# Patient Record
Sex: FEMALE | Race: ASIAN | NOT HISPANIC OR LATINO
[De-identification: names, ages, dates, MRNs, and addresses within clinical notes are randomized per-mention and may not be internally consistent; named-entity substitution may affect disease eponyms.]

---

## 2017-05-22 ENCOUNTER — RESULT REVIEW (OUTPATIENT)
Age: 37
End: 2017-05-22

## 2018-09-27 ENCOUNTER — EMERGENCY (EMERGENCY)
Facility: HOSPITAL | Age: 38
LOS: 1 days | Discharge: HOME | End: 2018-09-27
Attending: EMERGENCY MEDICINE | Admitting: EMERGENCY MEDICINE
Payer: MEDICAID

## 2018-09-27 VITALS
SYSTOLIC BLOOD PRESSURE: 145 MMHG | RESPIRATION RATE: 18 BRPM | HEART RATE: 68 BPM | DIASTOLIC BLOOD PRESSURE: 62 MMHG | TEMPERATURE: 98 F | OXYGEN SATURATION: 98 %

## 2018-09-27 VITALS
HEART RATE: 83 BPM | RESPIRATION RATE: 15 BRPM | TEMPERATURE: 98 F | SYSTOLIC BLOOD PRESSURE: 119 MMHG | DIASTOLIC BLOOD PRESSURE: 82 MMHG | OXYGEN SATURATION: 100 %

## 2018-09-27 LAB
APPEARANCE UR: CLEAR — SIGNIFICANT CHANGE UP
BASOPHILS # BLD AUTO: 0.04 K/UL — SIGNIFICANT CHANGE UP (ref 0–0.2)
BASOPHILS NFR BLD AUTO: 0.4 % — SIGNIFICANT CHANGE UP (ref 0–2)
BILIRUB UR-MCNC: NEGATIVE — SIGNIFICANT CHANGE UP
BLOOD UR QL VISUAL: NEGATIVE — SIGNIFICANT CHANGE UP
BUN SERPL-MCNC: 7 MG/DL — SIGNIFICANT CHANGE UP (ref 7–23)
CALCIUM SERPL-MCNC: 9.2 MG/DL — SIGNIFICANT CHANGE UP (ref 8.4–10.5)
CHLORIDE SERPL-SCNC: 102 MMOL/L — SIGNIFICANT CHANGE UP (ref 98–107)
CO2 SERPL-SCNC: 21 MMOL/L — LOW (ref 22–31)
COLOR SPEC: COLORLESS — SIGNIFICANT CHANGE UP
CREAT SERPL-MCNC: 0.67 MG/DL — SIGNIFICANT CHANGE UP (ref 0.5–1.3)
EOSINOPHIL # BLD AUTO: 0.67 K/UL — HIGH (ref 0–0.5)
EOSINOPHIL NFR BLD AUTO: 6.5 % — HIGH (ref 0–6)
GLUCOSE SERPL-MCNC: 111 MG/DL — HIGH (ref 70–99)
GLUCOSE UR-MCNC: NEGATIVE — SIGNIFICANT CHANGE UP
HCG UR-SCNC: NEGATIVE — SIGNIFICANT CHANGE UP
HCT VFR BLD CALC: 36.8 % — SIGNIFICANT CHANGE UP (ref 34.5–45)
HGB BLD-MCNC: 12.2 G/DL — SIGNIFICANT CHANGE UP (ref 11.5–15.5)
IMM GRANULOCYTES # BLD AUTO: 0.02 # — SIGNIFICANT CHANGE UP
IMM GRANULOCYTES NFR BLD AUTO: 0.2 % — SIGNIFICANT CHANGE UP (ref 0–1.5)
KETONES UR-MCNC: NEGATIVE — SIGNIFICANT CHANGE UP
LEUKOCYTE ESTERASE UR-ACNC: NEGATIVE — SIGNIFICANT CHANGE UP
LYMPHOCYTES # BLD AUTO: 2.2 K/UL — SIGNIFICANT CHANGE UP (ref 1–3.3)
LYMPHOCYTES # BLD AUTO: 21.5 % — SIGNIFICANT CHANGE UP (ref 13–44)
MCHC RBC-ENTMCNC: 27.1 PG — SIGNIFICANT CHANGE UP (ref 27–34)
MCHC RBC-ENTMCNC: 33.2 % — SIGNIFICANT CHANGE UP (ref 32–36)
MCV RBC AUTO: 81.6 FL — SIGNIFICANT CHANGE UP (ref 80–100)
MONOCYTES # BLD AUTO: 0.56 K/UL — SIGNIFICANT CHANGE UP (ref 0–0.9)
MONOCYTES NFR BLD AUTO: 5.5 % — SIGNIFICANT CHANGE UP (ref 2–14)
NEUTROPHILS # BLD AUTO: 6.76 K/UL — SIGNIFICANT CHANGE UP (ref 1.8–7.4)
NEUTROPHILS NFR BLD AUTO: 65.9 % — SIGNIFICANT CHANGE UP (ref 43–77)
NITRITE UR-MCNC: NEGATIVE — SIGNIFICANT CHANGE UP
NRBC # FLD: 0 — SIGNIFICANT CHANGE UP
PH UR: 6.5 — SIGNIFICANT CHANGE UP (ref 5–8)
PLATELET # BLD AUTO: 286 K/UL — SIGNIFICANT CHANGE UP (ref 150–400)
PMV BLD: 10.5 FL — SIGNIFICANT CHANGE UP (ref 7–13)
POTASSIUM SERPL-MCNC: 4.2 MMOL/L — SIGNIFICANT CHANGE UP (ref 3.5–5.3)
POTASSIUM SERPL-SCNC: 4.2 MMOL/L — SIGNIFICANT CHANGE UP (ref 3.5–5.3)
PROT UR-MCNC: NEGATIVE — SIGNIFICANT CHANGE UP
RBC # BLD: 4.51 M/UL — SIGNIFICANT CHANGE UP (ref 3.8–5.2)
RBC # FLD: 13.4 % — SIGNIFICANT CHANGE UP (ref 10.3–14.5)
SODIUM SERPL-SCNC: 138 MMOL/L — SIGNIFICANT CHANGE UP (ref 135–145)
SP GR SPEC: 1.01 — SIGNIFICANT CHANGE UP (ref 1–1.04)
UROBILINOGEN FLD QL: NORMAL — SIGNIFICANT CHANGE UP
WBC # BLD: 10.25 K/UL — SIGNIFICANT CHANGE UP (ref 3.8–10.5)
WBC # FLD AUTO: 10.25 K/UL — SIGNIFICANT CHANGE UP (ref 3.8–10.5)

## 2018-09-27 RX ORDER — DIAZEPAM 5 MG
5 TABLET ORAL ONCE
Qty: 0 | Refills: 0 | Status: COMPLETED | OUTPATIENT
Start: 2018-09-27 | End: 2018-09-27

## 2018-09-27 RX ORDER — KETOROLAC TROMETHAMINE 30 MG/ML
30 SYRINGE (ML) INJECTION ONCE
Qty: 0 | Refills: 0 | Status: DISCONTINUED | OUTPATIENT
Start: 2018-09-27 | End: 2018-09-27

## 2018-09-27 RX ORDER — ACETAMINOPHEN 500 MG
975 TABLET ORAL ONCE
Qty: 0 | Refills: 0 | Status: COMPLETED | OUTPATIENT
Start: 2018-09-27 | End: 2018-09-27

## 2018-09-27 RX ADMIN — Medication 30 MILLIGRAM(S): at 09:20

## 2018-09-27 RX ADMIN — Medication 975 MILLIGRAM(S): at 06:58

## 2018-09-27 RX ADMIN — Medication 975 MILLIGRAM(S): at 07:50

## 2018-09-27 NOTE — ED PROVIDER NOTE - OBJECTIVE STATEMENT
38F hx lupus p/w non-focal mid-to-lower back pain for 1 day. Describes pain throughout the bilateral paraspinal mid and lower back, no midline pain. Worse when lying down and w/ movement. Associated w/ R shoulder pain, R foot pain, and bilateral lower leg and foot numbness, all of which is chronic for months-years and unchanged from baseline. No new weakness/numbness/fever/bowel or bladder incontinence/dysuria/frequency/hematuria. Does describe "bubbling" in her urine last week.

## 2018-09-27 NOTE — ED PROVIDER NOTE - NSFOLLOWUPINSTRUCTIONS_ED_ALL_ED_FT
Please follow up with your primary care doctor  Motrin and/or tylenol for pain per dosing on bottle  Return to ED if you have any new or concerning symptoms

## 2018-09-27 NOTE — ED ADULT TRIAGE NOTE - CHIEF COMPLAINT QUOTE
Pt. with history of lupus if c/o back pain since last night, has been applying medicated creams with no relief. Pt. awake and alert, able to ambulate. Pt. with history of lupus if c/o back pain since last night, has been applying medicated creams with no relief. Pt. awake and alert, able to ambulate.    Addendum: Pt received from [facility]'s ED at this time. Pt states has chronic R sided joint point, states yesterday developed new lower back pain. Pt denies urinary symptoms or trauma.

## 2018-09-27 NOTE — ED PROVIDER NOTE - PROGRESS NOTE DETAILS
Received sign out from day team. Labs all wnl. Patient still endorsing significant pain, unchanged from prior. Will administer valium, toradol, re-eval.  Greg Brandon MD, PGY2 Emergency Medicine Received sign out from day team. Labs all wnl. Patient still endorsing significant pain, unchanged from prior. Will administer toradol, re-eval.  Greg Brandon MD, PGY2 Emergency Medicine Patient states she would like to go, as she has a daughter in the Peds ED. Will d/c home with strict return precautions, PCP f/u  Spoke with patient extensively regarding current differential diagnosis for ongoing symptoms, and patient acknowledged understanding. All questions and concerns have been addressed with the patient. I have discussed the plan for care and patient is in agreement. Patient is instructed to follow up with Primary Care Provider, and has been given strict return precautions.   Greg Brandon MD, PGY2 Emergency Medicine

## 2018-09-27 NOTE — ED PROVIDER NOTE - MEDICAL DECISION MAKING DETAILS
Jonathan Weil, PGY2 - 38F w/ paraspinal back pain, chronic RUE and RLE pain and BLE paresthesias, no new neuro deficits or red flags such as fever or recent steroid use. Will check UA/u-preg/renal function, provide analgesia.

## 2018-09-27 NOTE — ED PROVIDER NOTE - ATTENDING CONTRIBUTION TO CARE
DR. YUN, ATTENDING MD-  I performed a face to face bedside interview with patient regarding history of present illness, review of symptoms and past medical history. I completed an independent physical exam.  I have discussed patient's plan of care with the resident.   Documentation as above in the note.   HPI: 38F hx lupus p/w non-focal mid-to-lower back pain for 1 day. Describes pain throughout the bilateral paraspinal mid and lower back, no midline pain. Worse when lying down and w/ movement. Associated w/ R shoulder pain, R foot pain, and bilateral lower leg and foot numbness, all of which is chronic for months-years and unchanged from baseline. No new weakness/numbness/fever/bowel or bladder incontinence/dysuria/frequency/hematuria. Does describe "bubbling" in her urine last week.  EXAM: tenderness to palpation bilateral paraspinal thoracic pain. NAD, no C/T/L spine tenderness to palpation or stepoffs, abd soft nontender, lungs ctab, heart RRR.   MDM: concern for kidney dysfxn vs pyelo vs UTI vs MSK pain. benign exam. Will obtain labs,. provide pain meds and reassess.

## 2018-09-27 NOTE — ED ADULT NURSE NOTE - OBJECTIVE STATEMENT
Received pt to room 16 AOX3 in no apparent distress c/o lower back and coccyx pain x1 day. Pt also c/o increase in urinary frequency and urine appearing frothy. Pt denies any numbness, pain radiation, painful/burning urination, changes in bowl movement, N/V, chest pain, or any other symptoms at this time. Breaths are even unlabored and clear bilaterally, no obvious deformities to spine pt denies any trauma, hx include lupus pt states this is not typical flare up. IV placed, labs sent, will continue to monitor

## 2018-09-27 NOTE — ED ADULT NURSE NOTE - NSIMPLEMENTINTERV_GEN_ALL_ED
Implemented All Universal Safety Interventions:  [place] to call system. Call bell, personal items and telephone within reach. Instruct patient to call for assistance. Room bathroom lighting operational. Non-slip footwear when patient is off stretcher. Physically safe environment: no spills, clutter or unnecessary equipment. Stretcher in lowest position, wheels locked, appropriate side rails in place.

## 2018-09-27 NOTE — ED PROVIDER NOTE - NEUROLOGICAL, MLM
Alert and oriented, no focal deficits, no motor deficits, decreased sensation to light touch in BLE from feet to lower legs (chronic as described by pt)

## 2018-09-27 NOTE — ED ADULT NURSE NOTE - CHIEF COMPLAINT QUOTE
Pt. with history of lupus if c/o back pain since last night, has been applying medicated creams with no relief. Pt. awake and alert, able to ambulate.    Addendum: Pt received from [facility]'s ED at this time. Pt states has chronic R sided joint point, states yesterday developed new lower back pain. Pt denies urinary symptoms or trauma.

## 2018-10-05 ENCOUNTER — INPATIENT (INPATIENT)
Facility: HOSPITAL | Age: 38
LOS: 3 days | Discharge: HOME | DRG: 684 | End: 2018-10-09
Attending: HOSPITALIST | Admitting: HOSPITALIST
Payer: MEDICAID

## 2018-10-05 VITALS
RESPIRATION RATE: 18 BRPM | TEMPERATURE: 99 F | HEIGHT: 63 IN | SYSTOLIC BLOOD PRESSURE: 141 MMHG | HEART RATE: 87 BPM | DIASTOLIC BLOOD PRESSURE: 82 MMHG | WEIGHT: 130.07 LBS | OXYGEN SATURATION: 100 %

## 2018-10-05 DIAGNOSIS — N17.9 ACUTE KIDNEY FAILURE, UNSPECIFIED: ICD-10-CM

## 2018-10-05 PROBLEM — M32.9 SYSTEMIC LUPUS ERYTHEMATOSUS, UNSPECIFIED: Chronic | Status: ACTIVE | Noted: 2018-09-27

## 2018-10-05 LAB
ALBUMIN SERPL ELPH-MCNC: 3.8 G/DL — SIGNIFICANT CHANGE UP (ref 3.3–5)
ALP SERPL-CCNC: 81 U/L — SIGNIFICANT CHANGE UP (ref 40–120)
ALT FLD-CCNC: 43 U/L — SIGNIFICANT CHANGE UP (ref 10–45)
ANION GAP SERPL CALC-SCNC: 10 MMOL/L — SIGNIFICANT CHANGE UP (ref 5–17)
APTT BLD: 28.1 SEC — SIGNIFICANT CHANGE UP (ref 27.5–37.4)
AST SERPL-CCNC: 16 U/L — SIGNIFICANT CHANGE UP (ref 10–40)
BASOPHILS # BLD AUTO: 0 K/UL — SIGNIFICANT CHANGE UP (ref 0–0.2)
BASOPHILS NFR BLD AUTO: 0.5 % — SIGNIFICANT CHANGE UP (ref 0–2)
BILIRUB SERPL-MCNC: 0.1 MG/DL — LOW (ref 0.2–1.2)
BUN SERPL-MCNC: 13 MG/DL — SIGNIFICANT CHANGE UP (ref 7–23)
CALCIUM SERPL-MCNC: 8.9 MG/DL — SIGNIFICANT CHANGE UP (ref 8.4–10.5)
CHLORIDE SERPL-SCNC: 105 MMOL/L — SIGNIFICANT CHANGE UP (ref 96–108)
CO2 SERPL-SCNC: 24 MMOL/L — SIGNIFICANT CHANGE UP (ref 22–31)
CREAT SERPL-MCNC: 1.38 MG/DL — HIGH (ref 0.5–1.3)
EOSINOPHIL # BLD AUTO: 0.4 K/UL — SIGNIFICANT CHANGE UP (ref 0–0.5)
EOSINOPHIL NFR BLD AUTO: 4.9 % — SIGNIFICANT CHANGE UP (ref 0–6)
GAS PNL BLDV: SIGNIFICANT CHANGE UP
GLUCOSE SERPL-MCNC: 96 MG/DL — SIGNIFICANT CHANGE UP (ref 70–99)
HCG SERPL-ACNC: <2 MIU/ML — SIGNIFICANT CHANGE UP
HCT VFR BLD CALC: 30.6 % — LOW (ref 34.5–45)
HGB BLD-MCNC: 10.1 G/DL — LOW (ref 11.5–15.5)
INR BLD: 1.05 RATIO — SIGNIFICANT CHANGE UP (ref 0.88–1.16)
LYMPHOCYTES # BLD AUTO: 1.9 K/UL — SIGNIFICANT CHANGE UP (ref 1–3.3)
LYMPHOCYTES # BLD AUTO: 24.2 % — SIGNIFICANT CHANGE UP (ref 13–44)
MCHC RBC-ENTMCNC: 26.9 PG — LOW (ref 27–34)
MCHC RBC-ENTMCNC: 32.9 GM/DL — SIGNIFICANT CHANGE UP (ref 32–36)
MCV RBC AUTO: 81.8 FL — SIGNIFICANT CHANGE UP (ref 80–100)
MONOCYTES # BLD AUTO: 0.5 K/UL — SIGNIFICANT CHANGE UP (ref 0–0.9)
MONOCYTES NFR BLD AUTO: 6.7 % — SIGNIFICANT CHANGE UP (ref 2–14)
NEUTROPHILS # BLD AUTO: 5.1 K/UL — SIGNIFICANT CHANGE UP (ref 1.8–7.4)
NEUTROPHILS NFR BLD AUTO: 63.8 % — SIGNIFICANT CHANGE UP (ref 43–77)
PLATELET # BLD AUTO: 270 K/UL — SIGNIFICANT CHANGE UP (ref 150–400)
POTASSIUM SERPL-MCNC: 4 MMOL/L — SIGNIFICANT CHANGE UP (ref 3.5–5.3)
POTASSIUM SERPL-SCNC: 4 MMOL/L — SIGNIFICANT CHANGE UP (ref 3.5–5.3)
PROT SERPL-MCNC: 7.8 G/DL — SIGNIFICANT CHANGE UP (ref 6–8.3)
PROTHROM AB SERPL-ACNC: 11.3 SEC — SIGNIFICANT CHANGE UP (ref 9.8–12.7)
RBC # BLD: 3.74 M/UL — LOW (ref 3.8–5.2)
RBC # FLD: 12.9 % — SIGNIFICANT CHANGE UP (ref 10.3–14.5)
SODIUM SERPL-SCNC: 139 MMOL/L — SIGNIFICANT CHANGE UP (ref 135–145)
WBC # BLD: 8 K/UL — SIGNIFICANT CHANGE UP (ref 3.8–10.5)
WBC # FLD AUTO: 8 K/UL — SIGNIFICANT CHANGE UP (ref 3.8–10.5)

## 2018-10-05 PROCEDURE — 99285 EMERGENCY DEPT VISIT HI MDM: CPT | Mod: 25

## 2018-10-05 PROCEDURE — 93010 ELECTROCARDIOGRAM REPORT: CPT

## 2018-10-05 PROCEDURE — 71275 CT ANGIOGRAPHY CHEST: CPT | Mod: 26

## 2018-10-05 PROCEDURE — 99223 1ST HOSP IP/OBS HIGH 75: CPT | Mod: GC

## 2018-10-05 RX ORDER — LIDOCAINE 4 G/100G
1 CREAM TOPICAL ONCE
Qty: 0 | Refills: 0 | Status: COMPLETED | OUTPATIENT
Start: 2018-10-05 | End: 2018-10-05

## 2018-10-05 RX ORDER — ACETAMINOPHEN 500 MG
650 TABLET ORAL ONCE
Qty: 0 | Refills: 0 | Status: COMPLETED | OUTPATIENT
Start: 2018-10-05 | End: 2018-10-05

## 2018-10-05 RX ORDER — SODIUM CHLORIDE 9 MG/ML
1000 INJECTION INTRAMUSCULAR; INTRAVENOUS; SUBCUTANEOUS
Qty: 0 | Refills: 0 | Status: DISCONTINUED | OUTPATIENT
Start: 2018-10-05 | End: 2018-10-09

## 2018-10-05 RX ORDER — LEVOTHYROXINE SODIUM 125 MCG
50 TABLET ORAL DAILY
Qty: 0 | Refills: 0 | Status: DISCONTINUED | OUTPATIENT
Start: 2018-10-05 | End: 2018-10-09

## 2018-10-05 RX ORDER — HYDROXYCHLOROQUINE SULFATE 200 MG
200 TABLET ORAL
Qty: 0 | Refills: 0 | Status: DISCONTINUED | OUTPATIENT
Start: 2018-10-06 | End: 2018-10-09

## 2018-10-05 RX ORDER — HYDROXYCHLOROQUINE SULFATE 200 MG
400 TABLET ORAL
Qty: 0 | Refills: 0 | Status: DISCONTINUED | OUTPATIENT
Start: 2018-10-07 | End: 2018-10-09

## 2018-10-05 RX ORDER — INFLUENZA VIRUS VACCINE 15; 15; 15; 15 UG/.5ML; UG/.5ML; UG/.5ML; UG/.5ML
0.5 SUSPENSION INTRAMUSCULAR ONCE
Qty: 0 | Refills: 0 | Status: COMPLETED | OUTPATIENT
Start: 2018-10-05 | End: 2018-10-09

## 2018-10-05 RX ORDER — ACETAMINOPHEN 500 MG
650 TABLET ORAL EVERY 6 HOURS
Qty: 0 | Refills: 0 | Status: DISCONTINUED | OUTPATIENT
Start: 2018-10-05 | End: 2018-10-07

## 2018-10-05 RX ADMIN — Medication 650 MILLIGRAM(S): at 17:23

## 2018-10-05 RX ADMIN — LIDOCAINE 1 PATCH: 4 CREAM TOPICAL at 17:24

## 2018-10-05 RX ADMIN — Medication 650 MILLIGRAM(S): at 18:11

## 2018-10-05 NOTE — H&P ADULT - PROBLEM SELECTOR PLAN 4
- Hold antihypertensive (lisinopril) -hg ~10  - unclear source, likely menstrual etiology  - obtain iron studies - Hb ~10, with recent blood work from 2 weeks ago w Hb 12.2  - no rectal bleeding, no hematemesis; likely menstrual etiology  - obtain iron studies

## 2018-10-05 NOTE — H&P ADULT - PROBLEM SELECTOR PLAN 5
- continue on levothyroxine - Hold antihypertensive (lisinopril) - Hold antihypertensive (lisinopril) 2/2 MARYAM

## 2018-10-05 NOTE — ED PROVIDER NOTE - PROGRESS NOTE DETAILS
Decreased in hemoglobin from prior. Only source of bleeding is current menses. Refused tylenol, now will accept tylenol and lidocaine patch. Pending CTA

## 2018-10-05 NOTE — ED PROVIDER NOTE - ATTENDING CONTRIBUTION TO CARE
37 yo F with hx of lupus on plaquenil, hypothyroidism here for back pain worse with inspiration x 3 weeks. Saw rheumatologist and sent to ED. Reports leg hurt in past. No hx of DVT/PE. No f/c/n/v. CTA to r/o PE vs V/Q scan to r/o PE 39 yo F with hx of lupus on plaquenil, hypothyroidism here for back pain in mid back worse with inspiration x 3 weeks. Saw rheumatologist and sent to ED. Reports leg hurt in past. No hx of DVT/PE. No f/c/n/v. CTA to r/o PE.     VS noted  Gen. no acute distress, Non toxic   HEENT: EOMI, mmm  Lungs: CTAB/L no C/ W /R   CVS: RRR   Abd; Soft non tender, non distended   Spine: No C-T-L spine tenderness  Ext: no edema, no calf tenderness  Skin: no rash  Neuro AAOx3 non focal clear speech  a/p: r/o PE for pain worse with inspiration - will check labs, CTA Chest to r/o PE  - Jayson GIBBS

## 2018-10-05 NOTE — ED PROVIDER NOTE - MEDICAL DECISION MAKING DETAILS
38F lupus with chest and back pain with inspiration and questionable history of leg pain. Concern for PE. CTA, labs, pain control, EKG

## 2018-10-05 NOTE — ED PROVIDER NOTE - OBJECTIVE STATEMENT
37yo female pmh lupus, stage V nephritis, HTN p/w back pain worse with inspiration x 3 weeks, mild improvement with naproxen 500mg BID, sent in by Dr. Olmedo, [facility]. Inspiratory pain is bilateral mid back a/w bilateral lower anterior chest wall. 4d ago vomiting, self-resolved, given course of tamiflu. Denies fevers, diarrhea, falls or trauma, hemoptysis, cough, urinary complaints, abdominal pain, recent travel. +self-resolved bilateral leg pain, worse on LLE. No history OCP use or recent travel or history of DVT/PE.

## 2018-10-05 NOTE — H&P ADULT - PROBLEM SELECTOR PLAN 8
- encourage early ambulation - Patient endorses nausea, no abd pain, no diarrhea, no fever, no sick contacts   -? viral gastroenteritis  - continue on zofran (QTC wnl) PRN  -continue on regular diet, however change if not tolerating  -bowel regime - IMPROVE 0   - encourage ambulation

## 2018-10-05 NOTE — H&P ADULT - ATTENDING COMMENTS
Patient assigned to me by night hospitalist in charge for management and care for patient for this evening only. Care to be resumed by day hospitalist in the morning and thereafter.     Pt seen and examined. Case d/w house staff Dr. Brizuela. Agree with assessment and plan, with changes made where appropriate.

## 2018-10-05 NOTE — H&P ADULT - NSHPSOCIALHISTORY_GEN_ALL_CORE
Patient lives with Patient lives with  and kids, denies alcohol use, cigarette use or drug use, but drinks marijuana tea

## 2018-10-05 NOTE — H&P ADULT - HISTORY OF PRESENT ILLNESS
39yo female PMH lupus, stage V nephritis, HTN p/w back pain worse with inspiration x 3 weeks, mild improvement with naproxen 500mg BID, sent in by Dr. Olmedo, [facility]. Inspiratory pain is bilateral mid back a/w bilateral lower anterior chest wall. 4d ago vomiting, self-resolved, given course of tamiflu. Denies fevers, diarrhea, falls or trauma, hemoptysis, cough, urinary complaints, abdominal pain, recent travel. +self-resolved bilateral leg pain, worse on LLE. No history OCP use or recent travel or history of DVT/PE. 39 yo female PMH lupus, stage V nephritis, HTN coming in with back pain worse with breathing, moving around and lying down. Patient states that this has occurred about 3 weeks, patient reports it is in the setting of muscle pain R>L. States that she was recently placed on naproxen 500mg BID and has taken it for 3 days and pain is now 6/10. Patient was sent in by Dr. Olmedo, [facility]. Patient also states that she was recently given Tamiflu 75mg BID and took 4/5 days (today is 5/5 and has not taken it) as well as a prednisone taper, which she says it's for her back pain? (has only taken 3 days of the taper). Patient also endorses that she vomited last Monday "all night", reported that it was after her  gave her his own oxycodone. Patient denies fevers, chills, abd pain, endorses some nausea. States that she has been taking Miralax as she was constipated, but this has resolved. Denies urinary complaints, melena/hematochezia/hematemesis. Denies any recent sick contacts or travels. Endorses taking a new dietary supplement Garcinia Ambogia states that she took it a month ago, and reports seeing bubbles in her urine (reports  sees bubbles too in his urine).     REVIEW OF SYSTEMS:  CONSTITUTIONAL: No weakness, fevers or chills  EYES/ENT: No visual changes;  No throat pain   NECK: No pain or stiffness  RESPIRATORY: No cough, wheezing, hemoptysis; No shortness of breath  CARDIOVASCULAR: No chest pain or palpitations  GASTROINTESTINAL: No abdominal or epigastric pain.  No diarrhea or constipation. No melena or hematochezia. + nausea   GENITOURINARY: No dysuria, frequency or hematuria  NEUROLOGICAL: No numbness or weakness  MSK: endorses back pain  SKIN: No itching, burning, rashes, or lesions   All other review of systems is negative unless indicated above. 39 yo female PMH lupus, stage V nephritis, HTN coming in with back pain worse with breathing, moving around and lying down. Patient states that this has occurred about 3 weeks, patient reports it is in the setting of muscle pain L>R Pain is achy, bilateral,  lower back, crossing midline wraps around the L side toward abd, but not on abd. No new activities, or picking up heavy objects.  States that she was recently placed on naproxen 500mg BID and has taken it for 3 days and pain is now 6/10. Patient was sent in by Dr. Olmedo, [facility]. Patient also states that she was recently given Tamiflu 75mg BID and took 4/5 days (today is 5/5 and has not taken it) as well as a prednisone taper, which she says it's for her back pain? (has only taken 3 days of the taper). Patient also endorses that she vomited last Monday "all night", reported that it was after her  gave her his own oxycodone. Patient denies fevers, chills, abd pain, endorses some nausea. States that she has been taking Miralax as she was constipated, but this has resolved. Denies urinary complaints, melena/hematochezia/hematemesis. Denies any recent sick contacts or travels. Endorses taking a new dietary supplement Garcinia Ambogia states that she took it a month ago, and reports seeing bubbles in her urine (reports  sees bubbles too in his urine).     REVIEW OF SYSTEMS:  CONSTITUTIONAL: No weakness, fevers or chills  EYES/ENT: No visual changes;  No throat pain   NECK: No pain or stiffness  RESPIRATORY: No cough, wheezing, hemoptysis; No shortness of breath  CARDIOVASCULAR: No chest pain or palpitations  GASTROINTESTINAL: No abdominal or epigastric pain.  No diarrhea or constipation. No melena or hematochezia. + nausea   GENITOURINARY: No dysuria, frequency or hematuria  NEUROLOGICAL: No numbness or weakness  MSK: endorses back pain  SKIN: No burning, rashes, or lesions, states hands are itchy  All other review of systems is negative unless indicated above. 39 yo female PMH lupus, stage V nephritis, HTN coming in with back pain worse with breathing, moving around and lying down. Patient states that this has occurred about 3 weeks, patient reports it is in the setting of muscle pain L>R. Pain is achy, bilateral,  lower back, crossing midline wraps around the L side toward abd, but not on abd. No new activities, or picking up heavy objects.  States that she was recently placed on naproxen 500mg BID and has taken it for 3 days and pain is now 6/10. Patient was sent in by Dr. Olmedo, [facility]. Patient also states that she was recently given Tamiflu 75mg BID and took 4/5 days (today is 5/5 and has not taken it) as well as a prednisone taper, which she says is for her back pain (has only taken 3 days of the taper). Patient also endorses that she vomited last Monday "all night", reported that it was after her  gave her his own oxycodone. Patient denies fevers, chills, abd pain, endorses some nausea. States that she has been taking Miralax as she was constipated, but this has resolved. Denies urinary complaints, melena/hematochezia/hematemesis. Denies any recent sick contacts or travels. Endorses taking a new dietary supplement Garcinia Ambogia states that she took it a month ago, and reports seeing bubbles in her urine (reports  sees bubbles too in his urine). 39 yo female PMH lupus, stage V nephritis, HTN coming in with back pain worse with breathing, moving around and lying down. Patient states that this has occurred about 3 weeks, achy in nature, L mid back wrapping around to lateral chest/abd wall, 10/10. No new activities, or picking up heavy objects. Pain is significantly worse with any movement and laying down flat; feels better when sitting up in a chair. Went to see her PMD on Monday, who thought her symptoms may be early flus and started her on Tamiflu and naproxen 500mg BID; has been taking it for past 3d and states pain is improved to 5-6/10.   On day of admission, pt seen by Dr. Olmedo, [facility] and sent in to ED for further w/u. Patient also endorses that she vomited last Monday "all night", reported that it was after her  gave her his own oxycodone. Patient denies fevers, chills, abd pain, endorses some nausea. States that she has been taking Miralax as she was constipated, but this has resolved. Denies urinary complaints, melena/hematochezia/hematemesis. Denies any recent sick contacts or travels. Endorses taking a new dietary supplement Garcinia Ambogia states that she took it a month ago, and reports seeing bubbles in her urine (reports  sees bubbles too in his urine).

## 2018-10-05 NOTE — H&P ADULT - PROBLEM SELECTOR PLAN 7
- Patient endorses nausea, no abd pain, no diarrhea, no fever, no sick contacts   -? viral gastroenteritis  - continue on zofran (QTC wnl) PRN  -continue on regular diet, however change if not tolerating - obtain collateral about why pt was on prednisone and Tamiflu per rheumatologist Dr. Rodri Olmedo. - Patient endorses nausea, no abd pain, no diarrhea, no fever, no sick contacts   -? viral gastroenteritis  - continue on zofran (QTC wnl) PRN  -continue on regular diet, however change if not tolerating  -bowel regimen

## 2018-10-05 NOTE — ED ADULT NURSE NOTE - OBJECTIVE STATEMENT
37 y/o female patient presents ambulatory to ED with  at the bedside sent from rheumatologist for 3 weeks of mid back pain which worsens with laying flat and is associated with epigastric pain and intermittent SOB. PMHX hypothyroidism on levothyroxine and SLE on Plaquenil. Patient seen at outside hospital 8 days ago, "negative workup" - then followed up with PMD 5 days ago, given tamiflu. Patient states persistent symptoms so rheumatologist "request a ct scan". Patient currently denies CP, N/V/D, afebrile in ED.

## 2018-10-05 NOTE — H&P ADULT - NSHPLABSRESULTS_GEN_ALL_CORE
10.1   8.0   )-----------( 270      ( 05 Oct 2018 14:58 )             30.6       10-05    139  |  105  |  13  ----------------------------<  96  4.0   |  24  |  1.38<H>    Ca    8.9      05 Oct 2018 14:58    TPro  7.8  /  Alb  3.8  /  TBili  0.1<L>  /  DBili  x   /  AST  16  /  ALT  43  /  AlkPhos  81  10-05                  PT/INR - ( 05 Oct 2018 14:58 )   PT: 11.3 sec;   INR: 1.05 ratio         PTT - ( 05 Oct 2018 14:58 )  PTT:28.1 sec    Lactate Trend            CAPILLARY BLOOD GLUCOSE    EKG: normal sinus rhythm, HR 60's no ST changes Labs, imaging and EKG personally reviewed and interpreted by me - WBC 8.0, Hb 10.0 (drop from 12.2 last week), Cr 1.38 (baseline ~0.7), lactate 1.0. CT angio (-) for PE, no PNA/consolidations. EKG: normal sinus rhythm, HR 60's no ST changes               10.1   8.0   )-----------( 270      ( 05 Oct 2018 14:58 )             30.6       10-05    139  |  105  |  13  ----------------------------<  96  4.0   |  24  |  1.38<H>    Ca    8.9      05 Oct 2018 14:58    TPro  7.8  /  Alb  3.8  /  TBili  0.1<L>  /  DBili  x   /  AST  16  /  ALT  43  /  AlkPhos  81  10-05        PT/INR - ( 05 Oct 2018 14:58 )   PT: 11.3 sec;   INR: 1.05 ratio    PTT - ( 05 Oct 2018 14:58 )  PTT:28.1 sec    < from: CT Angio Chest w/ IV Cont (10.05.18 @ 17:01) >  LUNGS AND LARGE AIRWAYS: Patent central airways. Evaluation of the lung   bases is limited secondary to respiratory motion artifact. No   consolidations or pulmonary nodules.  PLEURA: No pleural effusion.  VESSELS: No pulmonary embolism. Limited evaluation of some of the   subsegmental pulmonary arteries due to respiratory motion artifact and   poor contrast opacification.  HEART: Heart size is normal. No pericardial effusion.  MEDIASTINUM AND ELMER: No lymphadenopathy.  CHEST WALL AND LOWER NECK: Within normal limits.  VISUALIZED UPPER ABDOMEN: Unremarkable.  BONES: Mild degenerative changes.    IMPRESSION:   No pulmonary embolism or pneumonia. Limited evaluation of some   subsegmental pulmonary arteries.

## 2018-10-05 NOTE — H&P ADULT - ASSESSMENT
37 yo female PMH lupus, stage V nephritis, HTN coming in with back pain worse with breathing, moving around and lying down now found with MARYAM, likely in the setting of new medication 37 yo female PMH lupus, stage V nephritis, HTN coming in with subacute L back pain - a/w likely MSK sprain and MARYAM, possibly 2/2 medication effects.

## 2018-10-05 NOTE — H&P ADULT - NSHPREVIEWOFSYSTEMS_GEN_ALL_CORE
REVIEW OF SYSTEMS:  CONSTITUTIONAL: No weakness, fevers or chills  EYES/ENT: No visual changes;  No throat pain   NECK: No pain or stiffness  RESPIRATORY: No cough, wheezing, hemoptysis; No shortness of breath  CARDIOVASCULAR: No chest pain or palpitations  GASTROINTESTINAL: No abdominal or epigastric pain.  No diarrhea or constipation. No melena or hematochezia. + nausea   GENITOURINARY: No dysuria, frequency or hematuria  NEUROLOGICAL: No numbness or weakness  MSK: endorses back pain, no joint pain   SKIN: No burning, rashes, or lesions, states hands are itchy  All other review of systems is negative unless indicated above.

## 2018-10-05 NOTE — H&P ADULT - PROBLEM SELECTOR PLAN 1
- likely in the setting of taking new nephrotoxic meds (intrinsic); baseline Scr ~0.5-0.6  - patient w/ hx of lupus nephritis   -will obtain renal u/s  -FeNA and urine osms   - u/a  -continue on IVF, renal diet  - consider nephrology consult if no improvement  - trend scr, I/Os  -avoid nephrotoxins - likely in the setting of taking new nephrotoxic meds (intrinsic); baseline Scr ~0.5-0.6  - patient w/ hx of lupus nephritis   -will obtain renal u/s  -FeNA and urine osms   - u/a  -continue on IVF, renal diet  - consider nephrology consult if no improvement  - trend scr, I/Os  -avoid nephrotoxins, such as NSAIDs - likely in the setting of taking new nephrotoxic meds (NSAIDs) in setting of lupus nephritis, but ddx also includes worsening lupus nephritis vs obstructive vs; baseline Scr ~0.5-0.6   - will obtain renal u/s  -chek FeNA and urine osms   - UA without blood or protein, making GN less likely; would spin UA to look for casts   - continue on IVF, renal diet  - consider nephrology consult if no improvement  - trend scr, I/Os  - avoid nephrotoxins, such as NSAIDs; renally dose medications - likely 2/2 AIN in setting of new nephrotoxic meds (NSAIDs) in setting of lupus nephritis, but ddx also includes worsening lupus nephritis vs obstructive; baseline Scr ~0.5-0.6   - will obtain renal u/s  -chek FeNA and urine osms   - UA without blood or protein, making GN less likely; would spin UA to look for casts   - continue on IVF, renal diet  - consider nephrology consult if no improvement  - trend scr, I/Os  - avoid nephrotoxins, such as NSAIDs; renally dose medications

## 2018-10-05 NOTE — H&P ADULT - NSHPPHYSICALEXAM_GEN_ALL_CORE
Vital Signs Last 24 Hrs  T(C): 36.8 (05 Oct 2018 23:45), Max: 37.1 (05 Oct 2018 13:45)  T(F): 98.2 (05 Oct 2018 23:45), Max: 98.8 (05 Oct 2018 17:50)  HR: 70 (05 Oct 2018 23:45) (64 - 87)  BP: 137/81 (05 Oct 2018 23:45) (130/70 - 153/89)  BP(mean): --  RR: 16 (05 Oct 2018 23:45) (16 - 18)  SpO2: 98% (05 Oct 2018 23:45) (98% - 100%)    PHYSICAL EXAM:  GENERAL: NAD  HEAD:  Atraumatic, Normocephalic  EYES: EOMI, PERRLA, conjunctiva and sclera clear  ENMT:  Moist mucous membranes  NECK: Supple, Normal thyroid  HEART: Regular rate and rhythm; No murmurs, rubs, or gallops  RESPIRATORY: CTA B/L, No W/R/R  ABDOMEN: Soft, Bowel sounds present,   MSK: Muscular paraspinal tenderness on palpation on L>R   NEUROLOGY: A&Ox3, nonfocal, moving all extremities  EXTREMITIES:  2+ Peripheral Pulses, No clubbing, cyanosis, or edema  SKIN: warm, dry, normal color, no rash or abnormal lesions (dry skin on hands) Vital Signs Last 24 Hrs  T(C): 36.8 (05 Oct 2018 23:45), Max: 37.1 (05 Oct 2018 13:45)  T(F): 98.2 (05 Oct 2018 23:45), Max: 98.8 (05 Oct 2018 17:50)  HR: 70 (05 Oct 2018 23:45) (64 - 87)  BP: 137/81 (05 Oct 2018 23:45) (130/70 - 153/89)  BP(mean): --  RR: 16 (05 Oct 2018 23:45) (16 - 18)  SpO2: 98% (05 Oct 2018 23:45) (98% - 100%)    PHYSICAL EXAM:  GENERAL: NAD  HEAD:  Atraumatic, Normocephalic  EYES: EOMI, sclera clear  ENMT:  Moist mucous membranes  NECK: Supple, no JVD  HEART: Regular rate and rhythm; No murmurs, rubs, or gallops  RESPIRATORY: CTA B/L, No W/R/R  ABDOMEN: Soft, Bowel sounds present, non tender   MSK: mid-thoracic tenderness in L paraspinal muscles, no midline or R sided tenderness   NEUROLOGY: A&Ox3, nonfocal, moving all extremities  EXTREMITIES: No clubbing, cyanosis, or edema  SKIN: warm, dry, normal color, no rash or abnormal lesions (dry skin on hands)

## 2018-10-05 NOTE — H&P ADULT - PROBLEM SELECTOR PLAN 6
- obtain collateral about why pt was on prednisone per rheumatologist Dr. Rodri Olmedo. - continue on levothyroxine

## 2018-10-05 NOTE — ED ADULT NURSE NOTE - NSIMPLEMENTINTERV_GEN_ALL_ED
Implemented All Universal Safety Interventions:  [place] to call system. Call bell, personal items and telephone within reach. Instruct patient to call for assistance. Room bathroom lighting operational. Non-slip footwear when patient is off stretcher. Physically safe environment: no spills, clutter or unnecessary equipment. Stretcher in lowest position, wheels locked, appropriate side rails in place.

## 2018-10-05 NOTE — H&P ADULT - PROBLEM SELECTOR PLAN 2
-reports back pain (on exam reports back muscle tender to palpation)  - likely in setting of musculoskeletal pain  - intial suspicion for PE given reported hx of inspiratory back pain, however CTA negative   -consider lumbar and thoracic spine xray   pain control with tyelnol -reports back pain (on exam reports back muscle tender to palpation)  - likely in setting of musculoskeletal pain  - intial suspicion for PE given reported hx of inspiratory back pain, however CTA negative   - lumbar and thoracic spine xray   pain control with tyelnol  -if no improvement consider steroid taper - endorsing back pain (on exam reports back muscle tender to palpation, worse with movement) - suspect likely in setting of musculoskeletal pain  - intial suspicion for PE given reported hx of inspiratory back pain, however CTA negative   - lumbar and thoracic spine xray, although low suspicion for bony pathology    - pain control with tylenol, c/w lidocaine patch   - if no improvement consider steroid taper  - avoid NSAIDs in setting of MARYAM - endorsing back pain (on exam reports back muscle tender to palpation, worse with movement) - suspect likely in setting of musculoskeletal pain  - initial suspicion for PE given reported hx of inspiratory back pain, however CTA negative   - lumbar and thoracic spine xray, although low suspicion for bony pathology    - pain control with tylenol, c/w lidocaine patch   - if no improvement consider steroid taper if ok with pt's rheumatologist   - avoid NSAIDs in setting of MARYAM

## 2018-10-06 DIAGNOSIS — D64.9 ANEMIA, UNSPECIFIED: ICD-10-CM

## 2018-10-06 DIAGNOSIS — Z79.899 OTHER LONG TERM (CURRENT) DRUG THERAPY: ICD-10-CM

## 2018-10-06 DIAGNOSIS — I10 ESSENTIAL (PRIMARY) HYPERTENSION: ICD-10-CM

## 2018-10-06 DIAGNOSIS — E03.9 HYPOTHYROIDISM, UNSPECIFIED: ICD-10-CM

## 2018-10-06 DIAGNOSIS — N17.9 ACUTE KIDNEY FAILURE, UNSPECIFIED: ICD-10-CM

## 2018-10-06 DIAGNOSIS — M32.9 SYSTEMIC LUPUS ERYTHEMATOSUS, UNSPECIFIED: ICD-10-CM

## 2018-10-06 DIAGNOSIS — M54.9 DORSALGIA, UNSPECIFIED: ICD-10-CM

## 2018-10-06 DIAGNOSIS — R11.0 NAUSEA: ICD-10-CM

## 2018-10-06 DIAGNOSIS — Z29.9 ENCOUNTER FOR PROPHYLACTIC MEASURES, UNSPECIFIED: ICD-10-CM

## 2018-10-06 LAB
ALBUMIN SERPL ELPH-MCNC: 3.8 G/DL — SIGNIFICANT CHANGE UP (ref 3.3–5)
ALP SERPL-CCNC: 79 U/L — SIGNIFICANT CHANGE UP (ref 40–120)
ALT FLD-CCNC: 37 U/L — SIGNIFICANT CHANGE UP (ref 10–45)
ANION GAP SERPL CALC-SCNC: 12 MMOL/L — SIGNIFICANT CHANGE UP (ref 5–17)
APPEARANCE UR: CLEAR — SIGNIFICANT CHANGE UP
AST SERPL-CCNC: 16 U/L — SIGNIFICANT CHANGE UP (ref 10–40)
BACTERIA # UR AUTO: NEGATIVE — SIGNIFICANT CHANGE UP
BASOPHILS # BLD AUTO: 0 K/UL — SIGNIFICANT CHANGE UP (ref 0–0.2)
BASOPHILS NFR BLD AUTO: 0.3 % — SIGNIFICANT CHANGE UP (ref 0–2)
BILIRUB SERPL-MCNC: 0.2 MG/DL — SIGNIFICANT CHANGE UP (ref 0.2–1.2)
BILIRUB UR-MCNC: NEGATIVE — SIGNIFICANT CHANGE UP
BUN SERPL-MCNC: 18 MG/DL — SIGNIFICANT CHANGE UP (ref 7–23)
CALCIUM SERPL-MCNC: 8.8 MG/DL — SIGNIFICANT CHANGE UP (ref 8.4–10.5)
CHLORIDE SERPL-SCNC: 103 MMOL/L — SIGNIFICANT CHANGE UP (ref 96–108)
CK SERPL-CCNC: 44 U/L — SIGNIFICANT CHANGE UP (ref 25–170)
CO2 SERPL-SCNC: 23 MMOL/L — SIGNIFICANT CHANGE UP (ref 22–31)
COLOR SPEC: COLORLESS — SIGNIFICANT CHANGE UP
CREAT ?TM UR-MCNC: 90 MG/DL — SIGNIFICANT CHANGE UP
CREAT SERPL-MCNC: 1.49 MG/DL — HIGH (ref 0.5–1.3)
DIFF PNL FLD: ABNORMAL
EOSINOPHIL # BLD AUTO: 0.4 K/UL — SIGNIFICANT CHANGE UP (ref 0–0.5)
EOSINOPHIL NFR BLD AUTO: 5.2 % — SIGNIFICANT CHANGE UP (ref 0–6)
EPI CELLS # UR: 1 /HPF — SIGNIFICANT CHANGE UP
FERRITIN SERPL-MCNC: 32 NG/ML — SIGNIFICANT CHANGE UP (ref 15–150)
FOLATE SERPL-MCNC: 18.2 NG/ML — SIGNIFICANT CHANGE UP
GLUCOSE SERPL-MCNC: 92 MG/DL — SIGNIFICANT CHANGE UP (ref 70–99)
GLUCOSE UR QL: NEGATIVE — SIGNIFICANT CHANGE UP
HAPTOGLOB SERPL-MCNC: 233 MG/DL — HIGH (ref 34–200)
HCT VFR BLD CALC: 30.8 % — LOW (ref 34.5–45)
HGB BLD-MCNC: 10.2 G/DL — LOW (ref 11.5–15.5)
HYALINE CASTS # UR AUTO: 2 /LPF — SIGNIFICANT CHANGE UP (ref 0–2)
IRON SATN MFR SERPL: 33 UG/DL — SIGNIFICANT CHANGE UP (ref 30–160)
IRON SATN MFR SERPL: 9 % — LOW (ref 14–50)
KETONES UR-MCNC: NEGATIVE — SIGNIFICANT CHANGE UP
LDH SERPL L TO P-CCNC: 159 U/L — SIGNIFICANT CHANGE UP (ref 50–242)
LEUKOCYTE ESTERASE UR-ACNC: NEGATIVE — SIGNIFICANT CHANGE UP
LYMPHOCYTES # BLD AUTO: 2.4 K/UL — SIGNIFICANT CHANGE UP (ref 1–3.3)
LYMPHOCYTES # BLD AUTO: 28.9 % — SIGNIFICANT CHANGE UP (ref 13–44)
MAGNESIUM SERPL-MCNC: 1.9 MG/DL — SIGNIFICANT CHANGE UP (ref 1.6–2.6)
MCHC RBC-ENTMCNC: 26.9 PG — LOW (ref 27–34)
MCHC RBC-ENTMCNC: 33.1 GM/DL — SIGNIFICANT CHANGE UP (ref 32–36)
MCV RBC AUTO: 81.3 FL — SIGNIFICANT CHANGE UP (ref 80–100)
MONOCYTES # BLD AUTO: 0.5 K/UL — SIGNIFICANT CHANGE UP (ref 0–0.9)
MONOCYTES NFR BLD AUTO: 6.2 % — SIGNIFICANT CHANGE UP (ref 2–14)
NEUTROPHILS # BLD AUTO: 5 K/UL — SIGNIFICANT CHANGE UP (ref 1.8–7.4)
NEUTROPHILS NFR BLD AUTO: 59.4 % — SIGNIFICANT CHANGE UP (ref 43–77)
NITRITE UR-MCNC: NEGATIVE — SIGNIFICANT CHANGE UP
OSMOLALITY SERPL: 292 MOS/KG — SIGNIFICANT CHANGE UP (ref 275–300)
PH UR: 5.5 — SIGNIFICANT CHANGE UP (ref 5–8)
PHOSPHATE SERPL-MCNC: 5.8 MG/DL — HIGH (ref 2.5–4.5)
PLATELET # BLD AUTO: 288 K/UL — SIGNIFICANT CHANGE UP (ref 150–400)
POTASSIUM SERPL-MCNC: 4.2 MMOL/L — SIGNIFICANT CHANGE UP (ref 3.5–5.3)
POTASSIUM SERPL-SCNC: 4.2 MMOL/L — SIGNIFICANT CHANGE UP (ref 3.5–5.3)
PROT SERPL-MCNC: 7.7 G/DL — SIGNIFICANT CHANGE UP (ref 6–8.3)
PROT UR-MCNC: NEGATIVE — SIGNIFICANT CHANGE UP
RBC # BLD: 3.78 M/UL — LOW (ref 3.8–5.2)
RBC # BLD: 3.78 M/UL — LOW (ref 3.8–5.2)
RBC # FLD: 12.8 % — SIGNIFICANT CHANGE UP (ref 10.3–14.5)
RBC CASTS # UR COMP ASSIST: 4 /HPF — SIGNIFICANT CHANGE UP (ref 0–4)
RETICS #: 44.9 K/UL — SIGNIFICANT CHANGE UP (ref 25–125)
RETICS/RBC NFR: 1.2 % — SIGNIFICANT CHANGE UP (ref 0.5–2.5)
SODIUM SERPL-SCNC: 138 MMOL/L — SIGNIFICANT CHANGE UP (ref 135–145)
SODIUM UR-SCNC: 99 MMOL/L — SIGNIFICANT CHANGE UP
SP GR SPEC: 1.02 — SIGNIFICANT CHANGE UP (ref 1.01–1.02)
TIBC SERPL-MCNC: 362 UG/DL — SIGNIFICANT CHANGE UP (ref 220–430)
UIBC SERPL-MCNC: 329 UG/DL — SIGNIFICANT CHANGE UP (ref 110–370)
URATE SERPL-MCNC: 6.6 MG/DL — SIGNIFICANT CHANGE UP (ref 2.5–7)
UROBILINOGEN FLD QL: NEGATIVE — SIGNIFICANT CHANGE UP
VIT B12 SERPL-MCNC: 1737 PG/ML — HIGH (ref 232–1245)
WBC # BLD: 8.4 K/UL — SIGNIFICANT CHANGE UP (ref 3.8–10.5)
WBC # FLD AUTO: 8.4 K/UL — SIGNIFICANT CHANGE UP (ref 3.8–10.5)
WBC UR QL: 1 /HPF — SIGNIFICANT CHANGE UP (ref 0–5)

## 2018-10-06 PROCEDURE — 72070 X-RAY EXAM THORAC SPINE 2VWS: CPT | Mod: 26

## 2018-10-06 PROCEDURE — 72100 X-RAY EXAM L-S SPINE 2/3 VWS: CPT | Mod: 26

## 2018-10-06 PROCEDURE — 99233 SBSQ HOSP IP/OBS HIGH 50: CPT | Mod: GC

## 2018-10-06 RX ORDER — DOCUSATE SODIUM 100 MG
100 CAPSULE ORAL DAILY
Qty: 0 | Refills: 0 | Status: DISCONTINUED | OUTPATIENT
Start: 2018-10-06 | End: 2018-10-09

## 2018-10-06 RX ORDER — SENNA PLUS 8.6 MG/1
2 TABLET ORAL AT BEDTIME
Qty: 0 | Refills: 0 | Status: DISCONTINUED | OUTPATIENT
Start: 2018-10-06 | End: 2018-10-09

## 2018-10-06 RX ORDER — ONDANSETRON 8 MG/1
4 TABLET, FILM COATED ORAL EVERY 6 HOURS
Qty: 0 | Refills: 0 | Status: DISCONTINUED | OUTPATIENT
Start: 2018-10-06 | End: 2018-10-09

## 2018-10-06 RX ORDER — LIDOCAINE 4 G/100G
1 CREAM TOPICAL DAILY
Qty: 0 | Refills: 0 | Status: DISCONTINUED | OUTPATIENT
Start: 2018-10-06 | End: 2018-10-09

## 2018-10-06 RX ADMIN — Medication 100 MILLIGRAM(S): at 12:17

## 2018-10-06 RX ADMIN — Medication 650 MILLIGRAM(S): at 00:17

## 2018-10-06 RX ADMIN — Medication 200 MILLIGRAM(S): at 06:23

## 2018-10-06 RX ADMIN — SODIUM CHLORIDE 100 MILLILITER(S): 9 INJECTION INTRAMUSCULAR; INTRAVENOUS; SUBCUTANEOUS at 06:32

## 2018-10-06 RX ADMIN — Medication 650 MILLIGRAM(S): at 19:00

## 2018-10-06 RX ADMIN — Medication 650 MILLIGRAM(S): at 18:45

## 2018-10-06 RX ADMIN — LIDOCAINE 1 PATCH: 4 CREAM TOPICAL at 12:16

## 2018-10-06 RX ADMIN — Medication 50 MICROGRAM(S): at 06:23

## 2018-10-06 NOTE — PROGRESS NOTE ADULT - ATTENDING COMMENTS
agree.  MARYAM most likely multifactorial, hyaline casts suggest pre renal component and urine sodium mid 90s suggesting intrarenal, ATN most likely 2/2 extensive NSAID use recently.   L flank pain, ?renal vein thrombosis. UPro/Cr, renal duplex and renal vessel duplex ordered urgently.   No synovitis on exam, no rash or oral ulcers, send ESR/CRP, C3/C4, dsDNA.    iron deficiency anemia w/ ferritin < 50, tsat low, will benefit from IV Iron as outpatient. agree.  seen and examined, impressive L flank pain w/ MARYAM.   MARYAM most likely multifactorial, hyaline casts suggest pre renal component and urine sodium mid 90s suggesting intrarenal, ATN most likely 2/2 extensive NSAID use recently.   L flank pain, ?renal vein thrombosis vs. perinephric hematoma.   UPro/Cr, no protein on UA though + RBC. Renal duplex and renal vessel duplex ordered urgently.   No synovitis on exam, no rash or oral ulcers, no evidence of systemic inflammation right now suggesting etiology is lupus related though will still send ESR/CRP, C3/C4, dsDNA.    iron deficiency anemia w/ ferritin < 50, tsat low, will benefit from IV Iron as outpatient. HD stable, no tachycardia or bruising on flanks, no evidence for brisk active bleed.  will trend, await imaging. Pending improvement and findings on U/S may need CT.

## 2018-10-06 NOTE — PROGRESS NOTE ADULT - PROBLEM SELECTOR PLAN 4
- Hb ~10, with recent blood work from 2 weeks ago w Hb 12.2  - no rectal bleeding, no hematemesis; likely menstrual etiology  - obtain iron studies - Hb ~10, with recent blood work from 2 weeks ago w Hb 12.2  - no rectal bleeding, no hematemesis; likely menstrual etiology  - likely not Fe deficiency

## 2018-10-06 NOTE — PROGRESS NOTE ADULT - PROBLEM SELECTOR PLAN 2
- endorsing back pain (on exam reports back muscle tender to palpation, worse with movement) - suspect likely in setting of musculoskeletal pain  - initial suspicion for PE given reported hx of inspiratory back pain, however CTA negative   - lumbar and thoracic spine xray, although low suspicion for bony pathology    - pain control with tylenol, c/w lidocaine patch   - if no improvement consider steroid taper if ok with pt's rheumatologist   - avoid NSAIDs in setting of MARYAM - endorsing back pain (on exam reports back muscle tender to palpation, worse with movement) - suspect likely in setting of musculoskeletal pain   - need to rule out renal vein thrombosis  - initial suspicion for PE given reported hx of inspiratory back pain, however CTA negative   - lumbar and thoracic spine xray, although low suspicion for bony pathology    - pain control with tylenol, c/w lidocaine patch, avoid NSAIDs in setting of MARYAM  - if no improvement consider steroid taper if ok with pt's rheumatologist

## 2018-10-06 NOTE — PROGRESS NOTE ADULT - PROBLEM SELECTOR PLAN 1
- likely 2/2 AIN in setting of new nephrotoxic meds (NSAIDs) in setting of lupus nephritis, but ddx also includes worsening lupus nephritis vs obstructive; need to rule out renal vein thrombosis with US doppler renal, renal US  - baseline Scr ~0.5-0.6   - UA without blood or protein, making GN less likely; would spin UA to look for casts   - trend scr, I/Os  - avoid nephrotoxins, such as NSAIDs; renally dose medications, renal diet   - consider nephrology consult if no improvement - likely 2/2 AIN in setting of new nephrotoxic meds (NSAIDs) in setting of lupus nephritis, but ddx also includes worsening lupus nephritis vs obstructive; need to rule out renal vein thrombosis with US doppler renal, renal US pending  - baseline Scr ~0.5-0.6   - UA without blood or protein, making GN less likely; would spin UA to look for casts   - trend scr, I/Os  - avoid nephrotoxins, such as NSAIDs; renally dose medications, renal diet   - consider nephrology consult if no improvement - likely 2/2 AIN in setting of new nephrotoxic meds (NSAIDs) in setting of lupus nephritis, but ddx also includes worsening lupus nephritis vs obstructive; need to rule out renal vein thrombosis vs nephrolithiasis with US doppler renal, renal US pending  - baseline Scr ~0.5-0.6   - UA without blood or protein, making GN less likely; would spin UA to look for casts   - trend scr, I/Os  - avoid nephrotoxins, such as NSAIDs; renally dose medications, renal diet   - consider nephrology consult if no improvement

## 2018-10-06 NOTE — DOWNTIME INTERRUPTION NOTE - WHICH MANUAL FORMS INITIATED?
documentation hold for POC and A&I flowsheets and adult patient profile  see paper record for additional documentation recorded during downtime

## 2018-10-06 NOTE — PROGRESS NOTE ADULT - PROBLEM SELECTOR PLAN 7
- Patient endorses nausea, no abd pain, no diarrhea, no fever, no sick contacts   -? viral gastroenteritis  - continue on zofran (QTC wnl) PRN  -continue on regular diet, however change if not tolerating  -bowel regimen - Patient endorsed nausea, now improved, no abd pain, no diarrhea, no fever, no sick contacts   - consider viral gastroenteritis  - continue on zofran (QTC wnl) PRN  -continue on regular diet, however change if not tolerating  -bowel regimen

## 2018-10-06 NOTE — PROGRESS NOTE ADULT - SUBJECTIVE AND OBJECTIVE BOX
Josey López, PGY1    Patient is a 38y old  Female who presents with a chief complaint of MARYAM, back pain (05 Oct 2018 23:40)    SUBJECTIVE / OVERNIGHT EVENTS: No acute events overnight. Patient still has the back pain, but improving. Pain is more left sided, so she's Patient denies fever, chills, nausea, or vomiting. Patient has been urinating okay. No hematuria or dysuria, but patient is menstruating. Has been urinating more often in general, but attributes that to drinking more water. Patient had swollen feet a few days ago which has now resolved.     MEDICATIONS  (STANDING):  docusate sodium 100 milliGRAM(s) Oral daily  hydroxychloroquine 200 milliGRAM(s) Oral <User Schedule>  influenza   Vaccine 0.5 milliLiter(s) IntraMuscular once  levothyroxine 50 MICROGram(s) Oral daily  lidocaine   Patch 1 Patch Transdermal daily  senna 2 Tablet(s) Oral at bedtime  sodium chloride 0.9%. 1000 milliLiter(s) (100 mL/Hr) IV Continuous <Continuous>    MEDICATIONS  (PRN):  acetaminophen   Tablet .. 650 milliGRAM(s) Oral every 6 hours PRN Mild Pain (1 - 3), Moderate Pain (4 - 6)  ondansetron    Tablet 4 milliGRAM(s) Oral every 6 hours PRN Nausea and/or Vomiting      Vital Signs Last 24 Hrs  T(C): 36.8 (06 Oct 2018 04:46), Max: 37.1 (05 Oct 2018 13:45)  T(F): 98.2 (06 Oct 2018 04:46), Max: 98.8 (05 Oct 2018 17:50)  HR: 78 (06 Oct 2018 04:46) (64 - 87)  BP: 124/76 (06 Oct 2018 04:46) (124/76 - 153/89)  BP(mean): --  RR: 16 (06 Oct 2018 04:46) (16 - 18)  SpO2: 100% (06 Oct 2018 04:46) (98% - 100%)  CAPILLARY BLOOD GLUCOSE        I&O's Summary    05 Oct 2018 07:01  -  06 Oct 2018 07:00  --------------------------------------------------------  IN: 300 mL / OUT: 400 mL / NET: -100 mL        PHYSICAL EXAM:  GENERAL: NAD, well-developed, young woman sitting comfortably in bed  EYES: conjunctiva and sclera clear  NECK:  No JVD  CHEST/LUNG: CTABL; No wheeze  HEART: RRR; No murmurs  ABDOMEN: Soft, Nondistended; Bowel sounds present; slight tenderness to palpation of the RUQ and RLQ of the abdomen.   EXTREMITIES:  2+ Peripheral Pulses, No edema  MUSCULOSKEL: tenderness to palpation of the L mid back and mid spine region, no tenderness on the right side; L sided CVA tenderness   PSYCH: AAOx3  NEUROLOGY: non-focal  SKIN: No rashes or lesions. No sacral ulcer    LABS:                        10.2   8.4   )-----------( 288      ( 06 Oct 2018 07:19 )             30.8     10-    138  |  103  |  18  ----------------------------<  92  4.2   |  23  |  1.49<H>    Ca    8.8      06 Oct 2018 07:17  Phos  5.8     10-  Mg     1.9     10-    TPro  7.7  /  Alb  3.8  /  TBili  0.2  /  DBili  x   /  AST  16  /  ALT  37  /  AlkPhos  79  10-06    PT/INR - ( 05 Oct 2018 14:58 )   PT: 11.3 sec;   INR: 1.05 ratio         PTT - ( 05 Oct 2018 14:58 )  PTT:28.1 sec  CARDIAC MARKERS ( 06 Oct 2018 07:17 )  x     / x     / 44 U/L / x     / x          Urinalysis Basic - ( 06 Oct 2018 07:20 )    Color: Colorless / Appearance: Clear / S.016 / pH: x  Gluc: x / Ketone: Negative  / Bili: Negative / Urobili: Negative   Blood: x / Protein: Negative / Nitrite: Negative   Leuk Esterase: Negative / RBC: 4 /hpf / WBC 1 /hpf   Sq Epi: x / Non Sq Epi: 1 /hpf / Bacteria: Negative        Microbiology;        RADIOLOGY & ADDITIONAL TESTS:    Imaging Personally Reviewed:          Consultant(s) Notes Reviewed:      Care Discussed with Consultants/Other Providers:

## 2018-10-06 NOTE — PROGRESS NOTE ADULT - ASSESSMENT
37 yo female PMH lupus, stage V nephritis, HTN coming in with subacute L back pain - a/w likely MSK sprain and MARYAM, possibly 2/2 medication effects. Need to rule out renal vein thrombosis.

## 2018-10-07 DIAGNOSIS — D64.9 ANEMIA, UNSPECIFIED: ICD-10-CM

## 2018-10-07 LAB
ANION GAP SERPL CALC-SCNC: 17 MMOL/L — SIGNIFICANT CHANGE UP (ref 5–17)
BUN SERPL-MCNC: 14 MG/DL — SIGNIFICANT CHANGE UP (ref 7–23)
CALCIUM SERPL-MCNC: 9.5 MG/DL — SIGNIFICANT CHANGE UP (ref 8.4–10.5)
CHLORIDE SERPL-SCNC: 103 MMOL/L — SIGNIFICANT CHANGE UP (ref 96–108)
CO2 SERPL-SCNC: 20 MMOL/L — LOW (ref 22–31)
CREAT SERPL-MCNC: 1.26 MG/DL — SIGNIFICANT CHANGE UP (ref 0.5–1.3)
CRP SERPL-MCNC: 0.56 MG/DL — HIGH (ref 0–0.4)
ERYTHROCYTE [SEDIMENTATION RATE] IN BLOOD: 91 MM/HR — HIGH (ref 0–15)
GLUCOSE SERPL-MCNC: 91 MG/DL — SIGNIFICANT CHANGE UP (ref 70–99)
HCT VFR BLD CALC: 30.8 % — LOW (ref 34.5–45)
HGB BLD-MCNC: 10.7 G/DL — LOW (ref 11.5–15.5)
MAGNESIUM SERPL-MCNC: 1.9 MG/DL — SIGNIFICANT CHANGE UP (ref 1.6–2.6)
MCHC RBC-ENTMCNC: 28.1 PG — SIGNIFICANT CHANGE UP (ref 27–34)
MCHC RBC-ENTMCNC: 34.7 GM/DL — SIGNIFICANT CHANGE UP (ref 32–36)
MCV RBC AUTO: 81.1 FL — SIGNIFICANT CHANGE UP (ref 80–100)
PHOSPHATE SERPL-MCNC: 4.5 MG/DL — SIGNIFICANT CHANGE UP (ref 2.5–4.5)
PLATELET # BLD AUTO: 304 K/UL — SIGNIFICANT CHANGE UP (ref 150–400)
POTASSIUM SERPL-MCNC: 3.8 MMOL/L — SIGNIFICANT CHANGE UP (ref 3.5–5.3)
POTASSIUM SERPL-SCNC: 3.8 MMOL/L — SIGNIFICANT CHANGE UP (ref 3.5–5.3)
RBC # BLD: 3.8 M/UL — SIGNIFICANT CHANGE UP (ref 3.8–5.2)
RBC # FLD: 12.8 % — SIGNIFICANT CHANGE UP (ref 10.3–14.5)
SODIUM SERPL-SCNC: 140 MMOL/L — SIGNIFICANT CHANGE UP (ref 135–145)
WBC # BLD: 8.1 K/UL — SIGNIFICANT CHANGE UP (ref 3.8–10.5)
WBC # FLD AUTO: 8.1 K/UL — SIGNIFICANT CHANGE UP (ref 3.8–10.5)

## 2018-10-07 PROCEDURE — 99232 SBSQ HOSP IP/OBS MODERATE 35: CPT | Mod: GC

## 2018-10-07 RX ORDER — ACETAMINOPHEN 500 MG
975 TABLET ORAL EVERY 12 HOURS
Qty: 0 | Refills: 0 | Status: DISCONTINUED | OUTPATIENT
Start: 2018-10-07 | End: 2018-10-09

## 2018-10-07 RX ADMIN — Medication 650 MILLIGRAM(S): at 00:13

## 2018-10-07 RX ADMIN — Medication 975 MILLIGRAM(S): at 18:27

## 2018-10-07 RX ADMIN — Medication 50 MICROGRAM(S): at 05:13

## 2018-10-07 RX ADMIN — Medication 650 MILLIGRAM(S): at 01:10

## 2018-10-07 RX ADMIN — LIDOCAINE 1 PATCH: 4 CREAM TOPICAL at 23:52

## 2018-10-07 RX ADMIN — LIDOCAINE 1 PATCH: 4 CREAM TOPICAL at 11:11

## 2018-10-07 RX ADMIN — LIDOCAINE 1 PATCH: 4 CREAM TOPICAL at 00:11

## 2018-10-07 RX ADMIN — Medication 400 MILLIGRAM(S): at 05:13

## 2018-10-07 RX ADMIN — Medication 100 MILLIGRAM(S): at 11:11

## 2018-10-07 RX ADMIN — Medication 650 MILLIGRAM(S): at 05:26

## 2018-10-07 RX ADMIN — Medication 975 MILLIGRAM(S): at 18:58

## 2018-10-07 RX ADMIN — SENNA PLUS 2 TABLET(S): 8.6 TABLET ORAL at 21:14

## 2018-10-07 RX ADMIN — Medication 650 MILLIGRAM(S): at 04:29

## 2018-10-07 NOTE — PROGRESS NOTE ADULT - ASSESSMENT
37 yo female PMH lupus, stage V nephritis, HTN coming in with subacute L back pain - a/w likely MSK sprain and MARYAM, possibly 2/2 medication effects. Need to rule out renal vein thrombosis. 37 yo female PMH lupus, stage V nephritis, HTN coming in with subacute L back pain - a/w likely MSK sprain and MARYAM, possibly 2/2 medication effects

## 2018-10-07 NOTE — PROGRESS NOTE ADULT - ATTENDING COMMENTS
agree.   MARYAM improving (b/l .7), L flank pain improved though persists. ESR 98.   Called radiology to expedite ultrasounds to facilitate dispo, want to r/o perinephric hematoma (hgb 12 to 10 now stable w/ L flank pain) vs. renal vein thrombosis. Uric acid stone less likely.   If pain not resolved and above u/s are negative, will need to discuss outpatient f/u (w/ normalization of Cr and now MSK pains, menses to explain blood loss and mild hematuria) vs. CTA abdomen to r/o splenic vs. renal arterial clot w/ ESR 98 and hx lupus. I'd probably favor the latter if her Cr normalizes, but it depends on her clinical progress.  Unclear if APLS labs were + in past, f/u w/ outpatient rheum.

## 2018-10-07 NOTE — PROGRESS NOTE ADULT - SUBJECTIVE AND OBJECTIVE BOX
[place], PGY2  Pager: [phone]  After 7: Night Float pager  Alternate contact:     Patient is a 38y old  Female who presents with a chief complaint of MARYAM, back pain (06 Oct 2018 10:00)      SUBJECTIVE / OVERNIGHT EVENTS:  Back pain improving, but still hurts while turning in bed or from sitting to standing. Denies leg pain, difficulty ambulating, passing urine.       MEDICATIONS  (STANDING):  docusate sodium 100 milliGRAM(s) Oral daily  hydroxychloroquine 200 milliGRAM(s) Oral <User Schedule>  hydroxychloroquine 400 milliGRAM(s) Oral <User Schedule>  influenza   Vaccine 0.5 milliLiter(s) IntraMuscular once  levothyroxine 50 MICROGram(s) Oral daily  lidocaine   Patch 1 Patch Transdermal daily  senna 2 Tablet(s) Oral at bedtime  sodium chloride 0.9%. 1000 milliLiter(s) (100 mL/Hr) IV Continuous <Continuous>    MEDICATIONS  (PRN):  acetaminophen   Tablet .. 650 milliGRAM(s) Oral every 6 hours PRN Mild Pain (1 - 3), Moderate Pain (4 - 6)  ondansetron    Tablet 4 milliGRAM(s) Oral every 6 hours PRN Nausea and/or Vomiting      Vital Signs Last 24 Hrs  T(C): 36.8 (07 Oct 2018 04:21), Max: 37.1 (06 Oct 2018 14:37)  T(F): 98.2 (07 Oct 2018 04:21), Max: 98.8 (06 Oct 2018 14:37)  HR: 65 (07 Oct 2018 04:21) (60 - 76)  BP: 110/71 (07 Oct 2018 04:21) (110/71 - 126/81)  BP(mean): --  RR: 18 (07 Oct 2018 04:21) (17 - 18)  SpO2: 100% (07 Oct 2018 04:21) (97% - 100%)  CAPILLARY BLOOD GLUCOSE        I&O's Summary    06 Oct 2018 07:01  -  07 Oct 2018 07:00  --------------------------------------------------------  IN: 1200 mL / OUT: 800 mL / NET: 400 mL        PHYSICAL EXAM:  GENERAL: young female, OOB to chair  EYES: EOMI, PERRLA, conjunctiva and sclera clear  NECK:  No JVD  CHEST/LUNG: CTABL ; No wheeze  HEART: RRR; No murmurs  ABDOMEN: Soft, Nontender, Nondistended; Bowel sounds present  : No [place]  EXTREMITIES:  2+ Peripheral Pulses, No edema  BACK: left paravertebral tenderness at the lumbar level  PSYCH: AAOx3  NEUROLOGY: non-focal  SKIN: No rashes or lesions. No sacral ulcer    LABS:                        10.7   8.1   )-----------( 304      ( 07 Oct 2018 08:06 )             30.8     10-07    140  |  103  |  14  ----------------------------<  91  3.8   |  20<L>  |  1.26    Ca    9.5      07 Oct 2018 08:06  Phos  4.5     10-  Mg     1.9     10-07    TPro  7.7  /  Alb  3.8  /  TBili  0.2  /  DBili  x   /  AST  16  /  ALT  37  /  AlkPhos  79  10-06    PT/INR - ( 05 Oct 2018 14:58 )   PT: 11.3 sec;   INR: 1.05 ratio         PTT - ( 05 Oct 2018 14:58 )  PTT:28.1 sec  CARDIAC MARKERS ( 06 Oct 2018 07:17 )  x     / x     / 44 U/L / x     / x          Urinalysis Basic - ( 06 Oct 2018 07:20 )    Color: Colorless / Appearance: Clear / S.016 / pH: x  Gluc: x / Ketone: Negative  / Bili: Negative / Urobili: Negative   Blood: x / Protein: Negative / Nitrite: Negative   Leuk Esterase: Negative / RBC: 4 /hpf / WBC 1 /hpf   Sq Epi: x / Non Sq Epi: 1 /hpf / Bacteria: Negative        Microbiology;        RADIOLOGY & ADDITIONAL TESTS:    Imaging Personally Reviewed:          < from: CT Angio Chest w/ IV Cont (10.05.18 @ 17:01) >  HEST:     LUNGS AND LARGE AIRWAYS: Patent central airways. Evaluation of the lung   bases is limited secondary to respiratory motion artifact. No   consolidations or pulmonary nodules.  PLEURA: No pleural effusion.  VESSELS: No pulmonary embolism. Limited evaluation of some of the   subsegmental pulmonary arteries due to respiratory motion artifact and   poor contrast opacification.  HEART: Heart size is normal. No pericardial effusion.  MEDIASTINUM AND ELMER: No lymphadenopathy.  CHEST WALL AND LOWER NECK: Within normal limits.  VISUALIZED UPPER ABDOMEN: Unremarkable.  BONES: Mild degenerative changes.    < end of copied text >      Consultant(s) Notes Reviewed:      Care Discussed with Consultants/Other Providers:

## 2018-10-07 NOTE — PROGRESS NOTE ADULT - PROBLEM SELECTOR PLAN 5
- Hold antihypertensive (lisinopril) 2/2 MARYAM BPs acceptable.   - Hold antihypertensive (lisinopril) 2/2 MARYAM

## 2018-10-07 NOTE — PROGRESS NOTE ADULT - PROBLEM SELECTOR PLAN 7
- Patient endorsed nausea, now improved, no abd pain, no diarrhea, no fever, no sick contacts   - consider viral gastroenteritis  - continue on zofran (QTC wnl) PRN  -continue on regular diet, however change if not tolerating  -bowel regimen RESOLVED. Patient endorsed nausea, now improved, no abd pain, no diarrhea, no fever, no sick contacts   - continue on zofran (QTC wnl) PRN  - c/w regular diet  - bowel regimen

## 2018-10-07 NOTE — PROGRESS NOTE ADULT - PROBLEM SELECTOR PLAN 2
- endorsing back pain (on exam reports back muscle tender to palpation, worse with movement) - suspect likely in setting of musculoskeletal pain   - need to rule out renal vein thrombosis  - initial suspicion for PE given reported hx of inspiratory back pain, however CTA negative   - lumbar and thoracic spine xray, although low suspicion for bony pathology    - pain control with tylenol, c/w lidocaine patch, avoid NSAIDs in setting of MARYAM  - if no improvement consider steroid taper if ok with pt's rheumatologist Musculoskeletal back in setting of lifting heavy large size detergent container and TTP on exam  - CTA r/o PE, performed given pain worsened with inspiration  - lumbar and thoracic spine xray performed, f/u official read  - pain control with tylenol, c/w lidocaine patch, avoid NSAIDs in setting of MARYAM  - if no improvement consider steroid taper if ok with pt's rheumatologist

## 2018-10-07 NOTE — PROGRESS NOTE ADULT - PROBLEM SELECTOR PLAN 3
- continue on Plaquenil 200mg once a day, then 400mg next day, alternating doses c/w  Plaquenil 200mg once a day, then 400mg next day, alternating doses

## 2018-10-07 NOTE — PROGRESS NOTE ADULT - PROBLEM SELECTOR PLAN 1
- likely 2/2 AIN in setting of new nephrotoxic meds (NSAIDs) in setting of lupus nephritis, but ddx also includes worsening lupus nephritis vs obstructive; need to rule out renal vein thrombosis vs nephrolithiasis with US doppler renal, renal US pending  - baseline Scr ~0.5-0.6   - UA without blood or protein, making GN less likely; would spin UA to look for casts   - trend scr, I/Os  - avoid nephrotoxins, such as NSAIDs; renally dose medications, renal diet   - consider nephrology consult if no improvement Cr bump from baseline 0.67 to 1.5. Likely  AIN from recent NSAIDs use. DDX lupus nephritis vs renal vein thrombosis vs nephrolithiasis   - baseline Scr ~0.5-0.6, Cr max 1.38, improving  - UA w/o blood or protein,   - avoid nephrotoxins, such as NSAIDs; renally dose medications, renal diet   - f/u renal duplex and US, NPO for test  - consider nephrology consult if no improvement

## 2018-10-08 LAB
ANION GAP SERPL CALC-SCNC: 11 MMOL/L — SIGNIFICANT CHANGE UP (ref 5–17)
BUN SERPL-MCNC: 11 MG/DL — SIGNIFICANT CHANGE UP (ref 7–23)
C3 SERPL-MCNC: 189 MG/DL — HIGH (ref 81–157)
C4 SERPL-MCNC: 37 MG/DL — SIGNIFICANT CHANGE UP (ref 13–39)
CALCIUM SERPL-MCNC: 9.3 MG/DL — SIGNIFICANT CHANGE UP (ref 8.4–10.5)
CHLORIDE SERPL-SCNC: 106 MMOL/L — SIGNIFICANT CHANGE UP (ref 96–108)
CO2 SERPL-SCNC: 26 MMOL/L — SIGNIFICANT CHANGE UP (ref 22–31)
CREAT SERPL-MCNC: 1.22 MG/DL — SIGNIFICANT CHANGE UP (ref 0.5–1.3)
GLUCOSE BLDC GLUCOMTR-MCNC: 92 MG/DL — SIGNIFICANT CHANGE UP (ref 70–99)
GLUCOSE SERPL-MCNC: 87 MG/DL — SIGNIFICANT CHANGE UP (ref 70–99)
HCT VFR BLD CALC: 31.2 % — LOW (ref 34.5–45)
HGB BLD-MCNC: 10.6 G/DL — LOW (ref 11.5–15.5)
MAGNESIUM SERPL-MCNC: 2 MG/DL — SIGNIFICANT CHANGE UP (ref 1.6–2.6)
MCHC RBC-ENTMCNC: 27.5 PG — SIGNIFICANT CHANGE UP (ref 27–34)
MCHC RBC-ENTMCNC: 33.8 GM/DL — SIGNIFICANT CHANGE UP (ref 32–36)
MCV RBC AUTO: 81.4 FL — SIGNIFICANT CHANGE UP (ref 80–100)
PHOSPHATE SERPL-MCNC: 4.7 MG/DL — HIGH (ref 2.5–4.5)
PLATELET # BLD AUTO: 282 K/UL — SIGNIFICANT CHANGE UP (ref 150–400)
POTASSIUM SERPL-MCNC: 4.1 MMOL/L — SIGNIFICANT CHANGE UP (ref 3.5–5.3)
POTASSIUM SERPL-SCNC: 4.1 MMOL/L — SIGNIFICANT CHANGE UP (ref 3.5–5.3)
RBC # BLD: 3.84 M/UL — SIGNIFICANT CHANGE UP (ref 3.8–5.2)
RBC # FLD: 12.6 % — SIGNIFICANT CHANGE UP (ref 10.3–14.5)
SODIUM SERPL-SCNC: 143 MMOL/L — SIGNIFICANT CHANGE UP (ref 135–145)
TRANSFERRIN SERPL-MCNC: 255 MG/DL — SIGNIFICANT CHANGE UP (ref 200–360)
WBC # BLD: 8.3 K/UL — SIGNIFICANT CHANGE UP (ref 3.8–10.5)
WBC # FLD AUTO: 8.3 K/UL — SIGNIFICANT CHANGE UP (ref 3.8–10.5)

## 2018-10-08 PROCEDURE — 99233 SBSQ HOSP IP/OBS HIGH 50: CPT | Mod: GC

## 2018-10-08 PROCEDURE — 93975 VASCULAR STUDY: CPT | Mod: 26

## 2018-10-08 RX ORDER — ONDANSETRON 8 MG/1
4 TABLET, FILM COATED ORAL ONCE
Qty: 0 | Refills: 0 | Status: COMPLETED | OUTPATIENT
Start: 2018-10-08 | End: 2018-10-08

## 2018-10-08 RX ORDER — BENZOCAINE AND MENTHOL 5; 1 G/100ML; G/100ML
1 LIQUID ORAL
Qty: 0 | Refills: 0 | Status: DISCONTINUED | OUTPATIENT
Start: 2018-10-08 | End: 2018-10-09

## 2018-10-08 RX ORDER — ACETAMINOPHEN 500 MG
1000 TABLET ORAL ONCE
Qty: 0 | Refills: 0 | Status: COMPLETED | OUTPATIENT
Start: 2018-10-08 | End: 2018-10-08

## 2018-10-08 RX ORDER — SODIUM CHLORIDE 9 MG/ML
1000 INJECTION, SOLUTION INTRAVENOUS
Qty: 0 | Refills: 0 | Status: DISCONTINUED | OUTPATIENT
Start: 2018-10-08 | End: 2018-10-08

## 2018-10-08 RX ORDER — SODIUM CHLORIDE 9 MG/ML
1000 INJECTION, SOLUTION INTRAVENOUS
Qty: 0 | Refills: 0 | Status: DISCONTINUED | OUTPATIENT
Start: 2018-10-08 | End: 2018-10-09

## 2018-10-08 RX ORDER — SODIUM CHLORIDE 0.65 %
1 AEROSOL, SPRAY (ML) NASAL THREE TIMES A DAY
Qty: 0 | Refills: 0 | Status: DISCONTINUED | OUTPATIENT
Start: 2018-10-08 | End: 2018-10-09

## 2018-10-08 RX ADMIN — Medication 975 MILLIGRAM(S): at 17:43

## 2018-10-08 RX ADMIN — Medication 1 SPRAY(S): at 02:56

## 2018-10-08 RX ADMIN — BENZOCAINE AND MENTHOL 1 LOZENGE: 5; 1 LIQUID ORAL at 01:53

## 2018-10-08 RX ADMIN — SODIUM CHLORIDE 75 MILLILITER(S): 9 INJECTION, SOLUTION INTRAVENOUS at 09:29

## 2018-10-08 RX ADMIN — Medication 400 MILLIGRAM(S): at 12:14

## 2018-10-08 RX ADMIN — ONDANSETRON 4 MILLIGRAM(S): 8 TABLET, FILM COATED ORAL at 08:46

## 2018-10-08 RX ADMIN — BENZOCAINE AND MENTHOL 1 LOZENGE: 5; 1 LIQUID ORAL at 20:47

## 2018-10-08 RX ADMIN — Medication 200 MILLIGRAM(S): at 05:54

## 2018-10-08 RX ADMIN — Medication 50 MICROGRAM(S): at 05:54

## 2018-10-08 RX ADMIN — SENNA PLUS 2 TABLET(S): 8.6 TABLET ORAL at 20:47

## 2018-10-08 RX ADMIN — Medication 975 MILLIGRAM(S): at 05:54

## 2018-10-08 RX ADMIN — ONDANSETRON 4 MILLIGRAM(S): 8 TABLET, FILM COATED ORAL at 08:26

## 2018-10-08 RX ADMIN — Medication 1 SPRAY(S): at 20:48

## 2018-10-08 RX ADMIN — LIDOCAINE 1 PATCH: 4 CREAM TOPICAL at 12:16

## 2018-10-08 RX ADMIN — Medication 1000 MILLIGRAM(S): at 12:45

## 2018-10-08 NOTE — PROGRESS NOTE ADULT - ATTENDING COMMENTS
renal US without evidence of renal vein thrombosis or perinephric hematoma or nephrolithiasis, suspect likely tomorrow if pt continues to clinically improve. d/c IVF if pt tolerating po and nausea resolved. can consider CTA abdomen to r/o splenic vs. renal arterial clot if symptoms do not resolve.

## 2018-10-08 NOTE — PROGRESS NOTE ADULT - SUBJECTIVE AND OBJECTIVE BOX
Josey López, PGY1    Patient is a 38y old  Female who presents with a chief complaint of MARYAM, back pain (07 Oct 2018 12:26)      SUBJECTIVE / OVERNIGHT EVENTS: Overnight patient had a sore throat and received Cepacol lozenges. Also had some nasal congestion for which she got Ocean spray.     MEDICATIONS  (STANDING):  acetaminophen   Tablet .. 975 milliGRAM(s) Oral every 12 hours  docusate sodium 100 milliGRAM(s) Oral daily  hydroxychloroquine 200 milliGRAM(s) Oral <User Schedule>  hydroxychloroquine 400 milliGRAM(s) Oral <User Schedule>  influenza   Vaccine 0.5 milliLiter(s) IntraMuscular once  levothyroxine 50 MICROGram(s) Oral daily  lidocaine   Patch 1 Patch Transdermal daily  senna 2 Tablet(s) Oral at bedtime  sodium chloride 0.9%. 1000 milliLiter(s) (100 mL/Hr) IV Continuous <Continuous>    MEDICATIONS  (PRN):  benzocaine 15 mG/menthol 3.6 mG Lozenge 1 Lozenge Oral four times a day PRN Sore Throat  ondansetron    Tablet 4 milliGRAM(s) Oral every 6 hours PRN Nausea and/or Vomiting  sodium chloride 0.65% Nasal 1 Spray(s) Both Nostrils three times a day PRN Nasal Congestion      Vital Signs Last 24 Hrs  T(C): 36.9 (08 Oct 2018 08:45), Max: 36.9 (08 Oct 2018 08:45)  T(F): 98.4 (08 Oct 2018 08:45), Max: 98.4 (08 Oct 2018 08:45)  HR: 80 (08 Oct 2018 08:45) (71 - 80)  BP: 144/92 (08 Oct 2018 08:45) (106/70 - 144/92)  BP(mean): --  RR: 18 (08 Oct 2018 04:47) (18 - 18)  SpO2: 99% (08 Oct 2018 08:45) (99% - 100%)  CAPILLARY BLOOD GLUCOSE        I&O's Summary      PHYSICAL EXAM:  GENERAL: NAD, well-developed  EYES: EOMI, PERRLA, conjunctiva and sclera clear  NECK:  No JVD  CHEST/LUNG: CTABL ; No wheeze  HEART: RRR; No murmurs  ABDOMEN: Soft, Nontender, Nondistended; Bowel sounds present  : [place]  EXTREMITIES:  2+ Peripheral Pulses, No edema  PSYCH: AAOx3  NEUROLOGY: non-focal  SKIN: No rashes or lesions. No sacral ulcer    LABS:                        10.6   8.3   )-----------( 282      ( 08 Oct 2018 06:47 )             31.2     10-08    143  |  106  |  11  ----------------------------<  87  4.1   |  26  |  1.22    Ca    9.3      08 Oct 2018 06:47  Phos  4.7     10-08  Mg     2.0     10-08                Microbiology;        RADIOLOGY & ADDITIONAL TESTS:    Imaging Personally Reviewed:          Consultant(s) Notes Reviewed:      Care Discussed with Consultants/Other Providers: Josey López, PGY1    Patient is a 38y old  Female who presents with a chief complaint of MARYAM, back pain (07 Oct 2018 12:26)      SUBJECTIVE / OVERNIGHT EVENTS: Overnight patient had a sore throat and received Cepacol lozenges. Also had some nasal congestion for which she got Ocean spray. Patient has a headache this morning. No changes in vision. Had some nausea and vomiting, feeling better now. Patient denies fever and chills. Urinating okay. No swelling of the extremities     MEDICATIONS  (STANDING):  acetaminophen   Tablet .. 975 milliGRAM(s) Oral every 12 hours  docusate sodium 100 milliGRAM(s) Oral daily  hydroxychloroquine 200 milliGRAM(s) Oral <User Schedule>  hydroxychloroquine 400 milliGRAM(s) Oral <User Schedule>  influenza   Vaccine 0.5 milliLiter(s) IntraMuscular once  levothyroxine 50 MICROGram(s) Oral daily  lidocaine   Patch 1 Patch Transdermal daily  senna 2 Tablet(s) Oral at bedtime  sodium chloride 0.9%. 1000 milliLiter(s) (100 mL/Hr) IV Continuous <Continuous>    MEDICATIONS  (PRN):  benzocaine 15 mG/menthol 3.6 mG Lozenge 1 Lozenge Oral four times a day PRN Sore Throat  ondansetron    Tablet 4 milliGRAM(s) Oral every 6 hours PRN Nausea and/or Vomiting  sodium chloride 0.65% Nasal 1 Spray(s) Both Nostrils three times a day PRN Nasal Congestion      Vital Signs Last 24 Hrs  T(C): 36.9 (08 Oct 2018 08:45), Max: 36.9 (08 Oct 2018 08:45)  T(F): 98.4 (08 Oct 2018 08:45), Max: 98.4 (08 Oct 2018 08:45)  HR: 80 (08 Oct 2018 08:45) (71 - 80)  BP: 144/92 (08 Oct 2018 08:45) (106/70 - 144/92)  BP(mean): --  RR: 18 (08 Oct 2018 04:47) (18 - 18)  SpO2: 99% (08 Oct 2018 08:45) (99% - 100%)  CAPILLARY BLOOD GLUCOSE        I&O's Summary      PHYSICAL EXAM:  GENERAL: NAD, well-developed, lying comfortably in bed   EYES: conjunctiva and sclera clear  NECK:  No JVD  CHEST/LUNG: CTABL ; No wheeze  HEART: RRR; No murmurs  ABDOMEN: Soft, Nondistended; Bowel sounds present; tender to palpation in the epigastric region   EXTREMITIES:  2+ Peripheral Pulses, No edema  PSYCH: AAOx3  NEUROLOGY: non-focal  SKIN: No rashes or lesions. No sacral ulcer    LABS:                        10.6   8.3   )-----------( 282      ( 08 Oct 2018 06:47 )             31.2     10-08    143  |  106  |  11  ----------------------------<  87  4.1   |  26  |  1.22    Ca    9.3      08 Oct 2018 06:47  Phos  4.7     10-08  Mg     2.0     10-08 Josey óLpez, PGY1    Patient is a 38y old  Female who presents with a chief complaint of MARYAM, back pain (07 Oct 2018 12:26)      SUBJECTIVE / OVERNIGHT EVENTS: Overnight patient had a sore throat and received Cepacol lozenges. Also had some nasal congestion for which she got Ocean spray. Patient has a headache this morning. No changes in vision. Had some nausea and vomiting, feeling better now. Patient denies fever and chills. Urinating okay. No swelling of the extremities     MEDICATIONS  (STANDING):  acetaminophen   Tablet .. 975 milliGRAM(s) Oral every 12 hours  docusate sodium 100 milliGRAM(s) Oral daily  hydroxychloroquine 200 milliGRAM(s) Oral <User Schedule>  hydroxychloroquine 400 milliGRAM(s) Oral <User Schedule>  influenza   Vaccine 0.5 milliLiter(s) IntraMuscular once  levothyroxine 50 MICROGram(s) Oral daily  lidocaine   Patch 1 Patch Transdermal daily  senna 2 Tablet(s) Oral at bedtime  sodium chloride 0.9%. 1000 milliLiter(s) (100 mL/Hr) IV Continuous <Continuous>    MEDICATIONS  (PRN):  benzocaine 15 mG/menthol 3.6 mG Lozenge 1 Lozenge Oral four times a day PRN Sore Throat  ondansetron    Tablet 4 milliGRAM(s) Oral every 6 hours PRN Nausea and/or Vomiting  sodium chloride 0.65% Nasal 1 Spray(s) Both Nostrils three times a day PRN Nasal Congestion      Vital Signs Last 24 Hrs  T(C): 36.9 (08 Oct 2018 08:45), Max: 36.9 (08 Oct 2018 08:45)  T(F): 98.4 (08 Oct 2018 08:45), Max: 98.4 (08 Oct 2018 08:45)  HR: 80 (08 Oct 2018 08:45) (71 - 80)  BP: 144/92 (08 Oct 2018 08:45) (106/70 - 144/92)  BP(mean): --  RR: 18 (08 Oct 2018 04:47) (18 - 18)  SpO2: 99% (08 Oct 2018 08:45) (99% - 100%)  CAPILLARY BLOOD GLUCOSE        PHYSICAL EXAM:  GENERAL: NAD, well-developed, lying comfortably in bed   EYES: conjunctiva and sclera clear  NECK:  No JVD  CHEST/LUNG: CTABL ; No wheeze  HEART: RRR; No murmurs  ABDOMEN: Soft, Nondistended; Bowel sounds present; tender to palpation in the epigastric region   EXTREMITIES:  2+ Peripheral Pulses, No edema  PSYCH: AAOx3  NEUROLOGY: non-focal  SKIN: No rashes or lesions. No sacral ulcer    LABS:                        10.6   8.3   )-----------( 282      ( 08 Oct 2018 06:47 )             31.2     10-08    143  |  106  |  11  ----------------------------<  87  4.1   |  26  |  1.22    Ca    9.3      08 Oct 2018 06:47  Phos  4.7     10-08  Mg     2.0     10-08 Josey López, PGY1    Patient is a 38y old  Female who presents with a chief complaint of MARYAM, back pain (07 Oct 2018 12:26)      SUBJECTIVE / OVERNIGHT EVENTS: Overnight patient had a sore throat and received Cepacol lozenges. Also had some nasal congestion for which she got Ocean spray. Patient has a headache this morning. No changes in vision. Had some nausea and vomiting, feeling better now. Patient denies fever and chills. Urinating okay. No swelling of the extremities     MEDICATIONS  (STANDING):  acetaminophen   Tablet .. 975 milliGRAM(s) Oral every 12 hours  docusate sodium 100 milliGRAM(s) Oral daily  hydroxychloroquine 200 milliGRAM(s) Oral <User Schedule>  hydroxychloroquine 400 milliGRAM(s) Oral <User Schedule>  influenza   Vaccine 0.5 milliLiter(s) IntraMuscular once  levothyroxine 50 MICROGram(s) Oral daily  lidocaine   Patch 1 Patch Transdermal daily  senna 2 Tablet(s) Oral at bedtime  sodium chloride 0.9%. 1000 milliLiter(s) (100 mL/Hr) IV Continuous <Continuous>    MEDICATIONS  (PRN):  benzocaine 15 mG/menthol 3.6 mG Lozenge 1 Lozenge Oral four times a day PRN Sore Throat  ondansetron    Tablet 4 milliGRAM(s) Oral every 6 hours PRN Nausea and/or Vomiting  sodium chloride 0.65% Nasal 1 Spray(s) Both Nostrils three times a day PRN Nasal Congestion      Vital Signs Last 24 Hrs  T(C): 36.9 (08 Oct 2018 08:45), Max: 36.9 (08 Oct 2018 08:45)  T(F): 98.4 (08 Oct 2018 08:45), Max: 98.4 (08 Oct 2018 08:45)  HR: 80 (08 Oct 2018 08:45) (71 - 80)  BP: 144/92 (08 Oct 2018 08:45) (106/70 - 144/92)  BP(mean): --  RR: 18 (08 Oct 2018 04:47) (18 - 18)  SpO2: 99% (08 Oct 2018 08:45) (99% - 100%)  CAPILLARY BLOOD GLUCOSE      PHYSICAL EXAM:  GENERAL: NAD, well-developed, lying comfortably in bed   EYES: conjunctiva and sclera clear  NECK:  No JVD  CHEST/LUNG: CTABL ; No wheeze  HEART: RRR; No murmurs  ABDOMEN: Soft, Nondistended; Bowel sounds present; tender to palpation in the epigastric region   EXTREMITIES:  2+ Peripheral Pulses, No edema  PSYCH: AAOx3  NEUROLOGY: non-focal  SKIN: No rashes or lesions. No sacral ulcer    LABS:                        10.6   8.3   )-----------( 282      ( 08 Oct 2018 06:47 )             31.2     10-08    143  |  106  |  11  ----------------------------<  87  4.1   |  26  |  1.22    Ca    9.3      08 Oct 2018 06:47  Phos  4.7     10-08  Mg     2.0     10-08

## 2018-10-08 NOTE — PROGRESS NOTE ADULT - PROBLEM SELECTOR PLAN 1
Cr bump from baseline 0.67 to 1.5. Likely AIN from recent NSAIDs use. DDX lupus nephritis vs renal vein thrombosis vs nephrolithiasis   - baseline Scr ~0.5-0.6, now improving with Cr 1.22 today  - UA w/o blood or protein,   - avoid nephrotoxins, such as NSAIDs; renally dose medications, renal diet   - f/u renal duplex and US, NPO for test

## 2018-10-08 NOTE — PROGRESS NOTE ADULT - PROBLEM SELECTOR PLAN 2
Musculoskeletal back in setting of lifting heavy large size detergent container and TTP on exam  - CTA r/o PE, performed given pain worsened with inspiration  - lumbar and thoracic spine xray performed, f/u official read  - pain control with tylenol, c/w lidocaine patch, avoid NSAIDs in setting of MARYAM  - if no improvement consider steroid taper if ok with pt's rheumatologist Musculoskeletal back in setting of lifting heavy large size detergent container and TTP on exam  - CTA r/o PE, performed given pain worsened with inspiration-negative  - lumbar and thoracic spine xray performed, no acute fractures   - pain control with tylenol, c/w lidocaine patch, avoid NSAIDs in setting of MARYAM  - if no improvement consider steroid taper if ok with pt's rheumatologist

## 2018-10-08 NOTE — PROGRESS NOTE ADULT - PROBLEM SELECTOR PLAN 7
RESOLVED. Patient endorsed nausea, now improved, no abd pain, no diarrhea, no fever, no sick contacts   - continue on zofran (QTC wnl) PRN  - c/w regular diet  - bowel regimen

## 2018-10-08 NOTE — PROGRESS NOTE ADULT - PROBLEM SELECTOR PLAN 4
Component of iron deficiency anemia given low ferritin as well as anemia of chronic disease given underlying rheumatologic condition  - Hb stable in 10s  - no overt bleeding

## 2018-10-08 NOTE — PROGRESS NOTE ADULT - ASSESSMENT
37 yo female PMH lupus, stage V nephritis, HTN coming in with subacute L back pain - a/w likely MSK sprain and MARYAM, possibly 2/2 medication effects 37 yo female PMH lupus, stage V nephritis, HTN coming in with subacute L back pain - a/w likely MSK sprain and MARYAM, possibly 2/2 medication effects. Pending US Renal doppler today.

## 2018-10-09 ENCOUNTER — TRANSCRIPTION ENCOUNTER (OUTPATIENT)
Age: 38
End: 2018-10-09

## 2018-10-09 VITALS
HEART RATE: 79 BPM | SYSTOLIC BLOOD PRESSURE: 127 MMHG | TEMPERATURE: 99 F | OXYGEN SATURATION: 100 % | RESPIRATION RATE: 18 BRPM | DIASTOLIC BLOOD PRESSURE: 84 MMHG

## 2018-10-09 LAB
ANION GAP SERPL CALC-SCNC: 11 MMOL/L — SIGNIFICANT CHANGE UP (ref 5–17)
BUN SERPL-MCNC: 10 MG/DL — SIGNIFICANT CHANGE UP (ref 7–23)
CALCIUM SERPL-MCNC: 9 MG/DL — SIGNIFICANT CHANGE UP (ref 8.4–10.5)
CHLORIDE SERPL-SCNC: 107 MMOL/L — SIGNIFICANT CHANGE UP (ref 96–108)
CO2 SERPL-SCNC: 24 MMOL/L — SIGNIFICANT CHANGE UP (ref 22–31)
CREAT SERPL-MCNC: 1.16 MG/DL — SIGNIFICANT CHANGE UP (ref 0.5–1.3)
DSDNA AB SER-ACNC: 71 IU/ML — HIGH
GLUCOSE SERPL-MCNC: 95 MG/DL — SIGNIFICANT CHANGE UP (ref 70–99)
HCT VFR BLD CALC: 32.7 % — LOW (ref 34.5–45)
HGB BLD-MCNC: 10.6 G/DL — LOW (ref 11.5–15.5)
MAGNESIUM SERPL-MCNC: 1.9 MG/DL — SIGNIFICANT CHANGE UP (ref 1.6–2.6)
MCHC RBC-ENTMCNC: 26.1 PG — LOW (ref 27–34)
MCHC RBC-ENTMCNC: 32.3 GM/DL — SIGNIFICANT CHANGE UP (ref 32–36)
MCV RBC AUTO: 80.7 FL — SIGNIFICANT CHANGE UP (ref 80–100)
PHOSPHATE SERPL-MCNC: 3.8 MG/DL — SIGNIFICANT CHANGE UP (ref 2.5–4.5)
PLATELET # BLD AUTO: 315 K/UL — SIGNIFICANT CHANGE UP (ref 150–400)
POTASSIUM SERPL-MCNC: 3.7 MMOL/L — SIGNIFICANT CHANGE UP (ref 3.5–5.3)
POTASSIUM SERPL-SCNC: 3.7 MMOL/L — SIGNIFICANT CHANGE UP (ref 3.5–5.3)
RBC # BLD: 4.05 M/UL — SIGNIFICANT CHANGE UP (ref 3.8–5.2)
RBC # FLD: 12.3 % — SIGNIFICANT CHANGE UP (ref 10.3–14.5)
SODIUM SERPL-SCNC: 142 MMOL/L — SIGNIFICANT CHANGE UP (ref 135–145)
WBC # BLD: 8.7 K/UL — SIGNIFICANT CHANGE UP (ref 3.8–10.5)
WBC # FLD AUTO: 8.7 K/UL — SIGNIFICANT CHANGE UP (ref 3.8–10.5)

## 2018-10-09 PROCEDURE — 85027 COMPLETE CBC AUTOMATED: CPT

## 2018-10-09 PROCEDURE — 82746 ASSAY OF FOLIC ACID SERUM: CPT

## 2018-10-09 PROCEDURE — 84132 ASSAY OF SERUM POTASSIUM: CPT

## 2018-10-09 PROCEDURE — 93005 ELECTROCARDIOGRAM TRACING: CPT

## 2018-10-09 PROCEDURE — 80048 BASIC METABOLIC PNL TOTAL CA: CPT

## 2018-10-09 PROCEDURE — 80053 COMPREHEN METABOLIC PANEL: CPT

## 2018-10-09 PROCEDURE — 72100 X-RAY EXAM L-S SPINE 2/3 VWS: CPT

## 2018-10-09 PROCEDURE — 82803 BLOOD GASES ANY COMBINATION: CPT

## 2018-10-09 PROCEDURE — 93975 VASCULAR STUDY: CPT

## 2018-10-09 PROCEDURE — 83735 ASSAY OF MAGNESIUM: CPT

## 2018-10-09 PROCEDURE — 82607 VITAMIN B-12: CPT

## 2018-10-09 PROCEDURE — 99239 HOSP IP/OBS DSCHRG MGMT >30: CPT

## 2018-10-09 PROCEDURE — 82570 ASSAY OF URINE CREATININE: CPT

## 2018-10-09 PROCEDURE — 83550 IRON BINDING TEST: CPT

## 2018-10-09 PROCEDURE — 83010 ASSAY OF HAPTOGLOBIN QUANT: CPT

## 2018-10-09 PROCEDURE — 81001 URINALYSIS AUTO W/SCOPE: CPT

## 2018-10-09 PROCEDURE — 83930 ASSAY OF BLOOD OSMOLALITY: CPT

## 2018-10-09 PROCEDURE — 86140 C-REACTIVE PROTEIN: CPT

## 2018-10-09 PROCEDURE — 83615 LACTATE (LD) (LDH) ENZYME: CPT

## 2018-10-09 PROCEDURE — 84295 ASSAY OF SERUM SODIUM: CPT

## 2018-10-09 PROCEDURE — 71275 CT ANGIOGRAPHY CHEST: CPT

## 2018-10-09 PROCEDURE — 90686 IIV4 VACC NO PRSV 0.5 ML IM: CPT

## 2018-10-09 PROCEDURE — 86225 DNA ANTIBODY NATIVE: CPT

## 2018-10-09 PROCEDURE — 82947 ASSAY GLUCOSE BLOOD QUANT: CPT

## 2018-10-09 PROCEDURE — 85014 HEMATOCRIT: CPT

## 2018-10-09 PROCEDURE — 86160 COMPLEMENT ANTIGEN: CPT

## 2018-10-09 PROCEDURE — 72070 X-RAY EXAM THORAC SPINE 2VWS: CPT

## 2018-10-09 PROCEDURE — 82435 ASSAY OF BLOOD CHLORIDE: CPT

## 2018-10-09 PROCEDURE — 84300 ASSAY OF URINE SODIUM: CPT

## 2018-10-09 PROCEDURE — 84466 ASSAY OF TRANSFERRIN: CPT

## 2018-10-09 PROCEDURE — 83605 ASSAY OF LACTIC ACID: CPT

## 2018-10-09 PROCEDURE — 82330 ASSAY OF CALCIUM: CPT

## 2018-10-09 PROCEDURE — 85652 RBC SED RATE AUTOMATED: CPT

## 2018-10-09 PROCEDURE — 85045 AUTOMATED RETICULOCYTE COUNT: CPT

## 2018-10-09 PROCEDURE — 82550 ASSAY OF CK (CPK): CPT

## 2018-10-09 PROCEDURE — 99285 EMERGENCY DEPT VISIT HI MDM: CPT

## 2018-10-09 PROCEDURE — 84550 ASSAY OF BLOOD/URIC ACID: CPT

## 2018-10-09 PROCEDURE — 84702 CHORIONIC GONADOTROPIN TEST: CPT

## 2018-10-09 PROCEDURE — 82962 GLUCOSE BLOOD TEST: CPT

## 2018-10-09 PROCEDURE — 84100 ASSAY OF PHOSPHORUS: CPT

## 2018-10-09 PROCEDURE — 82728 ASSAY OF FERRITIN: CPT

## 2018-10-09 PROCEDURE — 85610 PROTHROMBIN TIME: CPT

## 2018-10-09 PROCEDURE — 85730 THROMBOPLASTIN TIME PARTIAL: CPT

## 2018-10-09 RX ORDER — SIMETHICONE 80 MG/1
80 TABLET, CHEWABLE ORAL THREE TIMES A DAY
Qty: 0 | Refills: 0 | Status: DISCONTINUED | OUTPATIENT
Start: 2018-10-09 | End: 2018-10-09

## 2018-10-09 RX ORDER — ONDANSETRON 8 MG/1
1 TABLET, FILM COATED ORAL
Qty: 10 | Refills: 0 | OUTPATIENT
Start: 2018-10-09

## 2018-10-09 RX ORDER — ONDANSETRON 8 MG/1
1 TABLET, FILM COATED ORAL
Qty: 0 | Refills: 0 | COMMUNITY
Start: 2018-10-09

## 2018-10-09 RX ORDER — LIDOCAINE 4 G/100G
1 CREAM TOPICAL ONCE
Qty: 0 | Refills: 0 | Status: COMPLETED | OUTPATIENT
Start: 2018-10-09 | End: 2018-10-09

## 2018-10-09 RX ADMIN — LIDOCAINE 1 PATCH: 4 CREAM TOPICAL at 14:08

## 2018-10-09 RX ADMIN — Medication 975 MILLIGRAM(S): at 05:36

## 2018-10-09 RX ADMIN — LIDOCAINE 1 PATCH: 4 CREAM TOPICAL at 12:12

## 2018-10-09 RX ADMIN — Medication 50 MICROGRAM(S): at 05:36

## 2018-10-09 RX ADMIN — SODIUM CHLORIDE 75 MILLILITER(S): 9 INJECTION, SOLUTION INTRAVENOUS at 05:36

## 2018-10-09 RX ADMIN — LIDOCAINE 1 PATCH: 4 CREAM TOPICAL at 02:00

## 2018-10-09 RX ADMIN — INFLUENZA VIRUS VACCINE 0.5 MILLILITER(S): 15; 15; 15; 15 SUSPENSION INTRAMUSCULAR at 12:10

## 2018-10-09 RX ADMIN — Medication 400 MILLIGRAM(S): at 05:36

## 2018-10-09 RX ADMIN — ONDANSETRON 4 MILLIGRAM(S): 8 TABLET, FILM COATED ORAL at 12:14

## 2018-10-09 RX ADMIN — ONDANSETRON 4 MILLIGRAM(S): 8 TABLET, FILM COATED ORAL at 02:03

## 2018-10-09 RX ADMIN — Medication 100 MILLIGRAM(S): at 12:12

## 2018-10-09 RX ADMIN — LIDOCAINE 1 PATCH: 4 CREAM TOPICAL at 00:39

## 2018-10-09 NOTE — PROGRESS NOTE ADULT - PROBLEM SELECTOR PLAN 8
- IMPROVE 0   - encourage ambulation

## 2018-10-09 NOTE — DISCHARGE NOTE ADULT - PLAN OF CARE
Pain management You came in for back pain, worsening with You came in for back pain, worsening with breathing and movement. We did a chest CT angiogram which showed that you did not have a clot in your lungs. We did X rays of your back, which did not show any fractures. Your back pain is most likely musculoskeletal and you might have pulled a muscle from lifting something. You may take Tylenol for pain. Please avoid Naproxen, Ibuprofen (Motrin, Advil), and other NSAIDs as they caused you to have an acute kidney injury. You can buy any over the counter 4% lidocaine gel patch for pain relief as well. Resolved You were found to have an acute kidney injury. This was most likely because you were taking too much naproxen. Your kidney function improved once we stopped the naproxen and gave you IV fluids. Your creatinine which is a measure of your kidney function is continuing to improve. Please follow up with your primary care physician to ensure that your kidney function continues to improve. Monitor Your hemoglobin has been stable in the 10s. Your hemoglobin in September 2018 was 12. This could be because you are currently menstruating. You do not have any other sources of active bleeding. Please follow up with your primary care physician to monitor your Hemoglobin levels. Continue medical management You have a history of lupus. We continued your home meds while you were in the hospital. Please continue your home meds as prescribed and follow up with your primary care physician. You came in for back pain worse with breathing and movement. We did a chest CT angiogram which showed that you did not have a clot in your lungs. We did X rays of your back, which did not show any fractures. A renal ultrasound was done to rule out any clots in your kidney veins. The ultrasound did not show any clots, any kidney stones, or any masses. Your back pain is most likely musculoskeletal and you might have pulled a muscle from lifting something. You may continue to take Tylenol for pain. Please avoid Naproxen, Ibuprofen (Motrin, Advil), and other NSAIDs as they caused you to have an acute kidney injury. You can buy over the counter Salonpas 4% lidocaine gel patch for pain relief as well. Please follow up with your primary care doctor. You were found to have an acute kidney injury. This was most likely because you were taking too much naproxen. Your kidney function improved once we stopped the naproxen and gave you IV fluids. Your creatinine, which is a measure of your kidney function is continuing to improve. Please follow up with your primary care physician to ensure that your kidney function continues to improve.

## 2018-10-09 NOTE — PROGRESS NOTE ADULT - ATTENDING COMMENTS
renal US without evidence of renal vein thrombosis or perinephric hematoma or nephrolithiasis. pt clinically improved today and tolerating po without continued vomiting. mild nausea controlled with zofran, will give short course supply on d/c. suspect MSK injury and MARYAM caused by nsaids now improved. pt is medically stable for d/c today-- discharge time 40 mins. plan was explained to  at bedside.

## 2018-10-09 NOTE — PROGRESS NOTE ADULT - ASSESSMENT
37 yo female PMH lupus, stage V nephritis, HTN coming in with subacute L back pain - a/w likely MSK sprain and MARYAM, possibly 2/2 medication effects. Renal U/S shows no evidence of renal vein thrombosis.

## 2018-10-09 NOTE — DISCHARGE NOTE ADULT - HOSPITAL COURSE
39 yo female PMH lupus, stage V nephritis, HTN came in with back pain worse with breathing, moving around and lying down, improved with sitting. Patient stated that this has occurred about 3 weeks, achy in nature, L mid back wrapping around to lateral chest/abd wall, 10/10. No new activities, however, did  a heavy object recently. Patient went to see her PMD on Monday, who thought her symptoms may be early flus and started her on Tamiflu and naproxen 500mg BID, which she had been taking for past 3d and states pain improved to 5-6/10. On day of admission, pt was seen by Dr. Olmedo, [facility] and sent to ED for further w/u. Patient endorsed vomiting "all night" after her  gave her his own oxycodone. Patient denied fevers, chills, abd pain, but did have some nausea. States that she has been taking Miralax as she was constipated, but this has resolved. Denied urinary complaints, melena/hematochezia/hematemesis. Denied any recent sick contacts or travels.    Patient had a chest CTA to rule out PE that was negative. Xrays of the spine were done and showed no fractures. A renal duplex was done to rule out renal vein thrombosis given patient's history of lupus, severe pain, and MARYAM. Studies were negative for any thrombus, calculi, mass, or hydronephrosis. 39 yo female PMH lupus, stage V nephritis, HTN came in with back pain worse with breathing, moving around and lying down, improved with sitting. Patient stated that this has occurred about 3 weeks, achy in nature, L mid back wrapping around to lateral chest/abd wall, 10/10. No new activities, however, did  a heavy object recently. Patient went to see her PMD on Monday, who thought her symptoms may be early flus and started her on Tamiflu and naproxen 500mg BID, which she had been taking for past 3d and states pain improved to 5-6/10. On day of admission, pt was seen by Dr. Olmedo, [facility] and sent to ED for further w/u. Patient endorsed vomiting "all night" after her  gave her his own oxycodone. Patient denied fevers, chills, abd pain, but did have some nausea. States that she has been taking Miralax as she was constipated, but this has resolved. Denied urinary complaints, melena/hematochezia/hematemesis. Denied any recent sick contacts or travels.    Patient had a chest CTA to rule out PE that was negative. Xrays of the thoracic and lumbar spine were done and showed no fractures. A renal duplex was done to rule out renal vein thrombosis given patient's history of lupus, severe pain, MARYAM, and was negative.  No evidence any thrombus, calculi, mass, or hydronephrosis. Patient was found to have an MARYAM, most likely intrinsic from naproxen use. Patient's naproxen was held and Cr improved with IVF. Back pain most likely musculoskeletal given history, tenderness to palpation on physical exam, and negative tests. Back pain improved with Tylenol and lidocaine patches.

## 2018-10-09 NOTE — DISCHARGE NOTE ADULT - MEDICATION SUMMARY - MEDICATIONS TO STOP TAKING
I will STOP taking the medications listed below when I get home from the hospital:    Tamiflu 75 mg oral capsule  -- 1 cap(s) by mouth 2 times a day (took 4/5 days)    predniSONE 10 mg oral tablet  -- prednisone 20mg x 3 days, 15mg x 3 days, 10mg x 3 days, then stop (pt took 3 days)

## 2018-10-09 NOTE — PROGRESS NOTE ADULT - SUBJECTIVE AND OBJECTIVE BOX
Josey López, PGY1    Patient is a 38y old  Female who presents with a chief complaint of MARYAM, back pain (08 Oct 2018 08:58)      SUBJECTIVE / OVERNIGHT EVENTS: No overnight acute events.     MEDICATIONS  (STANDING):  acetaminophen   Tablet .. 975 milliGRAM(s) Oral every 12 hours  dextrose 5% + sodium chloride 0.9%. 1000 milliLiter(s) (75 mL/Hr) IV Continuous <Continuous>  docusate sodium 100 milliGRAM(s) Oral daily  hydroxychloroquine 200 milliGRAM(s) Oral <User Schedule>  hydroxychloroquine 400 milliGRAM(s) Oral <User Schedule>  influenza   Vaccine 0.5 milliLiter(s) IntraMuscular once  levothyroxine 50 MICROGram(s) Oral daily  lidocaine   Patch 1 Patch Transdermal daily  senna 2 Tablet(s) Oral at bedtime  sodium chloride 0.9%. 1000 milliLiter(s) (100 mL/Hr) IV Continuous <Continuous>    MEDICATIONS  (PRN):  benzocaine 15 mG/menthol 3.6 mG Lozenge 1 Lozenge Oral four times a day PRN Sore Throat  ondansetron    Tablet 4 milliGRAM(s) Oral every 6 hours PRN Nausea and/or Vomiting  sodium chloride 0.65% Nasal 1 Spray(s) Both Nostrils three times a day PRN Nasal Congestion      Vital Signs Last 24 Hrs  T(C): 37.2 (09 Oct 2018 05:45), Max: 37.4 (08 Oct 2018 20:58)  T(F): 99 (09 Oct 2018 05:45), Max: 99.4 (08 Oct 2018 20:58)  HR: 73 (09 Oct 2018 05:45) (71 - 82)  BP: 116/76 (09 Oct 2018 05:45) (95/69 - 144/92)  BP(mean): --  RR: 18 (09 Oct 2018 05:45) (18 - 18)  SpO2: 100% (09 Oct 2018 05:45) (96% - 100%)  CAPILLARY BLOOD GLUCOSE      POCT Blood Glucose.: 92 mg/dL (08 Oct 2018 08:59)    I&O's Summary    08 Oct 2018 07:01  -  09 Oct 2018 06:51  --------------------------------------------------------  IN: 900 mL / OUT: 0 mL / NET: 900 mL        PHYSICAL EXAM:  GENERAL: NAD, well-developed  EYES: EOMI, PERRLA, conjunctiva and sclera clear  NECK:  No JVD  CHEST/LUNG: CTABL ; No wheeze  HEART: RRR; No murmurs  ABDOMEN: Soft, Nontender, Nondistended; Bowel sounds present  EXTREMITIES:  2+ Peripheral Pulses, No edema  PSYCH: AAOx3  NEUROLOGY: non-focal  SKIN: No rashes or lesions. No sacral ulcer    LABS:                        10.6   8.3   )-----------( 282      ( 08 Oct 2018 06:47 )             31.2     10-08    143  |  106  |  11  ----------------------------<  87  4.1   |  26  |  1.22    Ca    9.3      08 Oct 2018 06:47  Phos  4.7     10-08  Mg     2.0     10-08                  RADIOLOGY & ADDITIONAL TESTS:    Imaging Personally Reviewed:  from: US Duplex Kidneys (10.08.18 @ 15:31)     EXAM:  US DPLX KIDNEY(IES)                            PROCEDURE DATE:  10/08/2018        INTERPRETATION:  CLINICAL INFORMATION: SLE, acute renal insufficiency,   acute back pain. Concern for renal vein thrombosis.    COMPARISON: CT chest dated 5 October.    TECHNIQUE: Color and spectral Doppler evaluation of the kidneys.     FINDINGS:    Right kidney:  9.0 cm. No renal mass, hydronephrosis or calculi.    Left kidney:  10.5 cm. No renal mass, hydronephrosis or calculi.    Color and spectral Doppler reveals normal, symmetric blood flow   throughout both kidneys.    Intrarenal arterial waveforms are of normal morphology.    IVC/Renal Veins: Patent. No echogenic thrombus within the vein lumina.      IMPRESSION:     No duplex evidence of renal vein thrombosis. Josey López, PGY1    Patient is a 38y old  Female who presents with a chief complaint of MARYAM, back pain (08 Oct 2018 08:58)      SUBJECTIVE / OVERNIGHT EVENTS: No overnight acute events. Patient continues to have back pain, but less than previously. Patient also endorses nausea, but no episode of vomiting this morning. Patient urinating normally. Denies headache, chest pain, and shortness of breath. Patient complains of some gas pain today.     MEDICATIONS  (STANDING):  acetaminophen   Tablet .. 975 milliGRAM(s) Oral every 12 hours  dextrose 5% + sodium chloride 0.9%. 1000 milliLiter(s) (75 mL/Hr) IV Continuous <Continuous>  docusate sodium 100 milliGRAM(s) Oral daily  hydroxychloroquine 200 milliGRAM(s) Oral <User Schedule>  hydroxychloroquine 400 milliGRAM(s) Oral <User Schedule>  influenza   Vaccine 0.5 milliLiter(s) IntraMuscular once  levothyroxine 50 MICROGram(s) Oral daily  lidocaine   Patch 1 Patch Transdermal daily  senna 2 Tablet(s) Oral at bedtime  sodium chloride 0.9%. 1000 milliLiter(s) (100 mL/Hr) IV Continuous <Continuous>    MEDICATIONS  (PRN):  benzocaine 15 mG/menthol 3.6 mG Lozenge 1 Lozenge Oral four times a day PRN Sore Throat  ondansetron    Tablet 4 milliGRAM(s) Oral every 6 hours PRN Nausea and/or Vomiting  sodium chloride 0.65% Nasal 1 Spray(s) Both Nostrils three times a day PRN Nasal Congestion    Vital Signs Last 24 Hrs  T(C): 37.2 (09 Oct 2018 05:45), Max: 37.4 (08 Oct 2018 20:58)  T(F): 99 (09 Oct 2018 05:45), Max: 99.4 (08 Oct 2018 20:58)  HR: 73 (09 Oct 2018 05:45) (71 - 82)  BP: 116/76 (09 Oct 2018 05:45) (95/69 - 144/92)  BP(mean): --  RR: 18 (09 Oct 2018 05:45) (18 - 18)  SpO2: 100% (09 Oct 2018 05:45) (96% - 100%)  CAPILLARY BLOOD GLUCOSE      POCT Blood Glucose.: 92 mg/dL (08 Oct 2018 08:59)    I&O's Summary    08 Oct 2018 07:01  -  09 Oct 2018 06:51  --------------------------------------------------------  IN: 900 mL / OUT: 0 mL / NET: 900 mL        PHYSICAL EXAM:  GENERAL: NAD, well-developed  EYES: conjunctiva and sclera clear  NECK:  No JVD  CHEST/LUNG: CTABL; No wheeze  HEART: RRR; No murmurs  ABDOMEN: Soft, Nondistended; Bowel sounds present; mild diffuse tenderness to palpation   EXTREMITIES:  2+ Peripheral Pulses, No edema  PSYCH: AAOx3  NEUROLOGY: non-focal  SKIN: No rashes or lesions. No sacral ulcer    LABS:                        10.6   8.3   )-----------( 282      ( 08 Oct 2018 06:47 )             31.2     10-08    143  |  106  |  11  ----------------------------<  87  4.1   |  26  |  1.22    Ca    9.3      08 Oct 2018 06:47  Phos  4.7     10-08  Mg     2.0     10-08                RADIOLOGY & ADDITIONAL TESTS:    Imaging Personally Reviewed:  from: US Duplex Kidneys (10.08.18 @ 15:31)     EXAM:  US DPLX KIDNEY(IES)                            PROCEDURE DATE:  10/08/2018        INTERPRETATION:  CLINICAL INFORMATION: SLE, acute renal insufficiency,   acute back pain. Concern for renal vein thrombosis.    COMPARISON: CT chest dated 5 October.    TECHNIQUE: Color and spectral Doppler evaluation of the kidneys.     FINDINGS:    Right kidney:  9.0 cm. No renal mass, hydronephrosis or calculi.    Left kidney:  10.5 cm. No renal mass, hydronephrosis or calculi.    Color and spectral Doppler reveals normal, symmetric blood flow   throughout both kidneys.    Intrarenal arterial waveforms are of normal morphology.    IVC/Renal Veins: Patent. No echogenic thrombus within the vein lumina.      IMPRESSION:     No duplex evidence of renal vein thrombosis.

## 2018-10-09 NOTE — DISCHARGE NOTE ADULT - PATIENT PORTAL LINK FT
You can access the [facility] Patient Portal, offered by [facility], by registering with the following website: [email or web address]

## 2018-10-09 NOTE — PROGRESS NOTE ADULT - PROBLEM SELECTOR PROBLEM 3
Lupus (systemic lupus erythematosus)

## 2018-10-09 NOTE — PROGRESS NOTE ADULT - PROBLEM SELECTOR PLAN 5
BPs acceptable.   - Hold antihypertensive (lisinopril) 2/2 MARYAM BPs acceptable.   - Hold antihypertensive (lisinopril) 2/2 MARYAM. can resume on d/c

## 2018-10-09 NOTE — DISCHARGE NOTE ADULT - MEDICATION SUMMARY - MEDICATIONS TO TAKE
I will START or STAY ON the medications listed below when I get home from the hospital:    enalapril 2.5 mg oral tablet  -- 1 tab(s) by mouth once a day  -- Indication: For HTN (hypertension)    diphenhydrAMINE 25 mg oral tablet  -- 1 tab(s) by mouth once a day, As Needed  -- Indication: For Itching    Plaquenil 200 mg oral tablet  -- 1 tab(s) by mouth once a day, 2 tabs the next day, alternating   -- Indication: For Lupus (systemic lupus erythematosus)    hydroquinone 2% topical cream  -- Apply on skin to affected area 2 times a day, legs, back and face  -- Indication: For Skin    hydroquinone 4% topical cream  -- Apply on skin to affected area 2 times a day, hands  -- Indication: For Skin    mometasone 0.1% topical cream  -- Apply on skin to affected area once a day, itching  -- Indication: For Itching    levothyroxine 50 mcg (0.05 mg) oral tablet  -- 1 tab(s) by mouth once a day  -- Indication: For Hypothyroidism I will START or STAY ON the medications listed below when I get home from the hospital:    enalapril 2.5 mg oral tablet  -- 1 tab(s) by mouth once a day  -- Indication: For HTN (hypertension)    diphenhydrAMINE 25 mg oral tablet  -- 1 tab(s) by mouth once a day, As Needed  -- Indication: For Itching    ondansetron 4 mg oral tablet  -- 1 tab(s) by mouth every 6 hours, As needed, Nausea and/or Vomiting  -- Indication: For Nausea    Plaquenil 200 mg oral tablet  -- 1 tab(s) by mouth once a day, 2 tabs the next day, alternating   -- Indication: For Lupus (systemic lupus erythematosus)    hydroquinone 2% topical cream  -- Apply on skin to affected area 2 times a day, legs, back and face  -- Indication: For Skin    hydroquinone 4% topical cream  -- Apply on skin to affected area 2 times a day, hands  -- Indication: For Skin    mometasone 0.1% topical cream  -- Apply on skin to affected area once a day, itching  -- Indication: For Itching    levothyroxine 50 mcg (0.05 mg) oral tablet  -- 1 tab(s) by mouth once a day  -- Indication: For Hypothyroidism

## 2018-10-09 NOTE — DISCHARGE NOTE ADULT - OTHER SIGNIFICANT FINDINGS
from: CT Angio Chest w/ IV Cont (10.05.18 @ 17:01)   IMPRESSION:     No pulmonary embolism or pneumonia. Limited evaluation of some   subsegmental pulmonary arteries.    from: US Duplex Kidneys (10.08.18 @ 15:31)   FINDINGS:    Right kidney:  9.0 cm. No renal mass, hydronephrosis or calculi.    Left kidney:  10.5 cm. No renal mass, hydronephrosis or calculi.  IMPRESSION:     No duplex evidence of renal vein thrombosis.

## 2018-10-09 NOTE — DISCHARGE NOTE ADULT - CARE PLAN
Principal Discharge DX:	Musculoskeletal back pain  Goal:	Pain management  Assessment and plan of treatment:	You came in for back pain, worsening with  Secondary Diagnosis:	MARYAM (acute kidney injury)  Secondary Diagnosis:	Anemia  Secondary Diagnosis:	Lupus (systemic lupus erythematosus)  Secondary Diagnosis:	Hypothyroidism Principal Discharge DX:	Musculoskeletal back pain  Goal:	Pain management  Assessment and plan of treatment:	You came in for back pain, worsening with breathing and movement. We did a chest CT angiogram which showed that you did not have a clot in your lungs. We did X rays of your back, which did not show any fractures. Your back pain is most likely musculoskeletal and you might have pulled a muscle from lifting something. You may take Tylenol for pain. Please avoid Naproxen, Ibuprofen (Motrin, Advil), and other NSAIDs as they caused you to have an acute kidney injury. You can buy any over the counter 4% lidocaine gel patch for pain relief as well.  Secondary Diagnosis:	MARYAM (acute kidney injury)  Goal:	Resolved  Assessment and plan of treatment:	You were found to have an acute kidney injury. This was most likely because you were taking too much naproxen. Your kidney function improved once we stopped the naproxen and gave you IV fluids. Your creatinine which is a measure of your kidney function is continuing to improve. Please follow up with your primary care physician to ensure that your kidney function continues to improve.  Secondary Diagnosis:	Anemia  Goal:	Monitor  Assessment and plan of treatment:	Your hemoglobin has been stable in the 10s. Your hemoglobin in September 2018 was 12. This could be because you are currently menstruating. You do not have any other sources of active bleeding. Please follow up with your primary care physician to monitor your Hemoglobin levels.  Secondary Diagnosis:	Lupus (systemic lupus erythematosus)  Goal:	Continue medical management  Assessment and plan of treatment:	You have a history of lupus. We continued your home meds while you were in the hospital. Please continue your home meds as prescribed and follow up with your primary care physician.  Secondary Diagnosis:	Hypothyroidism Principal Discharge DX:	Musculoskeletal back pain  Goal:	Pain management  Assessment and plan of treatment:	You came in for back pain worse with breathing and movement. We did a chest CT angiogram which showed that you did not have a clot in your lungs. We did X rays of your back, which did not show any fractures. A renal ultrasound was done to rule out any clots in your kidney veins. The ultrasound did not show any clots, any kidney stones, or any masses. Your back pain is most likely musculoskeletal and you might have pulled a muscle from lifting something. You may continue to take Tylenol for pain. Please avoid Naproxen, Ibuprofen (Motrin, Advil), and other NSAIDs as they caused you to have an acute kidney injury. You can buy over the counter Salonpas 4% lidocaine gel patch for pain relief as well. Please follow up with your primary care doctor.  Secondary Diagnosis:	MARYAM (acute kidney injury)  Goal:	Resolved  Assessment and plan of treatment:	You were found to have an acute kidney injury. This was most likely because you were taking too much naproxen. Your kidney function improved once we stopped the naproxen and gave you IV fluids. Your creatinine, which is a measure of your kidney function is continuing to improve. Please follow up with your primary care physician to ensure that your kidney function continues to improve.  Secondary Diagnosis:	Anemia  Goal:	Monitor  Assessment and plan of treatment:	Your hemoglobin has been stable in the 10s. Your hemoglobin in September 2018 was 12. This could be because you are currently menstruating. You do not have any other sources of active bleeding. Please follow up with your primary care physician to monitor your Hemoglobin levels.  Secondary Diagnosis:	Lupus (systemic lupus erythematosus)  Goal:	Continue medical management  Assessment and plan of treatment:	You have a history of lupus. We continued your home meds while you were in the hospital. Please continue your home meds as prescribed and follow up with your primary care physician.  Secondary Diagnosis:	Hypothyroidism

## 2018-10-09 NOTE — PROGRESS NOTE ADULT - PROBLEM SELECTOR PROBLEM 1
MARYAM (acute kidney injury)

## 2018-10-09 NOTE — PROGRESS NOTE ADULT - PROBLEM SELECTOR PLAN 1
Cr bump from baseline 0.67 to 1.5. Likely AIN from recent NSAIDs use. DDX lupus nephritis vs renal vein thrombosis vs nephrolithiasis   - baseline Scr ~0.5-0.6, now improving with Cr 1.22 today  - UA w/o blood or protein,   - avoid nephrotoxins, such as NSAIDs; renally dose medications, renal diet   - f/u renal duplex and US, NPO for test Cr bump from baseline 0.67 to 1.5. Likely AIN from recent NSAIDs use. DDX lupus nephritis vs renal vein thrombosis vs nephrolithiasis   - baseline Scr ~0.5-0.6, now improving with Cr 1.16 today  - UA w/o blood or protein  - avoid nephrotoxins, such as NSAIDs; renally dose medications, renal diet   - renal duplex shows no renal vein thrombosis, mass, calculi, or hydronephrosis.

## 2018-10-09 NOTE — PROGRESS NOTE ADULT - PROBLEM SELECTOR PLAN 2
Musculoskeletal back in setting of lifting heavy large size detergent container and TTP on exam  - CTA r/o PE, performed given pain worsened with inspiration-negative  - lumbar and thoracic spine xray performed, no acute fractures   - pain control with tylenol, c/w lidocaine patch, avoid NSAIDs in setting of MARYAM  - if no improvement consider steroid taper if ok with pt's rheumatologist Musculoskeletal back in setting of lifting heavy large size detergent container and TTP on exam, improving clinically   - CTA  chest r/o PE, performed given pain worsened with inspiration-negative  - lumbar and thoracic spine xray performed, no acute fractures   - pain control with tylenol, c/w lidocaine patch, avoid NSAIDs in setting of MARYAM  - if no improvement, can consider CTA abdomen to rule out clot Musculoskeletal back in setting of lifting heavy large size detergent container and TTP on exam, improving clinically   - CTA chest r/o PE, performed given pain worsened with inspiration-negative  - lumbar and thoracic spine xray performed, no acute fractures   - pain control with tylenol, c/w lidocaine patch, avoid NSAIDs in setting of MARYAM  - patient started on simethicone for gas pain   - if no improvement, can consider CTA abdomen to rule out clot Musculoskeletal back in setting of lifting heavy large size detergent container and TTP on exam, improving clinically   - CTA chest r/o PE, performed given pain worsened with inspiration-negative  - lumbar and thoracic spine xray performed, no acute fractures   - pain control with tylenol, c/w lidocaine patch, avoid NSAIDs in setting of MARYAM  - patient started on simethicone for gas pain

## 2018-11-29 ENCOUNTER — INPATIENT (INPATIENT)
Facility: HOSPITAL | Age: 38
LOS: 4 days | Discharge: HOME | DRG: 378 | End: 2018-12-04
Attending: HOSPITALIST | Admitting: HOSPITALIST
Payer: MEDICAID

## 2018-11-29 VITALS
HEIGHT: 61 IN | TEMPERATURE: 98 F | WEIGHT: 125 LBS | OXYGEN SATURATION: 100 % | HEART RATE: 84 BPM | DIASTOLIC BLOOD PRESSURE: 91 MMHG | RESPIRATION RATE: 16 BRPM | SYSTOLIC BLOOD PRESSURE: 145 MMHG

## 2018-11-29 DIAGNOSIS — K92.2 GASTROINTESTINAL HEMORRHAGE, UNSPECIFIED: ICD-10-CM

## 2018-11-29 LAB
ALBUMIN SERPL ELPH-MCNC: 4.6 G/DL — SIGNIFICANT CHANGE UP (ref 3.3–5)
ALP SERPL-CCNC: 69 U/L — SIGNIFICANT CHANGE UP (ref 40–120)
ALT FLD-CCNC: 18 U/L — SIGNIFICANT CHANGE UP (ref 10–45)
ANION GAP SERPL CALC-SCNC: 16 MMOL/L — SIGNIFICANT CHANGE UP (ref 5–17)
APPEARANCE UR: CLEAR — SIGNIFICANT CHANGE UP
AST SERPL-CCNC: 19 U/L — SIGNIFICANT CHANGE UP (ref 10–40)
BACTERIA # UR AUTO: NEGATIVE — SIGNIFICANT CHANGE UP
BASOPHILS # BLD AUTO: 0 K/UL — SIGNIFICANT CHANGE UP (ref 0–0.2)
BASOPHILS # BLD AUTO: 0 K/UL — SIGNIFICANT CHANGE UP (ref 0–0.2)
BASOPHILS NFR BLD AUTO: 0.1 % — SIGNIFICANT CHANGE UP (ref 0–2)
BASOPHILS NFR BLD AUTO: 0.2 % — SIGNIFICANT CHANGE UP (ref 0–2)
BILIRUB SERPL-MCNC: 0.3 MG/DL — SIGNIFICANT CHANGE UP (ref 0.2–1.2)
BILIRUB UR-MCNC: NEGATIVE — SIGNIFICANT CHANGE UP
BUN SERPL-MCNC: 5 MG/DL — LOW (ref 7–23)
CALCIUM SERPL-MCNC: 9.5 MG/DL — SIGNIFICANT CHANGE UP (ref 8.4–10.5)
CHLORIDE SERPL-SCNC: 103 MMOL/L — SIGNIFICANT CHANGE UP (ref 96–108)
CO2 SERPL-SCNC: 21 MMOL/L — LOW (ref 22–31)
COLOR SPEC: SIGNIFICANT CHANGE UP
CREAT SERPL-MCNC: 0.72 MG/DL — SIGNIFICANT CHANGE UP (ref 0.5–1.3)
DIFF PNL FLD: ABNORMAL
EOSINOPHIL # BLD AUTO: 0.4 K/UL — SIGNIFICANT CHANGE UP (ref 0–0.5)
EOSINOPHIL # BLD AUTO: 0.4 K/UL — SIGNIFICANT CHANGE UP (ref 0–0.5)
EOSINOPHIL NFR BLD AUTO: 4.1 % — SIGNIFICANT CHANGE UP (ref 0–6)
EOSINOPHIL NFR BLD AUTO: 4.3 % — SIGNIFICANT CHANGE UP (ref 0–6)
EPI CELLS # UR: 1 /HPF — SIGNIFICANT CHANGE UP
GLUCOSE SERPL-MCNC: 111 MG/DL — HIGH (ref 70–99)
GLUCOSE UR QL: NEGATIVE — SIGNIFICANT CHANGE UP
HCT VFR BLD CALC: 30.8 % — LOW (ref 34.5–45)
HCT VFR BLD CALC: 35.9 % — SIGNIFICANT CHANGE UP (ref 34.5–45)
HGB BLD-MCNC: 10.6 G/DL — LOW (ref 11.5–15.5)
HGB BLD-MCNC: 12.4 G/DL — SIGNIFICANT CHANGE UP (ref 11.5–15.5)
HYALINE CASTS # UR AUTO: 0 /LPF — SIGNIFICANT CHANGE UP (ref 0–2)
KETONES UR-MCNC: NEGATIVE — SIGNIFICANT CHANGE UP
LEUKOCYTE ESTERASE UR-ACNC: NEGATIVE — SIGNIFICANT CHANGE UP
LYMPHOCYTES # BLD AUTO: 2.9 K/UL — SIGNIFICANT CHANGE UP (ref 1–3.3)
LYMPHOCYTES # BLD AUTO: 28 % — SIGNIFICANT CHANGE UP (ref 13–44)
LYMPHOCYTES # BLD AUTO: 3 K/UL — SIGNIFICANT CHANGE UP (ref 1–3.3)
LYMPHOCYTES # BLD AUTO: 30 % — SIGNIFICANT CHANGE UP (ref 13–44)
MCHC RBC-ENTMCNC: 27 PG — SIGNIFICANT CHANGE UP (ref 27–34)
MCHC RBC-ENTMCNC: 27.3 PG — SIGNIFICANT CHANGE UP (ref 27–34)
MCHC RBC-ENTMCNC: 34.5 GM/DL — SIGNIFICANT CHANGE UP (ref 32–36)
MCHC RBC-ENTMCNC: 34.6 GM/DL — SIGNIFICANT CHANGE UP (ref 32–36)
MCV RBC AUTO: 78.2 FL — LOW (ref 80–100)
MCV RBC AUTO: 78.8 FL — LOW (ref 80–100)
MONOCYTES # BLD AUTO: 0.5 K/UL — SIGNIFICANT CHANGE UP (ref 0–0.9)
MONOCYTES # BLD AUTO: 0.6 K/UL — SIGNIFICANT CHANGE UP (ref 0–0.9)
MONOCYTES NFR BLD AUTO: 4.7 % — SIGNIFICANT CHANGE UP (ref 2–14)
MONOCYTES NFR BLD AUTO: 5.7 % — SIGNIFICANT CHANGE UP (ref 2–14)
NEUTROPHILS # BLD AUTO: 5.8 K/UL — SIGNIFICANT CHANGE UP (ref 1.8–7.4)
NEUTROPHILS # BLD AUTO: 6.8 K/UL — SIGNIFICANT CHANGE UP (ref 1.8–7.4)
NEUTROPHILS NFR BLD AUTO: 59.8 % — SIGNIFICANT CHANGE UP (ref 43–77)
NEUTROPHILS NFR BLD AUTO: 63 % — SIGNIFICANT CHANGE UP (ref 43–77)
NITRITE UR-MCNC: NEGATIVE — SIGNIFICANT CHANGE UP
OB PNL STL: POSITIVE
PH UR: 6.5 — SIGNIFICANT CHANGE UP (ref 5–8)
PLATELET # BLD AUTO: 267 K/UL — SIGNIFICANT CHANGE UP (ref 150–400)
PLATELET # BLD AUTO: 343 K/UL — SIGNIFICANT CHANGE UP (ref 150–400)
POTASSIUM SERPL-MCNC: 3.8 MMOL/L — SIGNIFICANT CHANGE UP (ref 3.5–5.3)
POTASSIUM SERPL-SCNC: 3.8 MMOL/L — SIGNIFICANT CHANGE UP (ref 3.5–5.3)
PROT SERPL-MCNC: 8.4 G/DL — HIGH (ref 6–8.3)
PROT UR-MCNC: NEGATIVE — SIGNIFICANT CHANGE UP
RBC # BLD: 3.94 M/UL — SIGNIFICANT CHANGE UP (ref 3.8–5.2)
RBC # BLD: 4.56 M/UL — SIGNIFICANT CHANGE UP (ref 3.8–5.2)
RBC # FLD: 12.3 % — SIGNIFICANT CHANGE UP (ref 10.3–14.5)
RBC # FLD: 12.6 % — SIGNIFICANT CHANGE UP (ref 10.3–14.5)
RBC CASTS # UR COMP ASSIST: 1 /HPF — SIGNIFICANT CHANGE UP (ref 0–4)
SODIUM SERPL-SCNC: 140 MMOL/L — SIGNIFICANT CHANGE UP (ref 135–145)
SP GR SPEC: 1.01 — LOW (ref 1.01–1.02)
UROBILINOGEN FLD QL: NEGATIVE — SIGNIFICANT CHANGE UP
WBC # BLD: 10.8 K/UL — HIGH (ref 3.8–10.5)
WBC # BLD: 9.6 K/UL — SIGNIFICANT CHANGE UP (ref 3.8–10.5)
WBC # FLD AUTO: 10.8 K/UL — HIGH (ref 3.8–10.5)
WBC # FLD AUTO: 9.6 K/UL — SIGNIFICANT CHANGE UP (ref 3.8–10.5)
WBC UR QL: 1 /HPF — SIGNIFICANT CHANGE UP (ref 0–5)

## 2018-11-29 PROCEDURE — 99285 EMERGENCY DEPT VISIT HI MDM: CPT | Mod: 25

## 2018-11-29 PROCEDURE — 72193 CT PELVIS W/DYE: CPT | Mod: 26

## 2018-11-29 PROCEDURE — 93010 ELECTROCARDIOGRAM REPORT: CPT

## 2018-11-29 RX ORDER — ACETAMINOPHEN 500 MG
650 TABLET ORAL ONCE
Qty: 0 | Refills: 0 | Status: COMPLETED | OUTPATIENT
Start: 2018-11-29 | End: 2018-11-29

## 2018-11-29 RX ORDER — PANTOPRAZOLE SODIUM 20 MG/1
80 TABLET, DELAYED RELEASE ORAL ONCE
Qty: 0 | Refills: 0 | Status: COMPLETED | OUTPATIENT
Start: 2018-11-29 | End: 2018-11-29

## 2018-11-29 RX ORDER — LIDOCAINE 4 G/100G
1 CREAM TOPICAL ONCE
Qty: 0 | Refills: 0 | Status: COMPLETED | OUTPATIENT
Start: 2018-11-29 | End: 2018-11-29

## 2018-11-29 RX ADMIN — Medication 650 MILLIGRAM(S): at 18:43

## 2018-11-29 RX ADMIN — LIDOCAINE 1 PATCH: 4 CREAM TOPICAL at 20:29

## 2018-11-29 NOTE — ED PROVIDER NOTE - OBJECTIVE STATEMENT
37yo F with PMH Lupus (on Plaquenil), HTN, hypothyroidism, admission 1 month ago for MARYAM, presenting with back pain x 3 days. Pt reports "tailbone" pain began first followed by R flank pain. Describes all back pain as severe, sharp, constant, worse when lying on R side and feels worse when urinating. Took tylenol this AM with minimal relief. Pt reports associated subjective chills.  Also notes black colored stool x 2 days. Denies any injury/trauma/falls/heavy lifting. Denies any Cp, SOB, abd pain, n/v/d, BRBPR, dysuria, hematuria, bowel/bladder dysfunction, numbness/tingling.     PCP Rodri Thao 37yo F with PMH Lupus (on Plaquenil), HTN, hypothyroidism, admission 1 month ago for MARYAM, presenting with back pain x 3 days. Pt reports "tailbone" pain began first followed by R flank pain. Describes all back pain as severe, sharp, constant, worse when lying on R side and feels worse when urinating. Pt reports she is only able to lie on L side. Able to ambulate. Took tylenol this AM with minimal relief. Pt reports associated subjective chills. Also notes black colored stool x 2 days. Denies any injury/trauma/falls/heavy lifting. Denies any CP, SOB, abd pain, n/v/d, BRBPR, dysuria, hematuria, bowel/bladder dysfunction, numbness/tingling.     PCP Rodri Thao 39yo F with PMH Lupus (on Plaquenil), HTN, hypothyroidism, admission 1 month ago for MARYAM, presenting with back pain x 3 days. Pt reports "tailbone" pain began first followed by R flank pain. Describes all back pain as severe, sharp, constant, worse when lying on R side and feels worse when urinating. Pt reports she is only able to lie on L side. Able to ambulate. Took tylenol this AM with minimal relief. Pt reports associated subjective chills. Also notes black colored stool x 2 days. Currently menstruating. Denies any injury/trauma/falls/heavy lifting. Denies any CP, SOB, abd pain, n/v/d, BRBPR, dysuria, hematuria, bowel/bladder dysfunction, numbness/tingling.     PCP Rodri Thao

## 2018-11-29 NOTE — ED PROVIDER NOTE - PROGRESS NOTE DETAILS
Pt requesting pain medication, reports pain improved with tylenol earlier. Discussed concern for GI bleed and need for admission for further eval with patient. - Darcie Montano PA-C

## 2018-11-29 NOTE — ED ADULT NURSE NOTE - NSIMPLEMENTINTERV_GEN_ALL_ED
Implemented All Universal Safety Interventions:  [place] to call system. Call bell, personal items and telephone within reach. Instruct patient to call for assistance. Room bathroom lighting operational. Non-slip footwear when patient is off stretcher. Physically safe environment: no spills, clutter or unnecessary equipment. Stretcher in lowest position, wheels locked, appropriate side rails in place.

## 2018-11-29 NOTE — ED PROVIDER NOTE - ATTENDING CONTRIBUTION TO CARE
attending Benito: 38yF h/o Lupus (on Plaquenil), HTN, hypothyroidism, admission 1 month ago for MARYAM, p/w worsening back and buttock pain x 3 days. Pain in buttock when sitting or bending forward. Also with black stools recently. Currently menstruating. On exam, uncomfortable 2/2 pain, abdomen soft/NT, pink conjunctiva, rectal exam no anal fissures or hemorrhoids palpable, brown stool, exquisitely tender over bilateral buttocks close to anal verge without areas of erythema/induration/fluctuance. Will obtain labs, pain control, CT pelvis eval for ?abscess given exquisite tenderness and pt immunocompromised and reassess

## 2018-11-29 NOTE — ED ADULT NURSE NOTE - OBJECTIVE STATEMENT
38 yr old female with  from home with R buttock pain and then later R flank and rectal pain x 3 days, on/off sharp, nonradiating, nontraumatic, "whole leg feels cold", +admits to black tarry stool, +hx lupus, recently adm for MARYAM with similar symptoms, taking tylenol for pain without relief, at present denies urinary symptoms, clear lungs, difficulty with sitting due to worsening pain, +pain worse with position change, skin wdi

## 2018-11-30 DIAGNOSIS — E03.9 HYPOTHYROIDISM, UNSPECIFIED: ICD-10-CM

## 2018-11-30 DIAGNOSIS — I10 ESSENTIAL (PRIMARY) HYPERTENSION: ICD-10-CM

## 2018-11-30 DIAGNOSIS — R10.9 UNSPECIFIED ABDOMINAL PAIN: ICD-10-CM

## 2018-11-30 DIAGNOSIS — K92.1 MELENA: ICD-10-CM

## 2018-11-30 DIAGNOSIS — K62.89 OTHER SPECIFIED DISEASES OF ANUS AND RECTUM: ICD-10-CM

## 2018-11-30 DIAGNOSIS — Z29.9 ENCOUNTER FOR PROPHYLACTIC MEASURES, UNSPECIFIED: ICD-10-CM

## 2018-11-30 DIAGNOSIS — M32.14 GLOMERULAR DISEASE IN SYSTEMIC LUPUS ERYTHEMATOSUS: ICD-10-CM

## 2018-11-30 LAB
ALBUMIN SERPL ELPH-MCNC: 3.9 G/DL — SIGNIFICANT CHANGE UP (ref 3.3–5)
ALP SERPL-CCNC: 60 U/L — SIGNIFICANT CHANGE UP (ref 40–120)
ALT FLD-CCNC: 16 U/L — SIGNIFICANT CHANGE UP (ref 10–45)
ANION GAP SERPL CALC-SCNC: 14 MMOL/L — SIGNIFICANT CHANGE UP (ref 5–17)
AST SERPL-CCNC: 12 U/L — SIGNIFICANT CHANGE UP (ref 10–40)
BILIRUB SERPL-MCNC: 0.4 MG/DL — SIGNIFICANT CHANGE UP (ref 0.2–1.2)
BLD GP AB SCN SERPL QL: NEGATIVE — SIGNIFICANT CHANGE UP
BUN SERPL-MCNC: 6 MG/DL — LOW (ref 7–23)
CALCIUM SERPL-MCNC: 9.2 MG/DL — SIGNIFICANT CHANGE UP (ref 8.4–10.5)
CHLORIDE SERPL-SCNC: 105 MMOL/L — SIGNIFICANT CHANGE UP (ref 96–108)
CO2 SERPL-SCNC: 23 MMOL/L — SIGNIFICANT CHANGE UP (ref 22–31)
CREAT SERPL-MCNC: 0.85 MG/DL — SIGNIFICANT CHANGE UP (ref 0.5–1.3)
CULTURE RESULTS: SIGNIFICANT CHANGE UP
GLUCOSE BLDC GLUCOMTR-MCNC: 106 MG/DL — HIGH (ref 70–99)
GLUCOSE BLDC GLUCOMTR-MCNC: 91 MG/DL — SIGNIFICANT CHANGE UP (ref 70–99)
GLUCOSE SERPL-MCNC: 102 MG/DL — HIGH (ref 70–99)
HCT VFR BLD CALC: 30.1 % — LOW (ref 34.5–45)
HCT VFR BLD CALC: 31.3 % — LOW (ref 34.5–45)
HGB BLD-MCNC: 10.7 G/DL — LOW (ref 11.5–15.5)
HGB BLD-MCNC: 10.7 G/DL — LOW (ref 11.5–15.5)
MAGNESIUM SERPL-MCNC: 2.2 MG/DL — SIGNIFICANT CHANGE UP (ref 1.6–2.6)
MCHC RBC-ENTMCNC: 26.9 PG — LOW (ref 27–34)
MCHC RBC-ENTMCNC: 28 PG — SIGNIFICANT CHANGE UP (ref 27–34)
MCHC RBC-ENTMCNC: 34.1 GM/DL — SIGNIFICANT CHANGE UP (ref 32–36)
MCHC RBC-ENTMCNC: 35.5 GM/DL — SIGNIFICANT CHANGE UP (ref 32–36)
MCV RBC AUTO: 78.8 FL — LOW (ref 80–100)
MCV RBC AUTO: 78.8 FL — LOW (ref 80–100)
PHOSPHATE SERPL-MCNC: 4.2 MG/DL — SIGNIFICANT CHANGE UP (ref 2.5–4.5)
PLATELET # BLD AUTO: 264 K/UL — SIGNIFICANT CHANGE UP (ref 150–400)
PLATELET # BLD AUTO: 277 K/UL — SIGNIFICANT CHANGE UP (ref 150–400)
POTASSIUM SERPL-MCNC: 3.9 MMOL/L — SIGNIFICANT CHANGE UP (ref 3.5–5.3)
POTASSIUM SERPL-SCNC: 3.9 MMOL/L — SIGNIFICANT CHANGE UP (ref 3.5–5.3)
PROT SERPL-MCNC: 7 G/DL — SIGNIFICANT CHANGE UP (ref 6–8.3)
RBC # BLD: 3.82 M/UL — SIGNIFICANT CHANGE UP (ref 3.8–5.2)
RBC # BLD: 3.97 M/UL — SIGNIFICANT CHANGE UP (ref 3.8–5.2)
RBC # FLD: 11.9 % — SIGNIFICANT CHANGE UP (ref 10.3–14.5)
RBC # FLD: 12.4 % — SIGNIFICANT CHANGE UP (ref 10.3–14.5)
RH IG SCN BLD-IMP: POSITIVE — SIGNIFICANT CHANGE UP
SODIUM SERPL-SCNC: 142 MMOL/L — SIGNIFICANT CHANGE UP (ref 135–145)
SPECIMEN SOURCE: SIGNIFICANT CHANGE UP
WBC # BLD: 6.6 K/UL — SIGNIFICANT CHANGE UP (ref 3.8–10.5)
WBC # BLD: 8 K/UL — SIGNIFICANT CHANGE UP (ref 3.8–10.5)
WBC # FLD AUTO: 6.6 K/UL — SIGNIFICANT CHANGE UP (ref 3.8–10.5)
WBC # FLD AUTO: 8 K/UL — SIGNIFICANT CHANGE UP (ref 3.8–10.5)

## 2018-11-30 PROCEDURE — 99233 SBSQ HOSP IP/OBS HIGH 50: CPT | Mod: GC

## 2018-11-30 PROCEDURE — 76705 ECHO EXAM OF ABDOMEN: CPT | Mod: 26,RT

## 2018-11-30 RX ORDER — HYDROCORTISONE 1 %
1 OINTMENT (GRAM) TOPICAL
Qty: 0 | Refills: 0 | Status: DISCONTINUED | OUTPATIENT
Start: 2018-11-30 | End: 2018-12-04

## 2018-11-30 RX ORDER — DIPHENHYDRAMINE HCL 50 MG
1 CAPSULE ORAL
Qty: 0 | Refills: 0 | COMMUNITY

## 2018-11-30 RX ORDER — PANTOPRAZOLE SODIUM 20 MG/1
8 TABLET, DELAYED RELEASE ORAL
Qty: 80 | Refills: 0 | Status: DISCONTINUED | OUTPATIENT
Start: 2018-11-30 | End: 2018-12-02

## 2018-11-30 RX ORDER — MOMETASONE FUROATE 1 MG/G
1 CREAM TOPICAL
Qty: 0 | Refills: 0 | COMMUNITY

## 2018-11-30 RX ORDER — LEVOTHYROXINE SODIUM 125 MCG
1 TABLET ORAL
Qty: 0 | Refills: 0 | COMMUNITY

## 2018-11-30 RX ORDER — HYDROXYCHLOROQUINE SULFATE 200 MG
1 TABLET ORAL
Qty: 0 | Refills: 0 | COMMUNITY

## 2018-11-30 RX ORDER — LIDOCAINE 4 G/100G
1 CREAM TOPICAL EVERY 4 HOURS
Qty: 0 | Refills: 0 | Status: DISCONTINUED | OUTPATIENT
Start: 2018-11-30 | End: 2018-12-04

## 2018-11-30 RX ORDER — DIPHENHYDRAMINE HCL 50 MG
25 CAPSULE ORAL EVERY 4 HOURS
Qty: 0 | Refills: 0 | Status: DISCONTINUED | OUTPATIENT
Start: 2018-11-30 | End: 2018-12-04

## 2018-11-30 RX ORDER — HYDROXYCHLOROQUINE SULFATE 200 MG
200 TABLET ORAL DAILY
Qty: 0 | Refills: 0 | Status: DISCONTINUED | OUTPATIENT
Start: 2018-11-30 | End: 2018-12-04

## 2018-11-30 RX ORDER — LEVOTHYROXINE SODIUM 125 MCG
50 TABLET ORAL DAILY
Qty: 0 | Refills: 0 | Status: DISCONTINUED | OUTPATIENT
Start: 2018-11-30 | End: 2018-12-04

## 2018-11-30 RX ORDER — SODIUM CHLORIDE 9 MG/ML
1000 INJECTION, SOLUTION INTRAVENOUS
Qty: 0 | Refills: 0 | Status: DISCONTINUED | OUTPATIENT
Start: 2018-11-30 | End: 2018-11-30

## 2018-11-30 RX ADMIN — PANTOPRAZOLE SODIUM 80 MILLIGRAM(S): 20 TABLET, DELAYED RELEASE ORAL at 00:00

## 2018-11-30 RX ADMIN — LIDOCAINE 1 PATCH: 4 CREAM TOPICAL at 08:00

## 2018-11-30 RX ADMIN — Medication 50 MICROGRAM(S): at 05:54

## 2018-11-30 RX ADMIN — Medication 25 MILLIGRAM(S): at 15:49

## 2018-11-30 RX ADMIN — SODIUM CHLORIDE 75 MILLILITER(S): 9 INJECTION, SOLUTION INTRAVENOUS at 08:40

## 2018-11-30 RX ADMIN — Medication 650 MILLIGRAM(S): at 00:00

## 2018-11-30 RX ADMIN — Medication 200 MILLIGRAM(S): at 14:46

## 2018-11-30 RX ADMIN — PANTOPRAZOLE SODIUM 10 MG/HR: 20 TABLET, DELAYED RELEASE ORAL at 05:53

## 2018-11-30 NOTE — H&P ADULT - PROBLEM SELECTOR PLAN 1
Patient with new onset melena x 3 days associated with RUQ pain. Low suspicion for hepatobiliary process given no fevers, leukocytosis and normal LFTs. Possible gastritis vs PUD. Denies recent NSAID use  - Hb at baseline of 10s, and HD stable. No further episodes since admission  - s/p protonix 80 mg x 1  - c/w protonix 40 mg IV BID for now  - maintain 2 LArge bore IV access  - obtain blood consent  - GI evaluation Patient with new onset melena x 3 days associated with RUQ pain. Low suspicion for hepatobiliary process given no fevers, leukocytosis and normal LFTs. Possible gastritis vs PUD. Denies recent NSAID use. UA negative for infection  - CT pelvis without overt pathology  - Hb at baseline of 10s, and HD stable. No further episodes since admission  - s/p protonix 80 mg x 1  - c/w protonix gtt  - maintain 2 LArge bore IV access  - trend CBC q8  - NPO for now until GI eval  - obtain blood consent  - GI evaluation

## 2018-11-30 NOTE — H&P ADULT - ASSESSMENT
38 F PMH lupus on Plaquenil, stage V nephritis, HTN, and hypothyroidism coming in with R flank pain and melena for 3 days, found to have 38 F PMH lupus on Plaquenil, stage V nephritis, HTN, and hypothyroidism coming in 3 days of rectal pain and new onset RUQ abdominal pain and melena, concerning for possible upper GI bleed and hemorrhoidal pain

## 2018-11-30 NOTE — PROGRESS NOTE ADULT - ASSESSMENT
38 F PMH lupus on Plaquenil, stage V nephritis, HTN, and hypothyroidism coming in 3 days of rectal pain and new onset RUQ abdominal pain and melena, concerning for possible upper GI bleed and hemorrhoidal pain

## 2018-11-30 NOTE — PROGRESS NOTE ADULT - PROBLEM SELECTOR PLAN 1
Patient with new onset melena x 3 days associated with RUQ pain. Low suspicion for hepatobiliary process given no fevers, leukocytosis and normal LFTs. Possible gastritis vs PUD. Denies recent NSAID use. UA negative for infection  - CT pelvis without overt pathology  - Hb at baseline of 10s, and HD stable. No further episodes since admission  - s/p protonix 80 mg x 1  - c/w protonix gtt  - maintain 2 LArge bore IV access  - trend CBC q8  - NPO for now until GI eval  - obtain blood consent  - GI recs appreciated, NPO for EGD today Patient with new onset melena x 3 days associated with RUQ pain. Low suspicion for hepatobiliary process given no fevers, leukocytosis and normal LFTs. Possible gastritis vs PUD. Denies recent NSAID use. UA negative for infection  - CT pelvis without overt pathology  - Hb at baseline of 10s, and HD stable. No further episodes since admission  - s/p protonix 80 mg x 1  - c/w protonix gtt  - maintain 2 LArge bore IV access  - trend CBC q8  - NPO for now until GI eval  - obtain blood consent  - GI recs appreciated, EGD/Colonoscopy on Monday

## 2018-11-30 NOTE — PROGRESS NOTE ADULT - SUBJECTIVE AND OBJECTIVE BOX
Dustin Muhammad, PGY-1  Internal Medicine  Pager:  [phone]    Patient is a 38y old  Female who presents with a chief complaint of rectal pain, RUQ pain, melena (2018 03:35)      SUBJECTIVE / OVERNIGHT EVENTS:   Patient seen and examined at bedside. No acute events overnight.     MEDICATIONS  (STANDING):  dextrose 5% + lactated ringers. 1000 milliLiter(s) (75 mL/Hr) IV Continuous <Continuous>  hydroxychloroquine 200 milliGRAM(s) Oral daily  levothyroxine 50 MICROGram(s) Oral daily  pantoprazole Infusion 8 mG/Hr (10 mL/Hr) IV Continuous <Continuous>    MEDICATIONS  (PRN):  lidocaine 2% Gel 1 Application(s) Topical every 4 hours PRN rectal pain      CAPILLARY BLOOD GLUCOSE      POCT Blood Glucose.: 106 mg/dL (2018 06:17)      I&O's Summary      T(C): 36.6 (18 @ 08:58), Max: 37.1 (18 @ 18:46)  HR: 64 (18 @ 08:58) (64 - 85)  BP: 117/69 (18 @ 08:58) (112/62 - 145/91)  RR: 17 (18 @ 08:58) (16 - 17)  SpO2: 100% (18 @ 08:58) (98% - 100%)    PHYSICAL EXAM:  GENERAL: NAD, well-developed  HEAD:  Atraumatic, Normocephalic  EYES: PERRL. EOMI,  conjunctiva and sclera clear.  NECK: Supple  CHEST/LUNG: Clear to auscultation bilaterally.  HEART: Regular rate and rhythm. Normal sounding S1, S2. No murmurs, rubs, or gallops  ABDOMEN: Soft, nondistended; Mildly tender in RUQ and right flank. Bowel sounds present.  EXTREMITIES:  No clubbing, cyanosis, or edema. Peripheral pulses 2+ and symmetric.  PSYCH: AAOx3  NEUROLOGY: non-focal  SKIN: No rashes or lesions    LABS:                        10.7   6.6   )-----------( 264      ( 2018 06:29 )             31.3     WBC Trend: 6.6<--, 9.6<--, 10.8<--  11-30    142  |  105  |  6<L>  ----------------------------<  102<H>  3.9   |  23  |  0.85    Ca    9.2      2018 06:29  Phos  4.2       Mg     2.2         TPro  7.0  /  Alb  3.9  /  TBili  0.4  /  DBili  x   /  AST  12  /  ALT  16  /  AlkPhos  60      Creatinine Trend: 0.85<--, 0.72<--        Urinalysis Basic - ( 2018 18:35 )    Color: Light Yellow / Appearance: Clear / S.008 / pH: x  Gluc: x / Ketone: Negative  / Bili: Negative / Urobili: Negative   Blood: x / Protein: Negative / Nitrite: Negative   Leuk Esterase: Negative / RBC: 1 /hpf / WBC 1 /hpf   Sq Epi: x / Non Sq Epi: 1 /hpf / Bacteria: Negative        RADIOLOGY & ADDITIONAL TESTS:    Imaging Personally Reviewed:    Consultant(s) Notes Reviewed:      Care Discussed with Consultants/Other Providers:

## 2018-11-30 NOTE — H&P ADULT - HISTORY OF PRESENT ILLNESS
38 F PMH lupus on Plaquenil, stage V nephritis, HTN, hypothyroidism coming in with R flank pain for 3 days. She describes the pain as sharp, constant and worse with lying on the side. The pain was associated with chills, unsure if she had a fever at home. She has had some diffuse abdominal pain as well as melena associated with her sx. Otherwise denies dysuria, nausea, or vomiting. She is unable to tell whether she has hematuria as she is currently menstruating. Tylenol did not alleviate the pain. Of note, patient had previously been admitted in 10/2018 about for 3 weeks of severe L flank pain with pleuritic like chest pain on L side, workup with CTA, renal duplex, and XR were negative for acute pathology. MARYAM at that time presumed to be related to naproxen use to pain, which resolved on discharge.       In ED, T 97.9, HR 84, BP: 145/91, and 100% on RA. Labs notable for Hb 12.4 --> 10 (w/ fluids), Cr 0.7 from 1.2 (10/2018). FOBT positive. 38 F PMH lupus on Plaquenil, stage V nephritis, HTN, hypothyroidism coming in with rectal pain,  R flank pain, and melena for 3 days. Patient describes the rectal pain as sharp, and very tender when wiping with toilet tissue. She denies bright red blood on the toilet paper when wiping. She denies constipation or diarrhea. The RUQ abdominal pain is with radiation to the right flank,  and worse with lying onto the R side. No fevers or chills.  She has had some diffuse abdominal pain as well as melena associated with her sx. Last episode of melena was prior to coming to the hospital. Otherwise denies dysuria, nausea, or vomiting. She is unable to tell whether she has hematuria as she is currently menstruating. Tylenol did not alleviate the pain. Of note, patient had previously been admitted in 10/2018 about for 3 weeks of severe L flank pain with pleuritic like chest pain on L side, workup with CTA, renal duplex, and XR were negative for acute pathology. MARYAM at that time presumed to be related to naproxen use to pain, which resolved on discharge.       In ED, T 97.9, HR 84, BP: 145/91, and 100% on RA. Labs notable for Hb 12.4 --> 10 (w/ fluids), Cr 0.7 from 1.2 (10/2018). FOBT positive. She was given protonix 80 IV x 1, lidoderm patch to sacrum, tylenol x 2. Ct pelvis unremarkable 38 F PMH lupus on Plaquenil, stage V nephritis, HTN, hypothyroidism coming in with rectal pain,  RUQ, and melena for 3 days. Patient describes the rectal pain as sharp, and very tender when wiping with toilet tissue. She denies bright red blood on the toilet paper when wiping. She denies constipation or diarrhea. The RUQ abdominal pain is with radiation to the right flank,  and worse with lying onto the R side. No fevers or chills.  She has had some diffuse abdominal pain as well as 2-3 episodes melena associated with her sx. Last episode of melena was prior to coming to the hospital. Otherwise denies dysuria, nausea, or vomiting. She is unable to tell whether she has hematuria as she is currently menstruating. Tylenol did not alleviate the pain. No recent NSAID use. She states she hasent eaten in one day. Of note, patient had previously been admitted in 10/2018 about for 3 weeks of severe L flank pain with pleuritic like chest pain on L side, workup with CTA, renal duplex, and XR were negative for acute pathology. MARYAM at that time presumed to be related to naproxen use to pain, which resolved on discharge.     In ED, T 97.9, HR 84, BP: 145/91, and 100% on RA. Labs notable for Hb 12.4 --> 10 on repeat (baseline 10), Cr 0.7 from 1.2 (10/2018). FOBT positive. She was given protonix 80 IV x 1, lidoderm patch to sacrum, tylenol x 2. CT pelvis unremarkable

## 2018-11-30 NOTE — H&P ADULT - NSHPLABSRESULTS_GEN_ALL_CORE
CBC Full  -  ( 2018 22:44 )  WBC Count : 9.6 K/uL  Hemoglobin : 10.6 g/dL  Hematocrit : 30.8 %  Platelet Count - Automated : 267 K/uL  Mean Cell Volume : 78.2 fl  Mean Cell Hemoglobin : 27.0 pg  Mean Cell Hemoglobin Concentration : 34.5 gm/dL  Auto Neutrophil # : 5.8 K/uL  Auto Lymphocyte # : 2.9 K/uL  Auto Monocyte # : 0.6 K/uL  Auto Eosinophil # : 0.4 K/uL  Auto Basophil # : 0.0 K/uL  Auto Neutrophil % : 59.8 %  Auto Lymphocyte % : 30.0 %  Auto Monocyte % : 5.7 %  Auto Eosinophil % : 4.3 %  Auto Basophil % : 0.2 %        140  |  103  |  5<L>  ----------------------------<  111<H>  3.8   |  21<L>  |  0.72    Ca    9.5      2018 18:35    TPro  8.4<H>  /  Alb  4.6  /  TBili  0.3  /  DBili  x   /  AST  19  /  ALT  18  /  AlkPhos  69      LIVER FUNCTIONS - ( 2018 18:35 )  Alb: 4.6 g/dL / Pro: 8.4 g/dL / ALK PHOS: 69 U/L / ALT: 18 U/L / AST: 19 U/L / GGT: x           Urinalysis Basic - ( 2018 18:35 )    Color: Light Yellow / Appearance: Clear / S.008 / pH: x  Gluc: x / Ketone: Negative  / Bili: Negative / Urobili: Negative   Blood: x / Protein: Negative / Nitrite: Negative   Leuk Esterase: Negative / RBC: 1 /hpf / WBC 1 /hpf   Sq Epi: x / Non Sq Epi: 1 /hpf / Bacteria: Negative                EKG:       Imaging:  < from: CT Pelvis w/ IV Cont (18 @ 20:13) >    BLADDER: Within normal limits.  REPRODUCTIVE ORGANS: The uterus and adnexa are within normal limits.  LYMPH NODES: No pelvic lymphadenopathy.    < end of copied text >        ABDOMINAL ORGANS: Within normal limits.  BOWEL: Within normal limits. Appendix not visualized.  PERITONEUM: No ascites.  VESSELS: Within normal limits.  ABDOMINAL WALL: Within normal aj    < end of copied text > CBC Full  -  ( 2018 22:44 )  WBC Count : 9.6 K/uL  Hemoglobin : 10.6 g/dL  Hematocrit : 30.8 %  Platelet Count - Automated : 267 K/uL  Mean Cell Volume : 78.2 fl  Mean Cell Hemoglobin : 27.0 pg  Mean Cell Hemoglobin Concentration : 34.5 gm/dL  Auto Neutrophil # : 5.8 K/uL  Auto Lymphocyte # : 2.9 K/uL  Auto Monocyte # : 0.6 K/uL  Auto Eosinophil # : 0.4 K/uL  Auto Basophil # : 0.0 K/uL  Auto Neutrophil % : 59.8 %  Auto Lymphocyte % : 30.0 %  Auto Monocyte % : 5.7 %  Auto Eosinophil % : 4.3 %  Auto Basophil % : 0.2 %        140  |  103  |  5<L>  ----------------------------<  111<H>  3.8   |  21<L>  |  0.72    Ca    9.5      2018 18:35    TPro  8.4<H>  /  Alb  4.6  /  TBili  0.3  /  DBili  x   /  AST  19  /  ALT  18  /  AlkPhos  69      LIVER FUNCTIONS - ( 2018 18:35 )  Alb: 4.6 g/dL / Pro: 8.4 g/dL / ALK PHOS: 69 U/L / ALT: 18 U/L / AST: 19 U/L / GGT: x           Urinalysis Basic - ( 2018 18:35 )    Color: Light Yellow / Appearance: Clear / S.008 / pH: x  Gluc: x / Ketone: Negative  / Bili: Negative / Urobili: Negative   Blood: x / Protein: Negative / Nitrite: Negative   Leuk Esterase: Negative / RBC: 1 /hpf / WBC 1 /hpf   Sq Epi: x / Non Sq Epi: 1 /hpf / Bacteria: Negative                Imaging:  < from: CT Pelvis w/ IV Cont (18 @ 20:13) >    BLADDER: Within normal limits.  REPRODUCTIVE ORGANS: The uterus and adnexa are within normal limits.  LYMPH NODES: No pelvic lymphadenopathy.    < end of copied text >        ABDOMINAL ORGANS: Within normal limits.  BOWEL: Within normal limits. Appendix not visualized.  PERITONEUM: No ascites.  VESSELS: Within normal limits.  ABDOMINAL WALL: Within normal aj    < end of copied text >

## 2018-11-30 NOTE — ED ADULT NURSE REASSESSMENT NOTE - NS ED NURSE REASSESS COMMENT FT1
Pt a&o x3, resting on stretcher, in no acute distress, vital signs stable. Provided pt with warm pack, toothbrush, and toothpaste as per request. Pt admitted, ready to move, awaiting bed assignment. Stretcher locked in low position. Family at bedside.

## 2018-11-30 NOTE — H&P ADULT - PROBLEM SELECTOR PLAN 2
Patient reporting 3 days of severe perineal and rectal pain. No overt finding on exam except for small internal hemorrhoids. No overt hematochezia.   - start anusol for topical relief  - tylenol prn for mild mod  - oxycodone for severe pain  - avoid NSAID   - eventual luminal evaluation by GI Patient reporting 3 days of severe perineal and rectal pain. No overt finding on exam except for small internal hemorrhoids. No overt hematochezia. CT scan without overt findings but possibly too small to appreciate  - topical lidocaine  - tylenol prn for mild mod  - avoid NSAID   - eventual luminal evaluation by GI

## 2018-11-30 NOTE — H&P ADULT - NSHPPHYSICALEXAM_GEN_ALL_CORE
PHYSICAL EXAM:    Vital Signs Last 24 Hrs  T(C): 36.4 (29 Nov 2018 20:39), Max: 37.1 (29 Nov 2018 18:46)  T(F): 97.6 (29 Nov 2018 20:39), Max: 98.8 (29 Nov 2018 18:46)  HR: 72 (29 Nov 2018 20:39) (72 - 85)  BP: 112/62 (29 Nov 2018 20:39) (112/62 - 145/91)  BP(mean): --  RR: 16 (29 Nov 2018 20:39) (16 - 17)  SpO2: 99% (29 Nov 2018 20:39) (98% - 100%)    General: Young demale resting in bed, no acute distress.  HEENT: NCAT.  PERRL.  EOMI.  No scleral icterus or injection.  Moist MM.    Neck: Supple.  Full ROM.  No JVD.  No lymphadenopathy.   Heart: RRR.  Normal S1 and S2.  No murmurs  Lungs: CTAB. No wheezes  Abdomen: Obese abdomen. + RUQ  and R flank TTP palpation. + BS. No rigidity or guarding  RECTAL: No superficial ulceration, fistula, skin breakdown. Possible small hemorrhoids. No blood on glove. Tenderness in rectal vault as well as superior perineal area  Skin: Warm and dry.  No rashes.  Extremities: No edema  2+ peripheral pulses b/l.  Musculoskeletal: No deformities.  No spinal or paraspinal tenderness.  Neuro: A&Ox3.  CN II-XII intact.  5/5 strength in UE and LE b/l.  Tactile sensation intact in UE and LE b/l.  Cerebellar function intact PHYSICAL EXAM:    Vital Signs Last 24 Hrs  T(C): 36.4 (29 Nov 2018 20:39), Max: 37.1 (29 Nov 2018 18:46)  T(F): 97.6 (29 Nov 2018 20:39), Max: 98.8 (29 Nov 2018 18:46)  HR: 72 (29 Nov 2018 20:39) (72 - 85)  BP: 112/62 (29 Nov 2018 20:39) (112/62 - 145/91)  BP(mean): --  RR: 16 (29 Nov 2018 20:39) (16 - 17)  SpO2: 99% (29 Nov 2018 20:39) (98% - 100%)    General: Young female resting in bed, no acute distress.  HEENT: NCAT.  PERRL.  EOMI.  No scleral icterus or injection.  Moist MM.    Neck: Supple.  Full ROM.  No JVD.  No lymphadenopathy.   Heart: RRR.  Normal S1 and S2.  No murmurs  Lungs: CTAB. No wheezes  Abdomen: Obese abdomen. + RUQ  and right lateral TTP palpation. + BS. No rigidity or guarding. Lidoderm patch in place sacrum  RECTAL: No superficial ulceration, fistula, fissure. skin breakdown. Possible small hemorrhoids. No blood on glove. Tenderness in rectal vault as well as superior perineal area  Skin: Warm and dry.  No rashes.  Extremities: No edema  2+ peripheral pulses b/l.  Musculoskeletal: No deformities.  No spinal or paraspinal tenderness.  Neuro: A&Ox3.  CN II-XII intact.  5/5 strength in UE and LE b/l.  Tactile sensation intact in UE and LE b/l.  Cerebellar function intact

## 2018-11-30 NOTE — PROGRESS NOTE ADULT - PROBLEM SELECTOR PLAN 2
Patient reporting 3 days of severe perineal and rectal pain. No overt finding on exam except for small internal hemorrhoids. No overt hematochezia. CT scan without overt findings but possibly too small to appreciate  - topical lidocaine  - tylenol prn for mild mod  - avoid NSAID   - eventual luminal evaluation by GI

## 2018-11-30 NOTE — CONSULT NOTE ADULT - SUBJECTIVE AND OBJECTIVE BOX
Chief Complaint: Rectal pain    HPI:    37 y/o F w/ hx HTN, hypothyroidism. SLE on Plaquenil c/b lupus nephritis, presenting with rectal pain, with report of RUQ abdominal pain / right sided flank pain, and episode of black tarry stools 3 days prior to admission; GI consulted for endoscopic evaluation.     Ms. Monroe endorses black tarry stools 3 days ago. Shortly thereafter, she developed progressively worsening and sometimes severe, sharp, rectal pain. She denies bloody stools or seeing bright red blood when she wipes. She denies diarrhea and constipation. She denies bloody emesis and coffee ground emesis. She additionally endorses RUQ abdominal pain with radiation to the right flank. The pain is worse when she lies down. She denies fevers/chills, nausea, and vomiting.   The patient was recently admitted in 10/2018 for evaluation of severe left sided flank pain. The patient underwent CTA Chest and duplex U/S of the kidneys, both of which were negative.    In the ED, the patient underwent CTA Chest which was negative for PE, CT Pelvis which did not show an abscess, and duplex U/S of the kidneys which did not show a renal thrombus. The patient was started on a pantoprazole infusion.     The patient had a previous upper endoscopy 1 year ago, which was reportedly normal. She has never had a colonoscopy.    Allergies:  penicillin (Hives)  penicillin (Hives; Rash)    Home Medications:    · 	ondansetron 4 mg oral tablet: 1 tab(s) orally every 6 hours, As needed, Nausea and/or Vomiting  · 	diphenhydrAMINE 25 mg oral tablet: 1 tab(s) orally once a day, As Needed  · 	enalapril 2.5 mg oral tablet: 1 tab(s) orally once a day  · 	Plaquenil 200 mg oral tablet: 1 tab(s) orally once a day, 2 tabs the next day, alternating   · 	hydroquinone 2% topical cream: Apply topically to affected area 2 times a day, legs, back and face  · 	hydroquinone 4% topical cream: Apply topically to affected area 2 times a day, hands  · 	levothyroxine 50 mcg (0.05 mg) oral tablet: 1 tab(s) orally once a day  · 	mometasone 0.1% topical cream: Apply topically to affected area once a day, itching    Hospital Medications:  dextrose 5% + lactated ringers. 1000 milliLiter(s) IV Continuous <Continuous>  hydroxychloroquine 200 milliGRAM(s) Oral daily  levothyroxine 50 MICROGram(s) Oral daily  lidocaine 2% Gel 1 Application(s) Topical every 4 hours PRN  pantoprazole Infusion 8 mG/Hr IV Continuous <Continuous>    PMHX/PSHX:  Lupus (systemic lupus erythematosus)  Hypothyroidism    Family history:     Denies family history of colon cancer/polyps, stomach cancer/polyps, pancreatic cancer/masses, and liver cancer/disease.    Social History:   Tob: Denies  EtOH: Denies  Illicit Drugs: Denies    ROS:     General:  No wt loss, fevers, chills, night sweats, fatigue,   Eyes:  Good vision, no reported pain  ENT:  No sore throat, pain, runny nose, dysphagia  CV:  No pain, palpitations, hypo/hypertension  Pulm:  No dyspnea, cough, tachypnea, wheezing  GI:  + pain, No nausea, No vomiting, No diarrhea, No constipation, No weight loss, No fever, No pruritis, No rectal bleeding, + tarry stools, No dysphagia  :  No pain, bleeding, incontinence, nocturia  Skin:  No rash, tattoos, scars, edema    PHYSICAL EXAM:     GENERAL:  No acute distress  HEENT:  Normocephalic/atraumatic, no scleral icterus  CHEST:  Clear to auscultation bilaterally, no wheezes/rales/ronchi, no accessory muscle use  HEART:  Regular rate and rhythm, no murmurs/rubs/gallops  ABDOMEN:  Soft, non-tender, non-distended, normoactive bowel sounds  BACK: No CVA tenderness  EXTREMITIES: No cyanosis, clubbing, or edema  RECTAL: Patient declined (per ER exam, internal hemorrhoids, but no blood or masses in rectal vault)  SKIN:  No rash/erythema  NEURO:  Alert and oriented x 3, no asterixis    Vital Signs:  Vital Signs Last 24 Hrs  T(C): 36.6 (2018 08:58), Max: 37.1 (2018 18:46)  T(F): 97.9 (2018 08:58), Max: 98.8 (2018 18:46)  HR: 64 (2018 08:58) (64 - 85)  BP: 117/69 (2018 08:58) (112/62 - 145/91)  BP(mean): --  RR: 17 (2018 08:58) (16 - 17)  SpO2: 100% (2018 08:58) (98% - 100%)  Daily Height in cm: 154.94 (2018 16:44)      LABS:                        10.7   6.6   )-----------( 264      ( 2018 06:29 )             31.3     Mean Cell Volume: 78.8 fl (-18 @ 06:29)        142  |  105  |  6<L>  ----------------------------<  102<H>  3.9   |  23  |  0.85    Ca    9.2      2018 06:29  Phos  4.2       Mg     2.2         TPro  7.0  /  Alb  3.9  /  TBili  0.4  /  DBili  x   /  AST  12  /  ALT  16  /  AlkPhos  60      LIVER FUNCTIONS - ( 2018 06:29 )  Alb: 3.9 g/dL / Pro: 7.0 g/dL / ALK PHOS: 60 U/L / ALT: 16 U/L / AST: 12 U/L / GGT: x           Urinalysis Basic - ( 2018 18:35 )    Color: Light Yellow / Appearance: Clear / S.008 / pH: x  Gluc: x / Ketone: Negative  / Bili: Negative / Urobili: Negative   Blood: x / Protein: Negative / Nitrite: Negative   Leuk Esterase: Negative / RBC: 1 /hpf / WBC 1 /hpf   Sq Epi: x / Non Sq Epi: 1 /hpf / Bacteria: Negative               10.7   6.6   )-----------( 264      ( 2018 06:29 )             31.3                         10.6   9.6   )-----------( 267      ( 2018 22:44 )             30.8                         12.4   10.8  )-----------( 343      ( 2018 18:35 )             35.9     Imaging:    < from: CT Pelvis w/ IV Cont (18 @ 20:13) >  FINDINGS:    BLADDER: Within normal limits.  REPRODUCTIVE ORGANS: The uterus and adnexa are within normal limits.  LYMPH NODES: No pelvic lymphadenopathy.    VISUALIZED PORTIONS:    ABDOMINAL ORGANS: Within normal limits.  BOWEL: Within normal limits. Appendix not visualized.  PERITONEUM: No ascites.  VESSELS: Within normal limits.  ABDOMINAL WALL: Within normal limits.  BONES: Within normal limits.    IMPRESSION: No acute intrapelvic pathology. No evidence of abscess.    < end of copied text >    < from: CT Angio Chest w/ IV Cont (10.05.18 @ 17:01) >  FINDINGS:    CHEST:     LUNGS AND LARGE AIRWAYS: Patent central airways. Evaluation of the lung   bases is limited secondary to respiratory motion artifact. No   consolidations or pulmonary nodules.  PLEURA: No pleural effusion.  VESSELS: No pulmonary embolism. Limited evaluation of some of the   subsegmental pulmonary arteries due to respiratory motion artifact and   poor contrast opacification.  HEART: Heart size is normal. No pericardial effusion.  MEDIASTINUM AND ELMER: No lymphadenopathy.  CHEST WALL AND LOWER NECK: Within normal limits.  VISUALIZED UPPER ABDOMEN: Unremarkable.  BONES: Mild degenerative changes.    IMPRESSION:     No pulmonary embolism or pneumonia. Limited evaluation of some   subsegmental pulmonary arteries.    < end of copied text >    < from: US Duplex Kidneys (10.08.18 @ 15:31) >  FINDINGS:    Right kidney:  9.0 cm. No renal mass, hydronephrosis or calculi.    Left kidney:  10.5 cm. No renal mass, hydronephrosis or calculi.    Color and spectral Doppler reveals normal, symmetric blood flow   throughout both kidneys.    Intrarenal arterial waveforms are of normal morphology.    IVC/Renal Veins: Patent. No echogenic thrombus within the vein lumina.      IMPRESSION:     No duplex evidence of renal vein thrombosis.    < end of copied text >

## 2018-11-30 NOTE — H&P ADULT - NSHPREVIEWOFSYSTEMS_GEN_ALL_CORE
REVIEW OF SYSTEMS:    CONSTITUTIONAL: No weakness, fevers or chills  EYES/ENT: No visual changes;  No vertigo or throat pain   NECK: No pain or stiffness  RESPIRATORY: No cough, wheezing, hemoptysis; No shortness of breath  CARDIOVASCULAR: No chest pain or palpitations  GASTROINTESTINAL: + abdominal and rectal pain; nausea, vomiting;  diarrhea or constipation. No hemetemesis,  or hematochezia. +melena  GENITOURINARY: No dysuria, frequency or hematuria  NEUROLOGICAL: No numbness or weakness  SKIN: No itching, burning, rashes, or lesions   All other review of systems is negative unless indicated above.

## 2018-11-30 NOTE — CONSULT NOTE ADULT - ASSESSMENT
Impression:    1. Acute blood loss anemia with melena: Differential diagnosis includes peptic ulcer disease, angioectasias, erosive gastritis, Dieulafoy's lesion, and gastric or duodenal malignancy. May represent right colonic lesion including colonic angioectasia and colorectal malignancy. Patient with no additional episodes of overt GI bleeding, HD stable, and with Hgb of 10.7 which appears to be around her baseline, although Hgb noted to be 12.4 on initial CBC for this admission.   2. Abdominal pain: Differential diagnosis includes peptic ulcer disease, biliary pathology, IBD, ischemic colitis, and nephrolithiasis. Less concern for biliary pathology given normal liver enzymes and no RUQ tenderness appreciated on my exam. No evidence of acute renal vein thrombosis.   3. Rectal pain: Differential diagnosis includes IBD, ischemic colitis, infectious colitis, and colorectal cancer. Less concern for IBD as patient denies diarrhea.  4. SLE c/b lupus nephritis: On Plaquenil   5. HTN  6. Hypothyroidism    Recommendations:  - NPO for EGD today  - Plan for colonoscopy on Monday  - Clear liquid diet on Sunday  - Bowel preparation to be ordered by on-call GI fellow  - Monitor CBCs q8h  - Monitor bowel movements  - Transfuse for goal Hgb >/= 7.0  - Continue pantoprazole infusion at 8 mg/hr  - Two large bore IVs  - Maintain active type and screen  - Supportive care per primary team    Doron Andrews MD  Gastroenterology Fellow  Pager number: [phone] / [phone] Impression:    1. Acute blood loss anemia with melena: Differential diagnosis includes peptic ulcer disease, angioectasias, erosive gastritis, Dieulafoy's lesion, and gastric or duodenal malignancy. May represent right colonic lesion including colonic angioectasia and colorectal malignancy. Patient with no additional episodes of overt GI bleeding, HD stable, and with Hgb of 10.7 which appears to be around her baseline, although Hgb noted to be 12.4 on initial CBC for this admission.   2. Abdominal pain: Differential diagnosis includes peptic ulcer disease, biliary pathology, IBD, ischemic colitis, and nephrolithiasis. Less concern for biliary pathology given normal liver enzymes and no RUQ tenderness appreciated on my exam. No evidence of acute renal vein thrombosis.   3. Rectal pain: Differential diagnosis includes IBD, ischemic colitis, infectious colitis, and colorectal cancer. Less concern for IBD as patient denies diarrhea.  4. SLE c/b lupus nephritis: On Plaquenil   5. HTN  6. Hypothyroidism    Recommendations:  - Plan for EGD/colonoscopy on Monday  - Clear liquid diet on Sunday  - Bowel preparation to be ordered by on-call GI fellow  - Monitor CBCs q8h  - Monitor bowel movements  - Transfuse for goal Hgb >/= 7.0  - Continue pantoprazole infusion at 8 mg/hr  - Two large bore IVs  - Maintain active type and screen  - Supportive care per primary team    Doron Andrews MD  Gastroenterology Fellow  Pager number: [phone] / [phone]

## 2018-11-30 NOTE — H&P ADULT - ATTENDING COMMENTS
In addition to above, pt with insurance concerns as she reports that her coverage is about to end. Will also obtain social work consult.  Will also obtain RUQ ultrasound given RUQ pain, though LFTs unremarkable.

## 2018-12-01 ENCOUNTER — TRANSCRIPTION ENCOUNTER (OUTPATIENT)
Age: 38
End: 2018-12-01

## 2018-12-01 LAB
ALBUMIN SERPL ELPH-MCNC: 4.2 G/DL — SIGNIFICANT CHANGE UP (ref 3.3–5)
ALP SERPL-CCNC: 59 U/L — SIGNIFICANT CHANGE UP (ref 40–120)
ALT FLD-CCNC: 11 U/L — SIGNIFICANT CHANGE UP (ref 10–45)
ANION GAP SERPL CALC-SCNC: 13 MMOL/L — SIGNIFICANT CHANGE UP (ref 5–17)
AST SERPL-CCNC: 13 U/L — SIGNIFICANT CHANGE UP (ref 10–40)
BILIRUB SERPL-MCNC: 0.2 MG/DL — SIGNIFICANT CHANGE UP (ref 0.2–1.2)
BUN SERPL-MCNC: 10 MG/DL — SIGNIFICANT CHANGE UP (ref 7–23)
CALCIUM SERPL-MCNC: 9 MG/DL — SIGNIFICANT CHANGE UP (ref 8.4–10.5)
CHLORIDE SERPL-SCNC: 105 MMOL/L — SIGNIFICANT CHANGE UP (ref 96–108)
CO2 SERPL-SCNC: 22 MMOL/L — SIGNIFICANT CHANGE UP (ref 22–31)
CREAT SERPL-MCNC: 0.77 MG/DL — SIGNIFICANT CHANGE UP (ref 0.5–1.3)
GLUCOSE BLDC GLUCOMTR-MCNC: 120 MG/DL — HIGH (ref 70–99)
GLUCOSE SERPL-MCNC: 116 MG/DL — HIGH (ref 70–99)
HCT VFR BLD CALC: 31.6 % — LOW (ref 34.5–45)
HGB BLD-MCNC: 10.7 G/DL — LOW (ref 11.5–15.5)
MCHC RBC-ENTMCNC: 27.1 PG — SIGNIFICANT CHANGE UP (ref 27–34)
MCHC RBC-ENTMCNC: 33.9 GM/DL — SIGNIFICANT CHANGE UP (ref 32–36)
MCV RBC AUTO: 80 FL — SIGNIFICANT CHANGE UP (ref 80–100)
PLATELET # BLD AUTO: 263 K/UL — SIGNIFICANT CHANGE UP (ref 150–400)
POTASSIUM SERPL-MCNC: 3.8 MMOL/L — SIGNIFICANT CHANGE UP (ref 3.5–5.3)
POTASSIUM SERPL-SCNC: 3.8 MMOL/L — SIGNIFICANT CHANGE UP (ref 3.5–5.3)
PROT SERPL-MCNC: 7.6 G/DL — SIGNIFICANT CHANGE UP (ref 6–8.3)
RBC # BLD: 3.95 M/UL — SIGNIFICANT CHANGE UP (ref 3.8–5.2)
RBC # FLD: 13.4 % — SIGNIFICANT CHANGE UP (ref 10.3–14.5)
SODIUM SERPL-SCNC: 140 MMOL/L — SIGNIFICANT CHANGE UP (ref 135–145)
WBC # BLD: 6.69 K/UL — SIGNIFICANT CHANGE UP (ref 3.8–10.5)
WBC # FLD AUTO: 6.69 K/UL — SIGNIFICANT CHANGE UP (ref 3.8–10.5)

## 2018-12-01 PROCEDURE — 99232 SBSQ HOSP IP/OBS MODERATE 35: CPT | Mod: GC

## 2018-12-01 RX ORDER — PETROLATUM,WHITE
1 JELLY (GRAM) TOPICAL
Qty: 0 | Refills: 0 | Status: DISCONTINUED | OUTPATIENT
Start: 2018-12-01 | End: 2018-12-04

## 2018-12-01 RX ORDER — LANOLIN ALCOHOL/MO/W.PET/CERES
3 CREAM (GRAM) TOPICAL ONCE
Qty: 0 | Refills: 0 | Status: COMPLETED | OUTPATIENT
Start: 2018-12-01 | End: 2018-12-01

## 2018-12-01 RX ORDER — POLYETHYLENE GLYCOL 3350 17 G/17G
17 POWDER, FOR SOLUTION ORAL ONCE
Qty: 0 | Refills: 0 | Status: COMPLETED | OUTPATIENT
Start: 2018-12-01 | End: 2018-12-01

## 2018-12-01 RX ORDER — SOD SULF/SODIUM/NAHCO3/KCL/PEG
1000 SOLUTION, RECONSTITUTED, ORAL ORAL EVERY 4 HOURS
Qty: 0 | Refills: 0 | Status: COMPLETED | OUTPATIENT
Start: 2018-12-02 | End: 2018-12-02

## 2018-12-01 RX ADMIN — LIDOCAINE 1 APPLICATION(S): 4 CREAM TOPICAL at 13:48

## 2018-12-01 RX ADMIN — Medication 25 MILLIGRAM(S): at 20:08

## 2018-12-01 RX ADMIN — Medication 200 MILLIGRAM(S): at 11:27

## 2018-12-01 RX ADMIN — PANTOPRAZOLE SODIUM 10 MG/HR: 20 TABLET, DELAYED RELEASE ORAL at 04:22

## 2018-12-01 RX ADMIN — PANTOPRAZOLE SODIUM 10 MG/HR: 20 TABLET, DELAYED RELEASE ORAL at 13:49

## 2018-12-01 RX ADMIN — Medication 1 APPLICATION(S): at 17:45

## 2018-12-01 RX ADMIN — POLYETHYLENE GLYCOL 3350 17 GRAM(S): 17 POWDER, FOR SOLUTION ORAL at 11:28

## 2018-12-01 RX ADMIN — Medication 50 MICROGRAM(S): at 05:37

## 2018-12-01 NOTE — PROGRESS NOTE ADULT - SUBJECTIVE AND OBJECTIVE BOX
Dustin Muhammad, PGY-1  Internal Medicine  Pager:  [phone]    Patient is a 38y old  Female who presents with a chief complaint of rectal pain, RUQ pain, melena (2018 09:03)      SUBJECTIVE / OVERNIGHT EVENTS:   Patient seen and examined at bedside. No acute events overnight. Patient still complaining of rectal pain. No n/v/d.     MEDICATIONS  (STANDING):  hydroxychloroquine 200 milliGRAM(s) Oral daily  levothyroxine 50 MICROGram(s) Oral daily  pantoprazole Infusion 8 mG/Hr (10 mL/Hr) IV Continuous <Continuous>    MEDICATIONS  (PRN):  diphenhydrAMINE 25 milliGRAM(s) Oral every 4 hours PRN Rash and/or Itching  hydrocortisone hemorrhoidal Suppository 1 Suppository(s) Rectal two times a day PRN rectal pain  lidocaine 2% Gel 1 Application(s) Topical every 4 hours PRN rectal pain      CAPILLARY BLOOD GLUCOSE      POCT Blood Glucose.: 120 mg/dL (01 Dec 2018 00:05)  POCT Blood Glucose.: 91 mg/dL (2018 11:54)      I&O's Summary    2018 07:01  -  01 Dec 2018 07:00  --------------------------------------------------------  IN: 120 mL / OUT: 0 mL / NET: 120 mL        T(C): 36.3 (18 @ 04:21), Max: 36.8 (18 @ 17:55)  HR: 70 (18 @ 04:21) (64 - 84)  BP: 107/62 (18 @ 04:21) (99/65 - 134/85)  RR: 18 (18 @ 04:21) (17 - 18)  SpO2: 99% (18 @ 04:21) (99% - 100%)    PHYSICAL EXAM:  GENERAL: NAD, well-developed  HEAD:  Atraumatic, Normocephalic  EYES: PERRL. EOMI,  conjunctiva and sclera clear.  NECK: Supple  CHEST/LUNG: Clear to auscultation bilaterally.  HEART: Regular rate and rhythm. Normal sounding S1, S2. No murmurs, rubs, or gallops  ABDOMEN: Soft, nontender, nondistended; Bowel sounds present.  EXTREMITIES:  No clubbing, cyanosis, or edema. Peripheral pulses 2+ and symmetric.  PSYCH: AAOx3  NEUROLOGY: non-focal  SKIN: No rashes or lesions    LABS:                        10.7   8.0   )-----------( 277      ( 2018 20:27 )             30.1     WBC Trend: 8.0<--, 6.6<--, 9.6<--  11    142  |  105  |  6<L>  ----------------------------<  102<H>  3.9   |  23  |  0.85    Ca    9.2      2018 06:29  Phos  4.2       Mg     2.2         TPro  7.0  /  Alb  3.9  /  TBili  0.4  /  DBili  x   /  AST  12  /  ALT  16  /  AlkPhos  60      Creatinine Trend: 0.85<--, 0.72<--        Urinalysis Basic - ( 2018 18:35 )    Color: Light Yellow / Appearance: Clear / S.008 / pH: x  Gluc: x / Ketone: Negative  / Bili: Negative / Urobili: Negative   Blood: x / Protein: Negative / Nitrite: Negative   Leuk Esterase: Negative / RBC: 1 /hpf / WBC 1 /hpf   Sq Epi: x / Non Sq Epi: 1 /hpf / Bacteria: Negative        RADIOLOGY & ADDITIONAL TESTS:    Imaging Personally Reviewed:    Consultant(s) Notes Reviewed:      Care Discussed with Consultants/Other Providers: Dustin Muhammad, PGY-1  Internal Medicine  Pager:  [phone]    Patient is a 38y old  Female who presents with a chief complaint of rectal pain, RUQ pain, melena (2018 09:03)      SUBJECTIVE / OVERNIGHT EVENTS:   Patient seen and examined at bedside. No acute events overnight. Patient still complaining of rectal pain and constipation. No n/v/d.     MEDICATIONS  (STANDING):  hydroxychloroquine 200 milliGRAM(s) Oral daily  levothyroxine 50 MICROGram(s) Oral daily  pantoprazole Infusion 8 mG/Hr (10 mL/Hr) IV Continuous <Continuous>    MEDICATIONS  (PRN):  diphenhydrAMINE 25 milliGRAM(s) Oral every 4 hours PRN Rash and/or Itching  hydrocortisone hemorrhoidal Suppository 1 Suppository(s) Rectal two times a day PRN rectal pain  lidocaine 2% Gel 1 Application(s) Topical every 4 hours PRN rectal pain      CAPILLARY BLOOD GLUCOSE      POCT Blood Glucose.: 120 mg/dL (01 Dec 2018 00:05)  POCT Blood Glucose.: 91 mg/dL (2018 11:54)      I&O's Summary    2018 07:01  -  01 Dec 2018 07:00  --------------------------------------------------------  IN: 120 mL / OUT: 0 mL / NET: 120 mL        T(C): 36.3 (18 @ 04:21), Max: 36.8 (18 @ 17:55)  HR: 70 (18 @ 04:21) (64 - 84)  BP: 107/62 (18 @ 04:21) (99/65 - 134/85)  RR: 18 (18 @ 04:21) (17 - 18)  SpO2: 99% (18 @ 04:21) (99% - 100%)    PHYSICAL EXAM:  GENERAL: NAD, well-developed  HEAD:  Atraumatic, Normocephalic  EYES: PERRL. EOMI,  conjunctiva and sclera clear.  NECK: Supple  CHEST/LUNG: Clear to auscultation bilaterally.  HEART: Regular rate and rhythm. Normal sounding S1, S2. No murmurs, rubs, or gallops  ABDOMEN: Soft, nontender, nondistended; Bowel sounds present.  EXTREMITIES:  No clubbing, cyanosis, or edema. Peripheral pulses 2+ and symmetric.  PSYCH: AAOx3  NEUROLOGY: non-focal  SKIN: No rashes or lesions    LABS:                        10.7   8.0   )-----------( 277      ( 2018 20:27 )             30.1     WBC Trend: 8.0<--, 6.6<--, 9.6<--  11    142  |  105  |  6<L>  ----------------------------<  102<H>  3.9   |  23  |  0.85    Ca    9.2      2018 06:29  Phos  4.2       Mg     2.2         TPro  7.0  /  Alb  3.9  /  TBili  0.4  /  DBili  x   /  AST  12  /  ALT  16  /  AlkPhos  60      Creatinine Trend: 0.85<--, 0.72<--        Urinalysis Basic - ( 2018 18:35 )    Color: Light Yellow / Appearance: Clear / S.008 / pH: x  Gluc: x / Ketone: Negative  / Bili: Negative / Urobili: Negative   Blood: x / Protein: Negative / Nitrite: Negative   Leuk Esterase: Negative / RBC: 1 /hpf / WBC 1 /hpf   Sq Epi: x / Non Sq Epi: 1 /hpf / Bacteria: Negative        RADIOLOGY & ADDITIONAL TESTS:    Imaging Personally Reviewed:    Consultant(s) Notes Reviewed:      Care Discussed with Consultants/Other Providers:

## 2018-12-01 NOTE — DISCHARGE NOTE ADULT - PATIENT PORTAL LINK FT
You can access the [facility] Patient Portal, offered by [facility], by registering with the following website: [email or web address]

## 2018-12-01 NOTE — DISCHARGE NOTE ADULT - OTHER SIGNIFICANT FINDINGS
< from: Upper Endoscopy (12.03.18 @ 07:58) >                                                                                                        Impression:          - Normal esophagus.                       - Normal stomach.              - Normal duodenal bulb and 2nd part of the duodenum.                       - No specimens collected.                       - No evidence of upper GI bleeding on this exam.  Recommendation:      - Perform a colonoscopy today.    < end of copied text >          < from: Colonoscopy (12.03.18 @ 07:56) >  Impression:          - Non-bleeding internal hemorrhoids.                       - The examination was otherwise normal.                       - No specimens collected.                       - No evidence of bleeding seen on this exam.  Recommendation:      - [facility] to [facility] for [facility] care.                       - Resume regular diet.       - Repeat colonoscopy in 10 years for screening purposes.                                                                                                          < end of copied text >

## 2018-12-01 NOTE — DISCHARGE NOTE ADULT - HOSPITAL COURSE
HPI:  38 F PMH lupus on Plaquenil, stage V nephritis, HTN, hypothyroidism coming in with rectal pain,  RUQ, and melena for 3 days. Patient describes the rectal pain as sharp, and very tender when wiping with toilet tissue. She denies bright red blood on the toilet paper when wiping. She denies constipation or diarrhea. The RUQ abdominal pain is with radiation to the right flank,  and worse with lying onto the R side. No fevers or chills.  She has had some diffuse abdominal pain as well as 2-3 episodes melena associated with her sx. Last episode of melena was prior to coming to the hospital. Otherwise denies dysuria, nausea, or vomiting. She is unable to tell whether she has hematuria as she is currently menstruating. Tylenol did not alleviate the pain. No recent NSAID use. She states she hasent eaten in one day. Of note, patient had previously been admitted in 10/2018 about for 3 weeks of severe L flank pain with pleuritic like chest pain on L side, workup with CTA, renal duplex, and XR were negative for acute pathology. MARYAM at that time presumed to be related to naproxen use to pain, which resolved on discharge.     In ED, T 97.9, HR 84, BP: 145/91, and 100% on RA. Labs notable for Hb 12.4 --> 10 on repeat (baseline 10), Cr 0.7 from 1.2 (10/2018). FOBT positive. She was given protonix 80 IV x 1, lidoderm patch to sacrum, tylenol x 2. CT pelvis unremarkable (30 Nov 2018 03:35)    Hospital Course:  Patient evaluated by GI. They will do a colonoscopy and endoscopy on Monday. Patient with no episodes of melena during admission. HPI:  38 F PMH lupus on Plaquenil, stage V nephritis, HTN, hypothyroidism coming in with rectal pain,  RUQ, and melena for 3 days. Patient describes the rectal pain as sharp, and very tender when wiping with toilet tissue. She denies bright red blood on the toilet paper when wiping. She denies constipation or diarrhea. The RUQ abdominal pain is with radiation to the right flank,  and worse with lying onto the R side. No fevers or chills.  She has had some diffuse abdominal pain as well as 2-3 episodes melena associated with her sx. Last episode of melena was prior to coming to the hospital. Otherwise denies dysuria, nausea, or vomiting. She is unable to tell whether she has hematuria as she is currently menstruating. Tylenol did not alleviate the pain. No recent NSAID use. She states she hasent eaten in one day. Of note, patient had previously been admitted in 10/2018 about for 3 weeks of severe L flank pain with pleuritic like chest pain on L side, workup with CTA, renal duplex, and XR were negative for acute pathology. MARYAM at that time presumed to be related to naproxen use to pain, which resolved on discharge.     In ED, T 97.9, HR 84, BP: 145/91, and 100% on RA. Labs notable for Hb 12.4 --> 10 on repeat (baseline 10), Cr 0.7 from 1.2 (10/2018). FOBT positive. She was given protonix 80 IV x 1, lidoderm patch to sacrum, tylenol x 2. CT pelvis unremarkable (30 Nov 2018 03:35)    Hospital Course:  Patient evaluated by GI. They will do a colonoscopy and endoscopy on Monday. Patient with no episodes of melena during admission. Patient underwent EGD and colonoscopy which did not show any stigmata of bleeding, and showed non bleeding internal hemorrhoids. Her hemoglobin was stable at her baseline and did not require any blood transfusions. Patient is stable for discharge home and should repeat colonoscopy in 10 years.

## 2018-12-01 NOTE — CHART NOTE - NSCHARTNOTEFT_GEN_A_CORE
Plan for EGD and colonoscopy on Monday    -please keep on clears tomorrow  - please keep NPO after midnight on Sunday night  - we will order prep

## 2018-12-01 NOTE — DISCHARGE NOTE ADULT - CARE PLAN
Principal Discharge DX:	Melena  Goal:	Repeat colonoscopy in 10 years.  Assessment and plan of treatment:	You came to the hospital because you had several days of rectal pain, abdominal pain and noticed blood in your stool. Our imaging of your intestinal tract did not reveal a source of the bleed.  If you have recurrent symptoms blood in your stool tell your doctor or return to the ED.  You should also get a repeat colonoscopy in 10 years. Principal Discharge DX:	Melena  Goal:	Repeat colonoscopy in 10 years.  Assessment and plan of treatment:	You came to the hospital because you had several days of rectal pain, abdominal pain and noticed blood in your stool. Our imaging of your intestinal tract did not reveal a source of the bleed.  If you have recurrent symptoms blood in your stool tell your doctor or return to the ED.  You should also get a repeat colonoscopy in 10 years.  Secondary Diagnosis:	Hypothyroidism  Assessment and plan of treatment:	Continue to take your levothyroxine as prescribed.  Secondary Diagnosis:	Lupus (systemic lupus erythematosus)  Assessment and plan of treatment:	Continue to take your plaquenil as prescribed. Please follow up with your rheumatologist within 1 week of discharge.

## 2018-12-01 NOTE — PROGRESS NOTE ADULT - PROBLEM SELECTOR PLAN 1
Patient with new onset melena x 3 days associated with RUQ pain. Low suspicion for hepatobiliary process given no fevers, leukocytosis and normal LFTs. Possible gastritis vs PUD. Denies recent NSAID use. UA negative for infection  - CT pelvis without overt pathology  - Hb at baseline of 10s, and HD stable. No further episodes since admission  - c/w protonix gtt  - trend CBC q8  - GI recs appreciated, EGD/Colonoscopy on Monday

## 2018-12-01 NOTE — DISCHARGE NOTE ADULT - PLAN OF CARE
Repeat colonoscopy in 10 years. You came to the hospital because you had several days of rectal pain, abdominal pain and noticed blood in your stool. Our imaging of your intestinal tract did not reveal a source of the bleed.  If you have recurrent symptoms blood in your stool tell your doctor or return to the ED.  You should also get a repeat colonoscopy in 10 years. Continue to take your levothyroxine as prescribed. Continue to take your plaquenil as prescribed. Please follow up with your rheumatologist within 1 week of discharge.

## 2018-12-01 NOTE — DISCHARGE NOTE ADULT - ADDITIONAL INSTRUCTIONS
1. Please follow up with your rheumatologist within 1 week of discharge.   2. Please make an appointment with a primary care doctor.

## 2018-12-01 NOTE — DISCHARGE NOTE ADULT - MEDICATION SUMMARY - MEDICATIONS TO TAKE
I will START or STAY ON the medications listed below when I get home from the hospital:    enalapril 2.5 mg oral tablet  -- 1 tab(s) by mouth once a day  -- Indication: For Hypertension    ondansetron 4 mg oral tablet  -- 1 tab(s) by mouth , As Needed  -- Indication: For Nausea    Plaquenil 200 mg oral tablet  -- 1 tab(s) by mouth once a day, 2 tabs the next day, alternating   -- Indication: For sle    mometasone 0.1% topical cream  -- Apply on skin to affected area once a day, itching  -- Indication: For Rash    levothyroxine 50 mcg (0.05 mg) oral tablet  -- 1 tab(s) by mouth once a day  -- Indication: For Hypothyroidism

## 2018-12-01 NOTE — PROGRESS NOTE ADULT - PROBLEM SELECTOR PLAN 2
Patient reporting severe perineal and rectal pain. No overt finding on exam except for small internal hemorrhoids. No overt hematochezia. CT scan without overt findings but possibly too small to appreciate  - topical lidocaine  - tylenol prn   - avoid NSAID   - eventual luminal evaluation by GI Patient reporting severe perineal and rectal pain. No overt finding on exam except for small internal hemorrhoids. No overt hematochezia. CT scan without overt findings but possibly too small to appreciate  - topical lidocaine  - tylenol prn   - avoid NSAID   - patient constipated, likely due to hemorrhoids, will give miralax once

## 2018-12-01 NOTE — DISCHARGE NOTE ADULT - CARE PROVIDER_API CALL
Rodri Olmedo), Internal Medicine; Rheumatology  [address] [place] [address]  Phone: [phone]  Fax: [phone] Rodri Olmedo), Internal Medicine; Rheumatology  [address] [place] [address]  Phone: [phone]  Fax: [phone]

## 2018-12-02 LAB
ALBUMIN SERPL ELPH-MCNC: 3.6 G/DL — SIGNIFICANT CHANGE UP (ref 3.3–5)
ALP SERPL-CCNC: 51 U/L — SIGNIFICANT CHANGE UP (ref 40–120)
ALT FLD-CCNC: 8 U/L — LOW (ref 10–45)
ANION GAP SERPL CALC-SCNC: 15 MMOL/L — SIGNIFICANT CHANGE UP (ref 5–17)
AST SERPL-CCNC: 11 U/L — SIGNIFICANT CHANGE UP (ref 10–40)
BILIRUB SERPL-MCNC: 0.1 MG/DL — LOW (ref 0.2–1.2)
BLD GP AB SCN SERPL QL: NEGATIVE — SIGNIFICANT CHANGE UP
BUN SERPL-MCNC: 10 MG/DL — SIGNIFICANT CHANGE UP (ref 7–23)
CALCIUM SERPL-MCNC: 8.7 MG/DL — SIGNIFICANT CHANGE UP (ref 8.4–10.5)
CHLORIDE SERPL-SCNC: 104 MMOL/L — SIGNIFICANT CHANGE UP (ref 96–108)
CO2 SERPL-SCNC: 21 MMOL/L — LOW (ref 22–31)
CREAT SERPL-MCNC: 0.77 MG/DL — SIGNIFICANT CHANGE UP (ref 0.5–1.3)
GLUCOSE SERPL-MCNC: 94 MG/DL — SIGNIFICANT CHANGE UP (ref 70–99)
HCT VFR BLD CALC: 28.4 % — LOW (ref 34.5–45)
HCT VFR BLD CALC: 35.7 % — SIGNIFICANT CHANGE UP (ref 34.5–45)
HGB BLD-MCNC: 11.6 G/DL — SIGNIFICANT CHANGE UP (ref 11.5–15.5)
HGB BLD-MCNC: 9.4 G/DL — LOW (ref 11.5–15.5)
MCHC RBC-ENTMCNC: 25.9 PG — LOW (ref 27–34)
MCHC RBC-ENTMCNC: 26.7 PG — LOW (ref 27–34)
MCHC RBC-ENTMCNC: 32.4 GM/DL — SIGNIFICANT CHANGE UP (ref 32–36)
MCHC RBC-ENTMCNC: 33.1 GM/DL — SIGNIFICANT CHANGE UP (ref 32–36)
MCV RBC AUTO: 79.9 FL — LOW (ref 80–100)
MCV RBC AUTO: 80.7 FL — SIGNIFICANT CHANGE UP (ref 80–100)
PLATELET # BLD AUTO: 222 K/UL — SIGNIFICANT CHANGE UP (ref 150–400)
PLATELET # BLD AUTO: 310 K/UL — SIGNIFICANT CHANGE UP (ref 150–400)
POTASSIUM SERPL-MCNC: 3.7 MMOL/L — SIGNIFICANT CHANGE UP (ref 3.5–5.3)
POTASSIUM SERPL-SCNC: 3.7 MMOL/L — SIGNIFICANT CHANGE UP (ref 3.5–5.3)
PROT SERPL-MCNC: 6.5 G/DL — SIGNIFICANT CHANGE UP (ref 6–8.3)
RBC # BLD: 3.52 M/UL — LOW (ref 3.8–5.2)
RBC # BLD: 4.47 M/UL — SIGNIFICANT CHANGE UP (ref 3.8–5.2)
RBC # FLD: 12.1 % — SIGNIFICANT CHANGE UP (ref 10.3–14.5)
RBC # FLD: 13.5 % — SIGNIFICANT CHANGE UP (ref 10.3–14.5)
RH IG SCN BLD-IMP: POSITIVE — SIGNIFICANT CHANGE UP
SODIUM SERPL-SCNC: 140 MMOL/L — SIGNIFICANT CHANGE UP (ref 135–145)
WBC # BLD: 7.51 K/UL — SIGNIFICANT CHANGE UP (ref 3.8–10.5)
WBC # BLD: 8.2 K/UL — SIGNIFICANT CHANGE UP (ref 3.8–10.5)
WBC # FLD AUTO: 7.51 K/UL — SIGNIFICANT CHANGE UP (ref 3.8–10.5)
WBC # FLD AUTO: 8.2 K/UL — SIGNIFICANT CHANGE UP (ref 3.8–10.5)

## 2018-12-02 PROCEDURE — 99233 SBSQ HOSP IP/OBS HIGH 50: CPT | Mod: GC

## 2018-12-02 RX ORDER — PANTOPRAZOLE SODIUM 20 MG/1
40 TABLET, DELAYED RELEASE ORAL EVERY 12 HOURS
Qty: 0 | Refills: 0 | Status: DISCONTINUED | OUTPATIENT
Start: 2018-12-02 | End: 2018-12-04

## 2018-12-02 RX ADMIN — PANTOPRAZOLE SODIUM 40 MILLIGRAM(S): 20 TABLET, DELAYED RELEASE ORAL at 22:11

## 2018-12-02 RX ADMIN — Medication 25 MILLIGRAM(S): at 00:08

## 2018-12-02 RX ADMIN — Medication 1000 MILLILITER(S): at 22:00

## 2018-12-02 RX ADMIN — Medication 1 APPLICATION(S): at 05:27

## 2018-12-02 RX ADMIN — Medication 1 APPLICATION(S): at 18:39

## 2018-12-02 RX ADMIN — Medication 1000 MILLILITER(S): at 18:39

## 2018-12-02 RX ADMIN — PANTOPRAZOLE SODIUM 10 MG/HR: 20 TABLET, DELAYED RELEASE ORAL at 05:27

## 2018-12-02 RX ADMIN — LIDOCAINE 1 APPLICATION(S): 4 CREAM TOPICAL at 12:22

## 2018-12-02 RX ADMIN — Medication 50 MICROGRAM(S): at 05:26

## 2018-12-02 RX ADMIN — Medication 3 MILLIGRAM(S): at 00:08

## 2018-12-02 RX ADMIN — Medication 200 MILLIGRAM(S): at 12:22

## 2018-12-02 NOTE — PROGRESS NOTE ADULT - PROBLEM SELECTOR PLAN 6
DVT: ambulate as toelrated  Diet: CLD, NPO after midnight   Dispo: pending DVT: ambulate as tolerated  Diet: CLD, NPO after midnight   Dispo: pending

## 2018-12-02 NOTE — PROGRESS NOTE ADULT - ASSESSMENT
38 F PMH lupus on Plaquenil, stage V nephritis, HTN, and hypothyroidism coming in 3 days of rectal pain and new onset RUQ abdominal pain and melena, concerning for possible upper GI bleed and hemorrhoidal pain, plan for EGD/colonoscopy tmrw

## 2018-12-02 NOTE — PROGRESS NOTE ADULT - PROBLEM SELECTOR PLAN 1
Patient with new onset melena x 3 days associated with RUQ pain. Low suspicion for hepatobiliary process given no fevers, leukocytosis and normal LFTs. Possible gastritis vs PUD. Denies recent NSAID use. UA negative for infection  - CT pelvis without overt pathology  - Hb at baseline of 10s, and HD stable. No further episodes since admission  - c/w protonix gtt per GI recs   - trend CBC q12, however CBC has been stable for days, can likely do daily after procedure tmrw   -CLD today, NPO after midnight for EGD/colonosocpy Patient with new onset melena x 3 days PTA associated with RUQ pain. Low suspicion for hepatobiliary process given no fevers, leukocytosis and normal LFTs. Possible gastritis vs PUD. Denies recent NSAID use. UA negative for infection  - CT pelvis without overt pathology  - Hb at baseline of 10s, and HD stable.   - No further episodes of melena since admission  - Protonix 40 BID  - trend CBC q12, however CBC has been stable for days, can likely do daily after procedure tmrw   -CLD today, NPO after midnight for EGD/colonosocpy

## 2018-12-02 NOTE — PROVIDER CONTACT NOTE (OTHER) - BACKGROUND
pt admitted with gi bleed. iv removed 1/1/18 due to site symptomatic. pt c/o pain at former iv site and r side of back, itchiness all over not resolved by benadryl 25 mg @20:08, and not being tired

## 2018-12-02 NOTE — PROGRESS NOTE ADULT - SUBJECTIVE AND OBJECTIVE BOX
Patient is a 38y old  Female who presents with a chief complaint of rectal pain, RUQ pain, melena (02 Dec 2018 08:07)      SUBJECTIVE / OVERNIGHT EVENTS: No overnight events. Patient seen and examined this AM. Rectal pain improved with topical agents. Last BM yesterday, brown, soft. No complaints this AM. Patient denies CP, SOB.    MEDICATIONS  (STANDING):  AQUAPHOR (petrolatum Ointment) 1 Application(s) Topical two times a day  hydroxychloroquine 200 milliGRAM(s) Oral daily  levothyroxine 50 MICROGram(s) Oral daily  pantoprazole Infusion 8 mG/Hr (10 mL/Hr) IV Continuous <Continuous>  polyethylene glycol/electrolyte Solution 1000 milliLiter(s) Oral every 4 hours    MEDICATIONS  (PRN):  diphenhydrAMINE 25 milliGRAM(s) Oral every 4 hours PRN Rash and/or Itching  hydrocortisone hemorrhoidal Suppository 1 Suppository(s) Rectal two times a day PRN rectal pain  lidocaine 2% Gel 1 Application(s) Topical every 4 hours PRN rectal pain      T(C): 36.8 (12-02-18 @ 05:23), Max: 37.1 (12-01-18 @ 19:41)  HR: 72 (12-02-18 @ 05:23) (67 - 81)  BP: 101/53 (12-02-18 @ 05:23) (100/58 - 123/81)  RR: 16 (12-02-18 @ 05:23) (16 - 18)  SpO2: 100% (12-02-18 @ 05:23) (99% - 100%)    PHYSICAL EXAM  GENERAL: NAD, well-developed, middle aged female   NEURO: AO x3  EYES: conjunctiva and sclera clear  NECK: Supple, No JVD  CHEST/LUNG: Clear to auscultation bilaterally; No wheezes, rales or rhonchi  HEART: Regular rate and rhythm; No murmurs, rubs, or gallops  ABDOMEN: Soft, Nontender, Nondistended; Bowel sounds present, no masses, external  exam no evidence of lesions or rash, minimal TTP on external rectal area  EXTREMITIES:  2+ Peripheral Pulses, No clubbing, cyanosis, or edema  SKIN: Warm, dry, in tact, no rashes or lesions  PSYCH: flat    LABS:                        10.7   6.69  )-----------( 263      ( 01 Dec 2018 08:42 )             31.6     12-02    140  |  104  |  10  ----------------------------<  94  3.7   |  21<L>  |  0.77    Ca    8.7      02 Dec 2018 06:23    TPro  6.5  /  Alb  3.6  /  TBili  0.1<L>  /  DBili  x   /  AST  11  /  ALT  8<L>  /  AlkPhos  51  12-02            I&O's Summary    01 Dec 2018 07:01  -  02 Dec 2018 07:00  --------------------------------------------------------  IN: 1390 mL / OUT: 0 mL / NET: 1390 mL        Doris Simms MD  Internal Medicine Resident, PGY1  Pager: [phone] / [phone] Patient is a 38y old  Female who presents with a chief complaint of rectal pain, RUQ pain, melena (02 Dec 2018 08:07)    SUBJECTIVE / OVERNIGHT EVENTS: No overnight events. Patient seen and examined this AM. Rectal pain improved with topical agents. Last BM yesterday, brown, soft. No complaints this AM. Patient denies CP, SOB.    HR: 72 (12-02-18 @ 05:23) (67 - 81)  BP: 101/53 (12-02-18 @ 05:23) (100/58 - 123/81)  RR: 16 (12-02-18 @ 05:23) (16 - 18)  SpO2: 100% (12-02-18 @ 05:23) (99% - 100%)    PHYSICAL EXAM  GENERAL: NAD, well-developed, middle aged female   NEURO: AO x3  EYES: conjunctiva and sclera clear  NECK: Supple, No JVD  CHEST/LUNG: Clear to auscultation bilaterally; No wheezes, rales or rhonchi  HEART: Regular rate and rhythm; No murmurs, rubs, or gallops  ABDOMEN: Soft, Nontender, Nondistended; Bowel sounds present, no masses, external  exam no evidence of lesions or rash, minimal TTP on external rectal area  EXTREMITIES:  2+ Peripheral Pulses, No clubbing, cyanosis, or edema  SKIN: Warm, dry, in tact, no rashes or lesions  PSYCH: flat    I&O's Summary  01 Dec 2018 07:01  -  02 Dec 2018 07:00  --------------------------------------------------------  IN: 1390 mL / OUT: 0 mL / NET: 1390 mL    LABS:                        9.4    7.51  )-----------( 222      ( 02 Dec 2018 09:15 )             28.4   12-02    140  |  104  |  10  ----------------------------<  94  3.7   |  21<L>  |  0.77    Ca    8.7      02 Dec 2018 06:23    TPro  6.5  /  Alb  3.6  /  TBili  0.1<L>  /  DBili  x   /  AST  11  /  ALT  8<L>  /  AlkPhos  51  12-02    MEDICATIONS  (STANDING):  AQUAPHOR (petrolatum Ointment) 1 Application(s) Topical two times a day  hydroxychloroquine 200 milliGRAM(s) Oral daily  levothyroxine 50 MICROGram(s) Oral daily  pantoprazole  Injectable 40 milliGRAM(s) IV Push every 12 hours  polyethylene glycol/electrolyte Solution 1000 milliLiter(s) Oral every 4 hours    MEDICATIONS  (PRN):  diphenhydrAMINE 25 milliGRAM(s) Oral every 4 hours PRN Rash and/or Itching  hydrocortisone hemorrhoidal Suppository 1 Suppository(s) Rectal two times a day PRN rectal pain  lidocaine 2% Gel 1 Application(s) Topical every 4 hours PRN rectal pain    Doris Simms MD  Internal Medicine Resident, PGY1  Pager: [phone] / [phone]

## 2018-12-02 NOTE — PROVIDER CONTACT NOTE (OTHER) - ACTION/TREATMENT ORDERED:
md aware. will come see pt. after seeing pt, md prescribed melatonin and provider to RN to give benadryl early. meds given. will continue to monitor

## 2018-12-02 NOTE — PROGRESS NOTE ADULT - PROBLEM SELECTOR PLAN 2
Patient reporting severe perineal and rectal pain. No overt finding on exam except for small internal hemorrhoids. No overt hematochezia. CT scan without overt findings but possibly too small to appreciate  - topical lidocaine, and gel, pain improving, no evidence of rash or vesicles   - tylenol prn   - avoid NSAID

## 2018-12-02 NOTE — PROVIDER CONTACT NOTE (OTHER) - BACKGROUND
pt admitted 11/29 gi bleed pmh lupus. protonix gtt at 10 ml/h.  pt needs new iv, tried to get new iv x3 but failed. currently holding protonix gtt and waiting for iv nurse

## 2018-12-02 NOTE — PROVIDER CONTACT NOTE (OTHER) - ACTION/TREATMENT ORDERED:
md notified. aware that protonix gtt is on hold until pt has new Iv inserted. as per md, will continue protonix gtt and reevaluate need for drip in am. will continue to monitor

## 2018-12-03 LAB
ANION GAP SERPL CALC-SCNC: 11 MMOL/L — SIGNIFICANT CHANGE UP (ref 5–17)
BUN SERPL-MCNC: 6 MG/DL — LOW (ref 7–23)
CALCIUM SERPL-MCNC: 9.5 MG/DL — SIGNIFICANT CHANGE UP (ref 8.4–10.5)
CHLORIDE SERPL-SCNC: 109 MMOL/L — HIGH (ref 96–108)
CO2 SERPL-SCNC: 24 MMOL/L — SIGNIFICANT CHANGE UP (ref 22–31)
CREAT SERPL-MCNC: 0.79 MG/DL — SIGNIFICANT CHANGE UP (ref 0.5–1.3)
GLUCOSE SERPL-MCNC: 103 MG/DL — HIGH (ref 70–99)
HCT VFR BLD CALC: 32.8 % — LOW (ref 34.5–45)
HGB BLD-MCNC: 10.6 G/DL — LOW (ref 11.5–15.5)
MCHC RBC-ENTMCNC: 26.6 PG — LOW (ref 27–34)
MCHC RBC-ENTMCNC: 32.3 GM/DL — SIGNIFICANT CHANGE UP (ref 32–36)
MCV RBC AUTO: 82.4 FL — SIGNIFICANT CHANGE UP (ref 80–100)
PLATELET # BLD AUTO: 287 K/UL — SIGNIFICANT CHANGE UP (ref 150–400)
POTASSIUM SERPL-MCNC: 3.8 MMOL/L — SIGNIFICANT CHANGE UP (ref 3.5–5.3)
POTASSIUM SERPL-SCNC: 3.8 MMOL/L — SIGNIFICANT CHANGE UP (ref 3.5–5.3)
RBC # BLD: 3.98 M/UL — SIGNIFICANT CHANGE UP (ref 3.8–5.2)
RBC # FLD: 13.2 % — SIGNIFICANT CHANGE UP (ref 10.3–14.5)
SODIUM SERPL-SCNC: 144 MMOL/L — SIGNIFICANT CHANGE UP (ref 135–145)
WBC # BLD: 6.83 K/UL — SIGNIFICANT CHANGE UP (ref 3.8–10.5)
WBC # FLD AUTO: 6.83 K/UL — SIGNIFICANT CHANGE UP (ref 3.8–10.5)

## 2018-12-03 PROCEDURE — 45378 DIAGNOSTIC COLONOSCOPY: CPT | Mod: GC

## 2018-12-03 PROCEDURE — 44360 SMALL BOWEL ENDOSCOPY: CPT | Mod: GC

## 2018-12-03 PROCEDURE — 99233 SBSQ HOSP IP/OBS HIGH 50: CPT | Mod: GC

## 2018-12-03 RX ADMIN — Medication 50 MICROGRAM(S): at 05:13

## 2018-12-03 RX ADMIN — Medication 25 MILLIGRAM(S): at 21:47

## 2018-12-03 RX ADMIN — Medication 200 MILLIGRAM(S): at 13:07

## 2018-12-03 RX ADMIN — Medication 1 APPLICATION(S): at 18:16

## 2018-12-03 RX ADMIN — PANTOPRAZOLE SODIUM 40 MILLIGRAM(S): 20 TABLET, DELAYED RELEASE ORAL at 18:16

## 2018-12-03 RX ADMIN — PANTOPRAZOLE SODIUM 40 MILLIGRAM(S): 20 TABLET, DELAYED RELEASE ORAL at 05:13

## 2018-12-03 RX ADMIN — Medication 1 APPLICATION(S): at 05:13

## 2018-12-03 NOTE — PROGRESS NOTE ADULT - PROBLEM SELECTOR PLAN 6
DVT: ambulate as tolerated  Diet: CLD, NPO after midnight   Dispo: pending DVT: ambulate as tolerated  Diet: Will advance diet after EGD  Dispo: pending

## 2018-12-03 NOTE — CHART NOTE - NSCHARTNOTEFT_GEN_A_CORE
Preliminary upper endoscopy and colonoscopy report. (Please see full report in Results section of [facility] pending attending attestation).    Upper endoscopy  Findings:   Normal exam  No active bleeding or recent stigmata of bleeding    Colonoscopy:  Findings:  Non-bleeding internal hemorrhoids  Exam otherwise normal  No active bleeding or recent stigmata of bleeding    Recommendations:  - Regular diet  - Repeat colonoscopy in 10 years for colorectal cancer screening    Doron Andrews MD  Gastroenterology Fellow  Pager number: [phone] / [phone]

## 2018-12-03 NOTE — PROGRESS NOTE ADULT - ASSESSMENT
38 F PMH lupus on Plaquenil, stage V nephritis, HTN, and hypothyroidism coming in 3 days of rectal pain and new onset RUQ abdominal pain and melena, concerning for possible upper GI bleed and hemorrhoidal pain, plan for EGD/colonoscopy tmrw 38 F PMH lupus on Plaquenil, stage V nephritis, HTN, and hypothyroidism coming in 3 days of rectal pain and new onset RUQ abdominal pain and melena, concerning for possible upper GI bleed and hemorrhoidal pain, plan for EGD/colonoscopy today 38yoF w/ PMHx significant for SLE on Plaquenil c/b stage V nephritis, HTN, and hypothyroidism coming in with 3 days of rectal pain and new onset RUQ abdominal pain and melena, concerning for possible upper GI bleed and hemorrhoidal pain, plan for EGD/colonoscopy today.

## 2018-12-03 NOTE — PROGRESS NOTE ADULT - PROBLEM SELECTOR PLAN 2
Patient reporting severe perineal and rectal pain. No overt finding on exam except for small internal hemorrhoids. No overt hematochezia. CT scan without overt findings but possibly too small to appreciate  - topical lidocaine, and gel, pain improving, no evidence of rash or vesicles   - tylenol prn   - avoid NSAID Patient reporting severe perineal and rectal pain. No overt finding on exam except for small internal hemorrhoids. No overt hematochezia. CT scan without overt findings but possibly too small to appreciate  - topical lidocaine, and gel, pain improving, no evidence of rash or vesicles   - tylenol prn   - avoid NSAIDs

## 2018-12-03 NOTE — PRE-OP CHECKLIST - NOTHING BY MOUTH SINCE
Anesthesia Volume In Cc (Will Not Render If 0): 1 Lab Facility: [id number] Bill For Surgical Tray: no Silver Nitrate Text: The wound bed was treated with silver nitrate after the biopsy was performed. Biopsy Type: H and E 02-Dec-2018 23:59 Wound Care: Vaseline Cryotherapy Text: The wound bed was treated with cryotherapy after the biopsy was performed. Electrodesiccation And Curettage Text: The wound bed was treated with electrodesiccation and curettage after the biopsy was performed. Hemostasis: Drysol Type Of Destruction Used: Curettage Lab: [id number] Post-Care Instructions: I reviewed with the patient in detail post-care instructions. Patient is to keep the biopsy site dry overnight, and then apply bacitracin twice daily until healed. Patient may apply hydrogen peroxide soaks to remove any crusting. Additional Anesthesia Volume In Cc (Will Not Render If 0): 0 Anesthesia Type: 1% lidocaine without epinephrine Billing Type: Third-Party Bill Detail Level: Detailed Dressing: pressure dressing with telfa Curettage Text: The wound bed was treated with curettage after the biopsy was performed. Notification Instructions: Patient will be notified of biopsy results. However, patient instructed to call the office if not contacted within 2 weeks. Electrodesiccation Text: The wound bed was treated with electrodesiccation after the biopsy was performed. Consent: Verbal consent was obtained and risks were reviewed including but not limited to scarring, infection, bleeding, scabbing, incomplete removal, nerve damage and allergy to anesthesia. Biopsy Method: Dermablade

## 2018-12-03 NOTE — PROGRESS NOTE ADULT - PROBLEM SELECTOR PLAN 4
BP stable  - hold home enalapril 2.5 mg qd for now BP stable  - holding home enalapril 2.5 mg qd for now

## 2018-12-03 NOTE — PROGRESS NOTE ADULT - SUBJECTIVE AND OBJECTIVE BOX
Pre-Endoscopy Evaluation      Referring Physician: dr. barragan                                 Procedure:  upper gastrointestinal endoscopy/colonoscopy    Indication for Procedure: gib, abdominal pain    Pertinent History: 38y female with PMH of HTN, hypothyroidism. SLE on Plaquenil c/b lupus nephritis, presenting with rectal pain, with report of RUQ abdominal pain / right sided flank pain, and episode of black tarry stools     Sedation by Anesthesia [X]    PAST MEDICAL & SURGICAL HISTORY:  Lupus (systemic lupus erythematosus)  Hypothyroidism  No significant past surgical history      PMH of Gastroparesis [ ]  Gastric Surgery [ ]  Gastric Outlet Obstruction [ ]    Allergies    penicillin (Hives)  penicillin (Hives; Rash)    Intolerances:    Latex allergy: [ ] yes [X] no    Medications:MEDICATIONS  (STANDING):  AQUAPHOR (petrolatum Ointment) 1 Application(s) Topical two times a day  hydroxychloroquine 200 milliGRAM(s) Oral daily  levothyroxine 50 MICROGram(s) Oral daily  pantoprazole  Injectable 40 milliGRAM(s) IV Push every 12 hours    MEDICATIONS  (PRN):  diphenhydrAMINE 25 milliGRAM(s) Oral every 4 hours PRN Rash and/or Itching  hydrocortisone hemorrhoidal Suppository 1 Suppository(s) Rectal two times a day PRN rectal pain  lidocaine 2% Gel 1 Application(s) Topical every 4 hours PRN rectal pain      Smoking: [ ] yes  [X] no    AICD/PPM: [ ] yes   [X] no    Pertinent lab data:                        11.6   8.2   )-----------( 310      ( 02 Dec 2018 22:26 )             35.7     12-03    144  |  109<H>  |  6<L>  ----------------------------<  103<H>  3.8   |  24  |  0.79    Ca    9.5      03 Dec 2018 07:04    TPro  6.5  /  Alb  3.6  /  TBili  0.1<L>  /  DBili  x   /  AST  11  /  ALT  8<L>  /  AlkPhos  51  12-02        Physical Examination:  Daily Height in cm: 154.94 (03 Dec 2018 04:07)    Daily   Vital Signs Last 24 Hrs  T(C): 36.7 (03 Dec 2018 05:11), Max: 37 (02 Dec 2018 20:33)  T(F): 98.1 (03 Dec 2018 05:11), Max: 98.6 (02 Dec 2018 20:33)  HR: 76 (03 Dec 2018 05:11) (73 - 82)  BP: 110/71 (03 Dec 2018 05:11) (110/71 - 129/80)  BP(mean): --  RR: 18 (03 Dec 2018 05:11) (18 - 18)  SpO2: 99% (03 Dec 2018 05:11) (97% - 100%)    Drug Dosing Weight  Height (cm): 154.94 (03 Dec 2018 04:07)  Weight (kg): 56.7 (03 Dec 2018 04:07)  BMI (kg/m2): 23.6 (03 Dec 2018 04:07)  BSA (m2): 1.55 (03 Dec 2018 04:07)    Constitutional: NAD     Neck:  No JVD    Respiratory: CTAB/L    Cardiovascular: S1 and S2    Gastrointestinal: BS+, soft, NT/ND    Extremities: No peripheral edema    Neurological: A/O x 3    : No Sewell    Skin: No rashes    Comments:    ASA Class: I [ ]  II [x]  III [ ]  IV [ ]    The patient is a suitable candidate for the planned procedure unless box checked [ ]  No, explain:

## 2018-12-03 NOTE — PROGRESS NOTE ADULT - PROBLEM SELECTOR PLAN 1
Patient with new onset melena x 3 days PTA associated with RUQ pain. Low suspicion for hepatobiliary process given no fevers, leukocytosis and normal LFTs. Possible gastritis vs PUD. Denies recent NSAID use. UA negative for infection  - CT pelvis without overt pathology  - Hb at baseline of 10s, and HD stable.   - No further episodes of melena since admission  - Protonix 40 BID  - trend CBC q12, however CBC has been stable for days, can likely do daily after procedure tmrw   -CLD today, NPO after midnight for EGD/colonosocpy Patient with new onset melena x 3 days PTA associated with RUQ pain. Low suspicion for hepatobiliary process given no fevers, leukocytosis and normal LFTs. Possible gastritis vs PUD. Denies recent NSAID use. UA negative for infection  - CT pelvis without overt pathology  - Hb at baseline of 10s, and HD stable.   - No further episodes of melena since admission  - Protonix 40 BID  - trend CBC q12, however CBC has been stable for days, can likely do daily after procedure tmrw   -Will advance diet after EGD/colonosocpy today Patient with new onset melena x 3 days PTA associated with RUQ pain. Low suspicion for hepatobiliary process given no fevers, leukocytosis and normal LFTs. Possible gastritis vs PUD. Denies recent NSAID use. UA negative for infection  - CT pelvis without overt pathology  - Hb at baseline of 10s, and HD stable.   - No further episodes of melena since admission  - Protonix 40 BID IV --> PO post procedure  - trend CBC q12, however CBC has been stable for days, can likely do daily after procedure today  - will advance diet after EGD/colonosocpy today

## 2018-12-03 NOTE — PROGRESS NOTE ADULT - SUBJECTIVE AND OBJECTIVE BOX
***************************************************************  Sarwat Suarezlerman, PGY1  Internal Medicine   pager: [phone]  ***************************************************************    JESSY AVILAR  38y  MRN: [id number]    Patient is a 38y old  Female who presents with a chief complaint of rectal pain, RUQ pain, melena (02 Dec 2018 08:07)      Subjective: no events ON. Denies fever, CP, SOB, abn pain, N/V, dysuria. Tolerating diet.      MEDICATIONS  (STANDING):  AQUAPHOR (petrolatum Ointment) 1 Application(s) Topical two times a day  hydroxychloroquine 200 milliGRAM(s) Oral daily  levothyroxine 50 MICROGram(s) Oral daily  pantoprazole  Injectable 40 milliGRAM(s) IV Push every 12 hours    MEDICATIONS  (PRN):  diphenhydrAMINE 25 milliGRAM(s) Oral every 4 hours PRN Rash and/or Itching  hydrocortisone hemorrhoidal Suppository 1 Suppository(s) Rectal two times a day PRN rectal pain  lidocaine 2% Gel 1 Application(s) Topical every 4 hours PRN rectal pain      Objective:    Vitals: Vital Signs Last 24 Hrs  T(C): 36.7 (12-03-18 @ 05:11), Max: 37 (12-02-18 @ 20:33)  T(F): 98.1 (12-03-18 @ 05:11), Max: 98.6 (12-02-18 @ 20:33)  HR: 76 (12-03-18 @ 05:11) (73 - 82)  BP: 110/71 (12-03-18 @ 05:11) (110/71 - 129/80)  BP(mean): --  RR: 18 (12-03-18 @ 05:11) (18 - 18)  SpO2: 99% (12-03-18 @ 05:11) (97% - 100%)            I&O's Summary    02 Dec 2018 07:01  -  03 Dec 2018 07:00  --------------------------------------------------------  IN: 1280 mL / OUT: 0 mL / NET: 1280 mL        PHYSICAL EXAM:  GENERAL: NAD  HEAD:  Atraumatic, Normocephalic  EYES: EOMI, conjunctiva and sclera clear  CHEST/LUNG: Clear to percussion bilaterally; No rales, rhonchi, wheezing, or rubs  HEART: Regular rate and rhythm; No murmurs, rubs, or gallops  ABDOMEN: Soft, Nontender, Nondistended;   SKIN: No rashes or lesions  NERVOUS SYSTEM:  Alert & Oriented X3, no focal deficit    LABS:  12-02    140  |  104  |  10  ----------------------------<  94  3.7   |  21<L>  |  0.77  12-01    140  |  105  |  10  ----------------------------<  116<H>  3.8   |  22  |  0.77    Ca    8.7      02 Dec 2018 06:23  Ca    9.0      01 Dec 2018 07:09    TPro  6.5  /  Alb  3.6  /  TBili  0.1<L>  /  DBili  x   /  AST  11  /  ALT  8<L>  /  AlkPhos  51  12-02  TPro  7.6  /  Alb  4.2  /  TBili  0.2  /  DBili  x   /  AST  13  /  ALT  11  /  AlkPhos  59  12-01                                              11.6   8.2   )-----------( 310      ( 02 Dec 2018 22:26 )             35.7                         9.4    7.51  )-----------( 222      ( 02 Dec 2018 09:15 )             28.4                         10.7   6.69  )-----------( 263      ( 01 Dec 2018 08:42 )             31.6 ***************************************************************  Sarwat Suarezlerman, PGY1  Internal Medicine   pager: [phone]  ***************************************************************    JESSY AVILAR  38y  MRN: [id number]    Patient is a 38y old  Female who presents with a chief complaint of rectal pain, RUQ pain, melena (02 Dec 2018 08:07)      Subjective: no events ON. Denies fever, CP, SOB, abn pain, N/V, dysuria. Tolerating diet.      MEDICATIONS  (STANDING):  AQUAPHOR (petrolatum Ointment) 1 Application(s) Topical two times a day  hydroxychloroquine 200 milliGRAM(s) Oral daily  levothyroxine 50 MICROGram(s) Oral daily  pantoprazole  Injectable 40 milliGRAM(s) IV Push every 12 hours    MEDICATIONS  (PRN):  diphenhydrAMINE 25 milliGRAM(s) Oral every 4 hours PRN Rash and/or Itching  hydrocortisone hemorrhoidal Suppository 1 Suppository(s) Rectal two times a day PRN rectal pain  lidocaine 2% Gel 1 Application(s) Topical every 4 hours PRN rectal pain      Objective:    Vitals: Vital Signs Last 24 Hrs  T(C): 36.7 (12-03-18 @ 05:11), Max: 37 (12-02-18 @ 20:33)  T(F): 98.1 (12-03-18 @ 05:11), Max: 98.6 (12-02-18 @ 20:33)  HR: 76 (12-03-18 @ 05:11) (73 - 82)  BP: 110/71 (12-03-18 @ 05:11) (110/71 - 129/80)  BP(mean): --  RR: 18 (12-03-18 @ 05:11) (18 - 18)  SpO2: 99% (12-03-18 @ 05:11) (97% - 100%)            I&O's Summary    02 Dec 2018 07:01  -  03 Dec 2018 07:00  --------------------------------------------------------  IN: 1280 mL / OUT: 0 mL / NET: 1280 mL        PHYSICAL EXAM:  GENERAL: NAD, well-developed, middle aged female   NEURO: AO x3  EYES: conjunctiva and sclera clear  NECK: Supple, No JVD  CHEST/LUNG: Clear to auscultation bilaterally; No wheezes, rales or rhonchi  HEART: Regular rate and rhythm; No murmurs, rubs, or gallops  ABDOMEN: Soft, Nontender, Nondistended; Bowel sounds present, no masses, external  exam no evidence of lesions or rash, minimal TTP on external rectal area  EXTREMITIES:  2+ Peripheral Pulses, No clubbing, cyanosis, or edema  SKIN: Warm, dry, in tact, no rashes or lesions  PSYCH: flat    LABS:  12-02    140  |  104  |  10  ----------------------------<  94  3.7   |  21<L>  |  0.77  12-01    140  |  105  |  10  ----------------------------<  116<H>  3.8   |  22  |  0.77    Ca    8.7      02 Dec 2018 06:23  Ca    9.0      01 Dec 2018 07:09    TPro  6.5  /  Alb  3.6  /  TBili  0.1<L>  /  DBili  x   /  AST  11  /  ALT  8<L>  /  AlkPhos  51  12-02  TPro  7.6  /  Alb  4.2  /  TBili  0.2  /  DBili  x   /  AST  13  /  ALT  11  /  AlkPhos  59  12-01                                              11.6   8.2   )-----------( 310      ( 02 Dec 2018 22:26 )             35.7                         9.4    7.51  )-----------( 222      ( 02 Dec 2018 09:15 )             28.4                         10.7   6.69  )-----------( 263      ( 01 Dec 2018 08:42 )             31.6 ***************************************************************  Sarwat Suarezlerman, PGY1  Internal Medicine   pager: [phone]  ***************************************************************    JESSY AVILAR  38y  MRN: [id number]    Patient is a 38y old  Female who presents with a chief complaint of rectal pain, RUQ pain, melena (02 Dec 2018 08:07)    Subjective: no events ON. Denies fever, CP, SOB, abn pain, N/V, dysuria. Tolerating diet.      Objective:  Vitals: Vital Signs Last 24 Hrs  T(F): 98.1 (12-03-18 @ 05:11), Max: 98.6 (12-02-18 @ 20:33)  HR: 76 (12-03-18 @ 05:11) (73 - 82)  BP: 110/71 (12-03-18 @ 05:11) (110/71 - 129/80)  RR: 18 (12-03-18 @ 05:11) (18 - 18)  SpO2: 99% (12-03-18 @ 05:11) (97% - 100%)                I&O's Summary  02 Dec 2018 07:01  -  03 Dec 2018 07:00  --------------------------------------------------------  IN: 1280 mL / OUT: 0 mL / NET: 1280 mL    PHYSICAL EXAM:  GENERAL: NAD, well-developed, middle aged female   NEURO: AO x3  EYES: conjunctiva and sclera clear  NECK: Supple, No JVD  CHEST/LUNG: Clear to auscultation bilaterally; No wheezes, rales or rhonchi  HEART: Regular rate and rhythm; No murmurs, rubs, or gallops  ABDOMEN: Soft, Nontender, Nondistended; Bowel sounds present, no masses, external  exam no evidence of lesions or rash, minimal TTP on external rectal area  EXTREMITIES:  2+ Peripheral Pulses, No clubbing, cyanosis, or edema  SKIN: Warm, dry, in tact, no rashes or lesions  PSYCH: flat    LABS:                     10.6   6.83  )-----------( 287      ( 03 Dec 2018 07:49 )             32.8   12-03    144  |  109<H>  |  6<L>  ----------------------------<  103<H>  3.8   |  24  |  0.79    Ca    9.5      03 Dec 2018 07:04    TPro  6.5  /  Alb  3.6  /  TBili  0.1<L>  /  DBili  x   /  AST  11  /  ALT  8<L>  /  AlkPhos  51  12-02    Consultants' Notes Reviewed: Gastroenterology    MEDICATIONS  (STANDING):  AQUAPHOR (petrolatum Ointment) 1 Application(s) Topical two times a day  hydroxychloroquine 200 milliGRAM(s) Oral daily  levothyroxine 50 MICROGram(s) Oral daily  pantoprazole  Injectable 40 milliGRAM(s) IV Push every 12 hours    MEDICATIONS  (PRN):  diphenhydrAMINE 25 milliGRAM(s) Oral every 4 hours PRN Rash and/or Itching  hydrocortisone hemorrhoidal Suppository 1 Suppository(s) Rectal two times a day PRN rectal pain  lidocaine 2% Gel 1 Application(s) Topical every 4 hours PRN rectal pain

## 2018-12-04 VITALS
TEMPERATURE: 99 F | RESPIRATION RATE: 18 BRPM | OXYGEN SATURATION: 100 % | HEART RATE: 78 BPM | DIASTOLIC BLOOD PRESSURE: 75 MMHG | SYSTOLIC BLOOD PRESSURE: 139 MMHG

## 2018-12-04 LAB
ANION GAP SERPL CALC-SCNC: 14 MMOL/L — SIGNIFICANT CHANGE UP (ref 5–17)
BUN SERPL-MCNC: 11 MG/DL — SIGNIFICANT CHANGE UP (ref 7–23)
CALCIUM SERPL-MCNC: 8.8 MG/DL — SIGNIFICANT CHANGE UP (ref 8.4–10.5)
CHLORIDE SERPL-SCNC: 106 MMOL/L — SIGNIFICANT CHANGE UP (ref 96–108)
CO2 SERPL-SCNC: 23 MMOL/L — SIGNIFICANT CHANGE UP (ref 22–31)
CREAT SERPL-MCNC: 0.77 MG/DL — SIGNIFICANT CHANGE UP (ref 0.5–1.3)
GLUCOSE SERPL-MCNC: 93 MG/DL — SIGNIFICANT CHANGE UP (ref 70–99)
HCT VFR BLD CALC: 30.4 % — LOW (ref 34.5–45)
HGB BLD-MCNC: 9.8 G/DL — LOW (ref 11.5–15.5)
MCHC RBC-ENTMCNC: 26.6 PG — LOW (ref 27–34)
MCHC RBC-ENTMCNC: 32.2 GM/DL — SIGNIFICANT CHANGE UP (ref 32–36)
MCV RBC AUTO: 82.6 FL — SIGNIFICANT CHANGE UP (ref 80–100)
PLATELET # BLD AUTO: 245 K/UL — SIGNIFICANT CHANGE UP (ref 150–400)
POTASSIUM SERPL-MCNC: 3.6 MMOL/L — SIGNIFICANT CHANGE UP (ref 3.5–5.3)
POTASSIUM SERPL-SCNC: 3.6 MMOL/L — SIGNIFICANT CHANGE UP (ref 3.5–5.3)
RBC # BLD: 3.68 M/UL — LOW (ref 3.8–5.2)
RBC # FLD: 13.2 % — SIGNIFICANT CHANGE UP (ref 10.3–14.5)
SODIUM SERPL-SCNC: 143 MMOL/L — SIGNIFICANT CHANGE UP (ref 135–145)
WBC # BLD: 6.29 K/UL — SIGNIFICANT CHANGE UP (ref 3.8–10.5)
WBC # FLD AUTO: 6.29 K/UL — SIGNIFICANT CHANGE UP (ref 3.8–10.5)

## 2018-12-04 PROCEDURE — 99239 HOSP IP/OBS DSCHRG MGMT >30: CPT

## 2018-12-04 PROCEDURE — 99232 SBSQ HOSP IP/OBS MODERATE 35: CPT | Mod: GC

## 2018-12-04 RX ORDER — KERA1/OXBN/AVOBNZ/O-CRL/H-QUIN 4 %-SPF 15
1 CREAM (GRAM) TOPICAL
Qty: 0 | Refills: 0 | COMMUNITY

## 2018-12-04 RX ORDER — PANTOPRAZOLE SODIUM 20 MG/1
40 TABLET, DELAYED RELEASE ORAL
Qty: 0 | Refills: 0 | Status: DISCONTINUED | OUTPATIENT
Start: 2018-12-05 | End: 2018-12-04

## 2018-12-04 RX ADMIN — PANTOPRAZOLE SODIUM 40 MILLIGRAM(S): 20 TABLET, DELAYED RELEASE ORAL at 05:06

## 2018-12-04 RX ADMIN — Medication 50 MICROGRAM(S): at 05:06

## 2018-12-04 RX ADMIN — Medication 200 MILLIGRAM(S): at 12:38

## 2018-12-04 RX ADMIN — Medication 1 APPLICATION(S): at 05:06

## 2018-12-04 NOTE — PROGRESS NOTE ADULT - PROBLEM SELECTOR PLAN 2
Improving.  Patient reporting severe perineal and rectal pain. No overt finding on exam except for small internal hemorrhoids. No overt hematochezia. CT scan without overt findings but possibly too small to appreciate  - topical lidocaine, and gel, pain improving, no evidence of rash or vesicles   - tylenol prn   - avoid NSAIDs

## 2018-12-04 NOTE — PROGRESS NOTE ADULT - PROBLEM SELECTOR PLAN 1
Patient with new onset melena x 3 days PTA associated with RUQ pain. Low suspicion for hepatobiliary process given no fevers, leukocytosis and normal LFTs. Possible gastritis vs PUD. Denies recent NSAID use. UA negative for infection  - CT pelvis without overt pathology  - Hb at baseline of 10s, and HD stable.   - No further episodes of melena since admission  - Protonix 40 BID PO  - EGD/colonoscopy without source of GI bleed

## 2018-12-04 NOTE — PROGRESS NOTE ADULT - SUBJECTIVE AND OBJECTIVE BOX
***************************************************************  Mark Hellerman, PGY1  Internal Medicine   pager: [phone]  ***************************************************************    JESSY HEARD  38y  MRN: [id number]    Patient is a 38y old  Female who presents with a chief complaint of Melena (04 Dec 2018 07:23)      Subjective:   - No events ON.   - No further episodes of blood stool  - EGD/colonoscopy without evidence of bleed  - Denies fever, CP, SOB, abn pain, N/V, dysuria. Tolerating diet.      MEDICATIONS  (STANDING):  AQUAPHOR (petrolatum Ointment) 1 Application(s) Topical two times a day  hydroxychloroquine 200 milliGRAM(s) Oral daily  levothyroxine 50 MICROGram(s) Oral daily  pantoprazole  Injectable 40 milliGRAM(s) IV Push every 12 hours    MEDICATIONS  (PRN):  diphenhydrAMINE 25 milliGRAM(s) Oral every 4 hours PRN Rash and/or Itching  hydrocortisone hemorrhoidal Suppository 1 Suppository(s) Rectal two times a day PRN rectal pain  lidocaine 2% Gel 1 Application(s) Topical every 4 hours PRN rectal pain      Objective:    Vitals: Vital Signs Last 24 Hrs  T(C): 36.7 (12-04-18 @ 05:04), Max: 37 (12-03-18 @ 16:05)  T(F): 98.1 (12-04-18 @ 05:04), Max: 98.6 (12-03-18 @ 16:05)  HR: 74 (12-04-18 @ 05:04) (62 - 80)  BP: 106/65 (12-04-18 @ 05:04) (100/60 - 123/80)  BP(mean): --  RR: 18 (12-04-18 @ 05:04) (17 - 19)  SpO2: 100% (12-04-18 @ 05:04) (97% - 100%)            I&O's Summary    03 Dec 2018 07:01  -  04 Dec 2018 07:00  --------------------------------------------------------  IN: 780 mL / OUT: 0 mL / NET: 780 mL          PHYSICAL EXAM:  GENERAL: NAD, well-developed, middle aged female   NEURO: AO x3  EYES: conjunctiva and sclera clear  NECK: Supple, No JVD  CHEST/LUNG: Clear to auscultation bilaterally; No wheezes, rales or rhonchi  HEART: Regular rate and rhythm; No murmurs, rubs, or gallops  ABDOMEN: Soft, Nontender, Nondistended; Bowel sounds present, no masses, external  exam no evidence of lesions or rash, minimal TTP on external rectal area  EXTREMITIES:  2+ Peripheral Pulses, No clubbing, cyanosis, or edema  SKIN: Warm, dry, in tact, no rashes or lesions    LABS:  12-04    143  |  106  |  11  ----------------------------<  93  3.6   |  23  |  0.77  12-03    144  |  109<H>  |  6<L>  ----------------------------<  103<H>  3.8   |  24  |  0.79  12-02    140  |  104  |  10  ----------------------------<  94  3.7   |  21<L>  |  0.77    Ca    8.8      04 Dec 2018 07:06  Ca    9.5      03 Dec 2018 07:04  Ca    8.7      02 Dec 2018 06:23    TPro  6.5  /  Alb  3.6  /  TBili  0.1<L>  /  DBili  x   /  AST  11  /  ALT  8<L>  /  AlkPhos  51  12-02                                         9.8    6.29  )-----------( 245      ( 04 Dec 2018 07:59 )             30.4                         10.6   6.83  )-----------( 287      ( 03 Dec 2018 07:49 )             32.8                         11.6   8.2   )-----------( 310      ( 02 Dec 2018 22:26 )             35.7 ***************************************************************  Sarwat Suarezlerman, PGY1  Internal Medicine   pager: [phone]  ***************************************************************    JESSY HEARD  38y  MRN: [id number]    Patient is a 38y old  Female who presents with a chief complaint of Melena (04 Dec 2018 07:23)    Subjective:   - No events ON.   - No further episodes of blood stool  - EGD/colonoscopy without evidence of bleed  - Denies fever, CP, SOB, abn pain, N/V, dysuria. Tolerating diet.      Objective:  Vitals: Vital Signs Last 24 Hrs  T(F): 98.1 (12-04-18 @ 05:04), Max: 98.6 (12-03-18 @ 16:05)  HR: 74 (12-04-18 @ 05:04) (62 - 80)  BP: 106/65 (12-04-18 @ 05:04) (100/60 - 123/80)  RR: 18 (12-04-18 @ 05:04) (17 - 19)  SpO2: 100% (12-04-18 @ 05:04) (97% - 100%)                I&O's Summary  03 Dec 2018 07:01  -  04 Dec 2018 07:00  --------------------------------------------------------  IN: 780 mL / OUT: 0 mL / NET: 780 mL    PHYSICAL EXAM:  GENERAL: NAD, well-developed, middle aged female   NEURO: AO x3  EYES: conjunctiva and sclera clear  NECK: Supple, No JVD  CHEST/LUNG: Clear to auscultation bilaterally; No wheezes, rales or rhonchi  HEART: Regular rate and rhythm; No murmurs, rubs, or gallops  ABDOMEN: Soft, Nontender, Nondistended; Bowel sounds present, no masses, external  exam no evidence of lesions or rash, minimal TTP on external rectal area  EXTREMITIES:  2+ Peripheral Pulses, No clubbing, cyanosis, or edema  SKIN: Warm, dry, in tact, no rashes or lesions    LABS:  12-04  143  |  106  |  11  ----------------------------<  93  3.6   |  23  |  0.77    Ca    8.8      04 Dec 2018 07:06                                  9.8    6.29  )-----------( 245      ( 04 Dec 2018 07:59 )             30.4              MEDICATIONS  (STANDING):  AQUAPHOR (petrolatum Ointment) 1 Application(s) Topical two times a day  hydroxychloroquine 200 milliGRAM(s) Oral daily  levothyroxine 50 MICROGram(s) Oral daily  pantoprazole  Injectable 40 milliGRAM(s) IV Push every 12 hours    MEDICATIONS  (PRN):  diphenhydrAMINE 25 milliGRAM(s) Oral every 4 hours PRN Rash and/or Itching  hydrocortisone hemorrhoidal Suppository 1 Suppository(s) Rectal two times a day PRN rectal pain  lidocaine 2% Gel 1 Application(s) Topical every 4 hours PRN rectal pain    CONSULTANTS' NOTES REVIEWED: Gastroenterology

## 2018-12-04 NOTE — PROGRESS NOTE ADULT - ASSESSMENT
Impression:    1. Acute blood loss anemia with melena: Upper endoscopy and colonoscopy yesterday with only notable finding of internal hemorrhoids, but no active sources of bleeding identified. No additional episodes of overt GI bleeding, HD stable, and with stable Hgb - 10.6 on 12/3/18.  2. Abdominal pain: Resolved.  3. Rectal pain: Resolved.  4. SLE c/b lupus nephritis: On Plaquenil   5. HTN  6. Hypothyroidism    Recommendations:  - Monitor CBCs daily  - Monitor bowel movements  - Transfuse for goal Hgb >/= 7.0  - PO PPI  - Two large bore IVs  - Maintain active type and screen  - Supportive care per primary team  - Please call GI ([phone]) if there are any additional questions or concerns. Please call on-call GI fellow after 5pm and on weekends.    Doron Andrews MD  Gastroenterology Fellow  Pager number: [phone] / [phone] Impression:    1. Acute blood loss anemia with melena: Upper endoscopy and colonoscopy yesterday with only notable finding of internal hemorrhoids, but no active sources of bleeding identified. No additional episodes of overt GI bleeding, HD stable, and with stable Hgb - 10.6 on 12/3/18.  2. Abdominal pain: Resolved. Differential diagnosis includes irritable bowel syndrome and nephrolithiasis.  3. Rectal pain: Resolved. No masses or ulcer visualized on colonoscopy yesterday. Differential diagnosis includes proctalgia fugax.  4. SLE c/b lupus nephritis: On Plaquenil   5. HTN  6. Hypothyroidism    Recommendations:  - Monitor CBCs daily  - Monitor bowel movements  - Transfuse for goal Hgb >/= 7.0  - PO PPI  - Supportive care per primary team  - Please call GI ([phone]) if there are any additional questions or concerns. Please call on-call GI fellow after 5pm and on weekends.    Doron Andrews MD  Gastroenterology Fellow  Pager number: [phone] / [phone] Impression:    1. Acute blood loss anemia with melena: Upper endoscopy and colonoscopy yesterday with only notable finding of internal hemorrhoids, but no active sources of bleeding identified. No additional episodes of overt GI bleeding, HD stable, and with stable Hgb - 10.6 on 12/3/18.  2. Abdominal pain: Resolved. Differential diagnosis includes irritable bowel syndrome and nephrolithiasis.  3. Rectal pain: Resolved. No masses or ulcer visualized on colonoscopy yesterday. Differential diagnosis includes proctalgia fugax.  4. SLE c/b lupus nephritis: On Plaquenil   5. HTN  6. Hypothyroidism    Recommendations:  - Monitor CBCs daily  - Monitor bowel movements  - Transfuse for goal Hgb >/= 7.0  - PO PPI  - Supportive care per primary team  - Can check TTG and IgA levels given anemia to r/o celiac disease (can be done as outpatient)  - Please call GI ([phone]) if there are any additional questions or concerns. Please call on-call GI fellow after 5pm and on weekends.    Doron Andrews MD  Gastroenterology Fellow  Pager number: [phone] / [phone]

## 2018-12-04 NOTE — PROGRESS NOTE ADULT - PROBLEM SELECTOR PLAN 6
DVT: ambulate as tolerated  Diet: regular diet  Dispo: likely home today DVT: ambulate as tolerated  Diet: regular diet  Dispo: d/c home today

## 2018-12-04 NOTE — PROGRESS NOTE ADULT - SUBJECTIVE AND OBJECTIVE BOX
Interval Events:   - Patient underwent upper endoscopy and colonoscopy yesterday with no active source of bleeding identified.    Allergies:  penicillin (Hives)  penicillin (Hives; Rash)    Hospital Medications:  AQUAPHOR (petrolatum Ointment) 1 Application(s) Topical two times a day  diphenhydrAMINE 25 milliGRAM(s) Oral every 4 hours PRN  hydrocortisone hemorrhoidal Suppository 1 Suppository(s) Rectal two times a day PRN  hydroxychloroquine 200 milliGRAM(s) Oral daily  levothyroxine 50 MICROGram(s) Oral daily  lidocaine 2% Gel 1 Application(s) Topical every 4 hours PRN  pantoprazole  Injectable 40 milliGRAM(s) IV Push every 12 hours    PMHX/PSHX:  Lupus (systemic lupus erythematosus)  Hypothyroidism    ROS:     General:  No wt loss, fevers, chills, night sweats, fatigue,   Eyes:  Good vision, no reported pain  ENT:  No sore throat, pain, runny nose, dysphagia  CV:  No pain, palpitations, hypo/hypertension  Pulm:  No dyspnea, cough, tachypnea, wheezing  GI:  + pain, No nausea, No vomiting, No diarrhea, No constipation, No weight loss, No fever, No pruritis, No rectal bleeding, + tarry stools, No dysphagia  :  No pain, bleeding, incontinence, nocturia  Skin:  No rash, tattoos, scars, edema    PHYSICAL EXAM:   Vital Signs:  Vital Signs Last 24 Hrs  T(C): 36.7 (04 Dec 2018 05:04), Max: 37 (03 Dec 2018 16:05)  T(F): 98.1 (04 Dec 2018 05:04), Max: 98.6 (03 Dec 2018 16:05)  HR: 74 (04 Dec 2018 05:04) (62 - 80)  BP: 106/65 (04 Dec 2018 05:04) (100/60 - 123/80)  BP(mean): --  RR: 18 (04 Dec 2018 05:04) (17 - 19)  SpO2: 100% (04 Dec 2018 05:04) (97% - 100%)    GENERAL:  No acute distress  HEENT:  Normocephalic/atraumatic, no scleral icterus  CHEST:  Clear to auscultation bilaterally, no wheezes/rales/ronchi, no accessory muscle use  HEART:  Regular rate and rhythm, no murmurs/rubs/gallops  ABDOMEN:  Soft, non-tender, non-distended, normoactive bowel sounds  BACK: No CVA tenderness  EXTREMITIES: No cyanosis, clubbing, or edema  RECTAL: Patient declined (per ER exam, internal hemorrhoids, but no blood or masses in rectal vault)  SKIN:  No rash/erythema  NEURO:  Alert and oriented x 3, no asterixis    LABS:                        10.6   6.83  )-----------( 287      ( 03 Dec 2018 07:49 )             32.8     Mean Cell Volume: 82.4 fl (12-03-18 @ 07:49)    12-03    144  |  109<H>  |  6<L>  ----------------------------<  103<H>  3.8   |  24  |  0.    Ca    9.5      03 Dec 2018 07:04               10.6   6.83  )-----------( 287      ( 03 Dec 2018 07:49 )             32.8                         11.6   8.2   )-----------( 310      ( 02 Dec 2018 22:26 )             35.7                         9.4    7.51  )-----------( 222      ( 02 Dec 2018 09:15 )             28.4                         10.7   6.69  )-----------( 263      ( 01 Dec 2018 08:42 )             31.6     Imaging:    No new imaging    Procedures:    < from: Upper Endoscopy (12.03.18 @ 07:58) >  Findings:       The examined esophagus was normal.       The entire examined stomach was normal.       The duodenal bulb and 2nd part of the duodenum were normal.                                                                                                        Impression:          - Normal esophagus.                       - Normal stomach.              - Normal duodenal bulb and 2nd part of the duodenum.                       - No specimens collected.                       - No evidence of upper GI bleeding on this exam.  Recommendation:      - Perform a colonoscopy today.    < end of copied text >    < from: Colonoscopy (12.03.18 @ 07:56) >  Findings:       Non-bleeding internal hemorrhoids were found during retroflexion. The hemorrhoids were small.       The terminal ileum appeared normal.       The exam was otherwise without abnormality.                                                                                                        Impression:          - Non-bleeding internal hemorrhoids.                       - The examination was otherwise normal.                       - No specimens collected.                       - No evidence of bleeding seen on this exam.  Recommendation:      - [facility].                       - Resume regular diet.       - Repeat colonoscopy in 10 years for screening purposes.    < end of copied text > Interval Events:   - The patient underwent upper endoscopy and colonoscopy yesterday with no active source of bleeding identified.  - The patient states that she feels fine today. She denies nausea, vomiting, and abdominal pain. The patient denies bloody emesis, coffee ground emesis, bloody stools, and black tarry stools.  - She is eager to go home    Allergies:  penicillin (Hives)  penicillin (Hives; Rash)    Hospital Medications:  AQUAPHOR (petrolatum Ointment) 1 Application(s) Topical two times a day  diphenhydrAMINE 25 milliGRAM(s) Oral every 4 hours PRN  hydrocortisone hemorrhoidal Suppository 1 Suppository(s) Rectal two times a day PRN  hydroxychloroquine 200 milliGRAM(s) Oral daily  levothyroxine 50 MICROGram(s) Oral daily  lidocaine 2% Gel 1 Application(s) Topical every 4 hours PRN  pantoprazole  Injectable 40 milliGRAM(s) IV Push every 12 hours    PMHX/PSHX:  Lupus (systemic lupus erythematosus)  Hypothyroidism    ROS:     General:  No wt loss, fevers, chills, night sweats, fatigue,   Eyes:  Good vision, no reported pain  ENT:  No sore throat, pain, runny nose, dysphagia  CV:  No pain, palpitations, hypo/hypertension  Pulm:  No dyspnea, cough, tachypnea, wheezing  GI:  + pain, No nausea, No vomiting, No diarrhea, No constipation, No weight loss, No fever, No pruritis, No rectal bleeding, + tarry stools, No dysphagia  :  No pain, bleeding, incontinence, nocturia  Skin:  No rash, tattoos, scars, edema    PHYSICAL EXAM:   Vital Signs:  Vital Signs Last 24 Hrs  T(C): 36.7 (04 Dec 2018 05:04), Max: 37 (03 Dec 2018 16:05)  T(F): 98.1 (04 Dec 2018 05:04), Max: 98.6 (03 Dec 2018 16:05)  HR: 74 (04 Dec 2018 05:04) (62 - 80)  BP: 106/65 (04 Dec 2018 05:04) (100/60 - 123/80)  BP(mean): --  RR: 18 (04 Dec 2018 05:04) (17 - 19)  SpO2: 100% (04 Dec 2018 05:04) (97% - 100%)    GENERAL:  No acute distress  HEENT:  Normocephalic/atraumatic, no scleral icterus  CHEST:  Clear to auscultation bilaterally, no wheezes/rales/ronchi, no accessory muscle use  HEART:  Regular rate and rhythm, no murmurs/rubs/gallops  ABDOMEN:  Soft, non-tender, non-distended, normoactive bowel sounds  BACK: No CVA tenderness  EXTREMITIES: No cyanosis, clubbing, or edema  RECTAL: Patient declined (per ER exam, internal hemorrhoids, but no blood or masses in rectal vault)  SKIN:  No rash/erythema  NEURO:  Alert and oriented x 3, no asterixis    LABS:                        10.6   6.83  )-----------( 287      ( 03 Dec 2018 07:49 )             32.8     Mean Cell Volume: 82.4 fl (12-03-18 @ 07:49)    12-03    144  |  109<H>  |  6<L>  ----------------------------<  103<H>  3.8   |  24  |  0.    Ca    9.5      03 Dec 2018 07:04               10.6   6.83  )-----------( 287      ( 03 Dec 2018 07:49 )             32.8                         11.6   8.2   )-----------( 310      ( 02 Dec 2018 22:26 )             35.7                         9.4    7.51  )-----------( 222      ( 02 Dec 2018 09:15 )             28.4                         10.7   6.69  )-----------( 263      ( 01 Dec 2018 08:42 )             31.6     Imaging:    No new imaging    Procedures:    < from: Upper Endoscopy (12.03.18 @ 07:58) >  Findings:       The examined esophagus was normal.       The entire examined stomach was normal.       The duodenal bulb and 2nd part of the duodenum were normal.                                                                                                        Impression:          - Normal esophagus.                       - Normal stomach.              - Normal duodenal bulb and 2nd part of the duodenum.                       - No specimens collected.                       - No evidence of upper GI bleeding on this exam.  Recommendation:      - Perform a colonoscopy today.    < end of copied text >    < from: Colonoscopy (12.03.18 @ 07:56) >  Findings:       Non-bleeding internal hemorrhoids were found during retroflexion. The hemorrhoids were small.       The terminal ileum appeared normal.       The exam was otherwise without abnormality.                                                                                                        Impression:          - Non-bleeding internal hemorrhoids.                       - The examination was otherwise normal.                       - No specimens collected.                       - No evidence of bleeding seen on this exam.  Recommendation:      - [facility] to [facility] for [facility] care.                       - Resume regular diet.       - Repeat colonoscopy in 10 years for screening purposes.    < end of copied text >

## 2018-12-04 NOTE — PROGRESS NOTE ADULT - ASSESSMENT
38yoF w/ PMHx significant for SLE on Plaquenil c/b stage V nephritis, HTN, and hypothyroidism coming in with 3 days of rectal pain and new onset RUQ abdominal pain and melena, concerning for possible upper GI bleed and hemorrhoidal pain.  EGD and colonoscopy without evidence of bleed. Plan for d/c today.

## 2018-12-04 NOTE — PROGRESS NOTE ADULT - PROVIDER SPECIALTY LIST ADULT
Gastroenterology
Gastroenterology
Internal Medicine

## 2018-12-04 NOTE — PROGRESS NOTE ADULT - ATTENDING COMMENTS
Patient seen and examined. Agree with above. The patient has not had any further melena, EGD/colonoscopy yesterday were unremarkable. She can follow-up as outpatient, no GI objection to discharge. Given that her iron studies are somewhat borderline, can check TTG and IgA levels to r/o celiac disease (can be done as outpatient).
Pt c/o persistent severe rectal pain from hemorrhoids- Anusol suppository ordered.     EGD/colonoscopy on Monday.
Patient stable and amenable for discharge home today. All questions answered. Total discharge time spent = 35minutes.
Pt with SLE aw abdominal pain, melena.     No recurrent bleeding. For EGD/colon on Monday.     Pt requesting to speak to  about possible insurance lapse- please call on Monday.

## 2019-01-09 PROCEDURE — 86900 BLOOD TYPING SEROLOGIC ABO: CPT

## 2019-01-09 PROCEDURE — 99285 EMERGENCY DEPT VISIT HI MDM: CPT

## 2019-01-09 PROCEDURE — 72193 CT PELVIS W/DYE: CPT

## 2019-01-09 PROCEDURE — 85027 COMPLETE CBC AUTOMATED: CPT

## 2019-01-09 PROCEDURE — 87086 URINE CULTURE/COLONY COUNT: CPT

## 2019-01-09 PROCEDURE — 80048 BASIC METABOLIC PNL TOTAL CA: CPT

## 2019-01-09 PROCEDURE — 93005 ELECTROCARDIOGRAM TRACING: CPT

## 2019-01-09 PROCEDURE — 84100 ASSAY OF PHOSPHORUS: CPT

## 2019-01-09 PROCEDURE — 82962 GLUCOSE BLOOD TEST: CPT

## 2019-01-09 PROCEDURE — 82272 OCCULT BLD FECES 1-3 TESTS: CPT

## 2019-01-09 PROCEDURE — 86850 RBC ANTIBODY SCREEN: CPT

## 2019-01-09 PROCEDURE — 80053 COMPREHEN METABOLIC PANEL: CPT

## 2019-01-09 PROCEDURE — 86901 BLOOD TYPING SEROLOGIC RH(D): CPT

## 2019-01-09 PROCEDURE — 81001 URINALYSIS AUTO W/SCOPE: CPT

## 2019-01-09 PROCEDURE — 83735 ASSAY OF MAGNESIUM: CPT

## 2019-01-09 PROCEDURE — 76705 ECHO EXAM OF ABDOMEN: CPT

## 2019-02-17 ENCOUNTER — EMERGENCY (EMERGENCY)
Facility: HOSPITAL | Age: 39
LOS: 1 days | End: 2019-02-17
Attending: EMERGENCY MEDICINE
Payer: MEDICAID

## 2019-02-17 VITALS
RESPIRATION RATE: 18 BRPM | OXYGEN SATURATION: 100 % | SYSTOLIC BLOOD PRESSURE: 110 MMHG | DIASTOLIC BLOOD PRESSURE: 63 MMHG | HEART RATE: 73 BPM

## 2019-02-17 VITALS
OXYGEN SATURATION: 100 % | HEART RATE: 88 BPM | HEIGHT: 61 IN | DIASTOLIC BLOOD PRESSURE: 92 MMHG | WEIGHT: 125 LBS | SYSTOLIC BLOOD PRESSURE: 153 MMHG | TEMPERATURE: 98 F | RESPIRATION RATE: 18 BRPM

## 2019-02-17 LAB
ALBUMIN SERPL ELPH-MCNC: 4.2 G/DL — SIGNIFICANT CHANGE UP (ref 3.3–5)
ALP SERPL-CCNC: 63 U/L — SIGNIFICANT CHANGE UP (ref 40–120)
ALT FLD-CCNC: 11 U/L — SIGNIFICANT CHANGE UP (ref 10–45)
ANION GAP SERPL CALC-SCNC: 13 MMOL/L — SIGNIFICANT CHANGE UP (ref 5–17)
APPEARANCE UR: CLEAR — SIGNIFICANT CHANGE UP
APTT BLD: 30.5 SEC — SIGNIFICANT CHANGE UP (ref 27.5–36.3)
AST SERPL-CCNC: 14 U/L — SIGNIFICANT CHANGE UP (ref 10–40)
BASOPHILS # BLD AUTO: 0 K/UL — SIGNIFICANT CHANGE UP (ref 0–0.2)
BASOPHILS NFR BLD AUTO: 0.5 % — SIGNIFICANT CHANGE UP (ref 0–2)
BILIRUB SERPL-MCNC: 0.2 MG/DL — SIGNIFICANT CHANGE UP (ref 0.2–1.2)
BILIRUB UR-MCNC: NEGATIVE — SIGNIFICANT CHANGE UP
BUN SERPL-MCNC: 10 MG/DL — SIGNIFICANT CHANGE UP (ref 7–23)
CALCIUM SERPL-MCNC: 9.1 MG/DL — SIGNIFICANT CHANGE UP (ref 8.4–10.5)
CHLORIDE SERPL-SCNC: 106 MMOL/L — SIGNIFICANT CHANGE UP (ref 96–108)
CO2 SERPL-SCNC: 22 MMOL/L — SIGNIFICANT CHANGE UP (ref 22–31)
COLOR SPEC: COLORLESS — SIGNIFICANT CHANGE UP
CREAT SERPL-MCNC: 0.68 MG/DL — SIGNIFICANT CHANGE UP (ref 0.5–1.3)
DIFF PNL FLD: NEGATIVE — SIGNIFICANT CHANGE UP
EOSINOPHIL # BLD AUTO: 0.5 K/UL — SIGNIFICANT CHANGE UP (ref 0–0.5)
EOSINOPHIL NFR BLD AUTO: 7.6 % — HIGH (ref 0–6)
GLUCOSE SERPL-MCNC: 99 MG/DL — SIGNIFICANT CHANGE UP (ref 70–99)
GLUCOSE UR QL: NEGATIVE — SIGNIFICANT CHANGE UP
HCT VFR BLD CALC: 31.7 % — LOW (ref 34.5–45)
HGB BLD-MCNC: 11 G/DL — LOW (ref 11.5–15.5)
INR BLD: 1.06 RATIO — SIGNIFICANT CHANGE UP (ref 0.88–1.16)
KETONES UR-MCNC: NEGATIVE — SIGNIFICANT CHANGE UP
LEUKOCYTE ESTERASE UR-ACNC: NEGATIVE — SIGNIFICANT CHANGE UP
LYMPHOCYTES # BLD AUTO: 2.4 K/UL — SIGNIFICANT CHANGE UP (ref 1–3.3)
LYMPHOCYTES # BLD AUTO: 35.2 % — SIGNIFICANT CHANGE UP (ref 13–44)
MCHC RBC-ENTMCNC: 26.9 PG — LOW (ref 27–34)
MCHC RBC-ENTMCNC: 34.7 GM/DL — SIGNIFICANT CHANGE UP (ref 32–36)
MCV RBC AUTO: 77.7 FL — LOW (ref 80–100)
MONOCYTES # BLD AUTO: 0.4 K/UL — SIGNIFICANT CHANGE UP (ref 0–0.9)
MONOCYTES NFR BLD AUTO: 6.2 % — SIGNIFICANT CHANGE UP (ref 2–14)
NEUTROPHILS # BLD AUTO: 3.4 K/UL — SIGNIFICANT CHANGE UP (ref 1.8–7.4)
NEUTROPHILS NFR BLD AUTO: 50.6 % — SIGNIFICANT CHANGE UP (ref 43–77)
NITRITE UR-MCNC: NEGATIVE — SIGNIFICANT CHANGE UP
NT-PROBNP SERPL-SCNC: 24 PG/ML — SIGNIFICANT CHANGE UP (ref 0–300)
PH UR: 6.5 — SIGNIFICANT CHANGE UP (ref 5–8)
PLATELET # BLD AUTO: 261 K/UL — SIGNIFICANT CHANGE UP (ref 150–400)
POTASSIUM SERPL-MCNC: 3.8 MMOL/L — SIGNIFICANT CHANGE UP (ref 3.5–5.3)
POTASSIUM SERPL-SCNC: 3.8 MMOL/L — SIGNIFICANT CHANGE UP (ref 3.5–5.3)
PROT SERPL-MCNC: 7.9 G/DL — SIGNIFICANT CHANGE UP (ref 6–8.3)
PROT UR-MCNC: NEGATIVE — SIGNIFICANT CHANGE UP
PROTHROM AB SERPL-ACNC: 12.1 SEC — SIGNIFICANT CHANGE UP (ref 10–12.9)
RBC # BLD: 4.09 M/UL — SIGNIFICANT CHANGE UP (ref 3.8–5.2)
RBC # FLD: 13 % — SIGNIFICANT CHANGE UP (ref 10.3–14.5)
SODIUM SERPL-SCNC: 141 MMOL/L — SIGNIFICANT CHANGE UP (ref 135–145)
SP GR SPEC: 1.01 — LOW (ref 1.01–1.02)
TROPONIN T, HIGH SENSITIVITY RESULT: <6 NG/L — SIGNIFICANT CHANGE UP (ref 0–51)
UROBILINOGEN FLD QL: NEGATIVE — SIGNIFICANT CHANGE UP
WBC # BLD: 6.7 K/UL — SIGNIFICANT CHANGE UP (ref 3.8–10.5)
WBC # FLD AUTO: 6.7 K/UL — SIGNIFICANT CHANGE UP (ref 3.8–10.5)

## 2019-02-17 PROCEDURE — 83880 ASSAY OF NATRIURETIC PEPTIDE: CPT

## 2019-02-17 PROCEDURE — 85027 COMPLETE CBC AUTOMATED: CPT

## 2019-02-17 PROCEDURE — 99284 EMERGENCY DEPT VISIT MOD MDM: CPT

## 2019-02-17 PROCEDURE — 93010 ELECTROCARDIOGRAM REPORT: CPT

## 2019-02-17 PROCEDURE — 80053 COMPREHEN METABOLIC PANEL: CPT

## 2019-02-17 PROCEDURE — 81003 URINALYSIS AUTO W/O SCOPE: CPT

## 2019-02-17 PROCEDURE — 85610 PROTHROMBIN TIME: CPT

## 2019-02-17 PROCEDURE — 71046 X-RAY EXAM CHEST 2 VIEWS: CPT

## 2019-02-17 PROCEDURE — 85730 THROMBOPLASTIN TIME PARTIAL: CPT

## 2019-02-17 PROCEDURE — 99285 EMERGENCY DEPT VISIT HI MDM: CPT | Mod: 25

## 2019-02-17 PROCEDURE — 71046 X-RAY EXAM CHEST 2 VIEWS: CPT | Mod: 26

## 2019-02-17 PROCEDURE — 93005 ELECTROCARDIOGRAM TRACING: CPT

## 2019-02-17 PROCEDURE — 84484 ASSAY OF TROPONIN QUANT: CPT

## 2019-02-17 RX ORDER — DIAZEPAM 5 MG
1 TABLET ORAL
Qty: 3 | Refills: 0 | OUTPATIENT
Start: 2019-02-17 | End: 2019-02-19

## 2019-02-17 RX ORDER — ACETAMINOPHEN 500 MG
975 TABLET ORAL ONCE
Qty: 0 | Refills: 0 | Status: COMPLETED | OUTPATIENT
Start: 2019-02-17 | End: 2019-02-17

## 2019-02-17 RX ORDER — LIDOCAINE 4 G/100G
1 CREAM TOPICAL ONCE
Qty: 0 | Refills: 0 | Status: COMPLETED | OUTPATIENT
Start: 2019-02-17 | End: 2019-02-17

## 2019-02-17 RX ORDER — DIAZEPAM 5 MG
5 TABLET ORAL ONCE
Qty: 0 | Refills: 0 | Status: DISCONTINUED | OUTPATIENT
Start: 2019-02-17 | End: 2019-02-17

## 2019-02-17 RX ADMIN — Medication 975 MILLIGRAM(S): at 12:15

## 2019-02-17 RX ADMIN — LIDOCAINE 1 PATCH: 4 CREAM TOPICAL at 12:13

## 2019-02-17 RX ADMIN — Medication 975 MILLIGRAM(S): at 12:45

## 2019-02-17 RX ADMIN — Medication 5 MILLIGRAM(S): at 12:14

## 2019-02-17 NOTE — ED PROVIDER NOTE - NEUROLOGICAL, MLM
Alert and oriented, no focal deficits, strength intact and equal bilateral upper/lower extremity. subjective sensory deficit to light touch in the left upper extremity Alert and oriented, no focal deficits, strength intact and equal bilateral upper/lower extremity. subjective sensory deficit to light touch in the left upper extremity however objectively sensory intact to light touch

## 2019-02-17 NOTE — ED PROVIDER NOTE - NSFOLLOWUPINSTRUCTIONS_ED_ALL_ED_FT
Follow up with your Primary Care Physician within the next 2-3 days  Bring a copy of your test results with you to your appointment  Continue your current medication regimen  Return to the Emergency Room if you experience new or worsening symptoms  Take Tylenol 975 mg every 6 hrs as needed for pain.  Take valium 5mg (1 tablet) every 8 hrs as needed for pain. Do not drink alcohol or drive after taking valium.   Apply lidoderm patch to the affected area 1x per day ( you can buy these at the pharmacy)

## 2019-02-17 NOTE — ED PROVIDER NOTE - PLAN OF CARE
Follow up with your Primary Care Physician within the next 2-3 days  Bring a copy of your test results with you to your appointment  Continue your current medication regimen  Return to the Emergency Room if you experience new or worsening symptoms  Take Tylenol 650mg every 6 hrs as needed for pain.  Take valium 5mg (1 tablet) every 8 hrs as needed for pain. Do not drink alcohol or drive after taking valium.   Apply lidoderm patch to the affected area 1x per day ( you can buy these at the pharmacy)

## 2019-02-17 NOTE — ED PROVIDER NOTE - PROGRESS NOTE DETAILS
patient reevaluated and resting comfortably. Reports feeling significant improvement, discussed all results and return precautions - Sabina Hernandez PA-C

## 2019-02-17 NOTE — ED ADULT NURSE NOTE - OBJECTIVE STATEMENT
Pt is a 38 yo F who came to the ED amb c/o chest pain. States she has sharp, intermittent chest pain x 3 weeks with sharp constant pain in left arm radiating down to left foot x 3 weeks. States hx lupus, feels like her lupus. A/O x3, denies sob, palpitations, no n/v/diaphoresis, no dizziness/lightheadedness.

## 2019-02-17 NOTE — ED PROVIDER NOTE - CLINICAL SUMMARY MEDICAL DECISION MAKING FREE TEXT BOX
DDx: pericarditis, atypical chest pain, myofacial pain of left shoulder. Will do cardiac workup, pain control, CXR, reassess.  ATTG: Dr. Sharp

## 2019-02-17 NOTE — ED PROVIDER NOTE - CARE PLAN
Principal Discharge DX:	Neck pain  Assessment and plan of treatment:	Follow up with your Primary Care Physician within the next 2-3 days  Bring a copy of your test results with you to your appointment  Continue your current medication regimen  Return to the Emergency Room if you experience new or worsening symptoms  Take Tylenol 650mg every 6 hrs as needed for pain.  Take valium 5mg (1 tablet) every 8 hrs as needed for pain. Do not drink alcohol or drive after taking valium.   Apply lidoderm patch to the affected area 1x per day ( you can buy these at the pharmacy)  Secondary Diagnosis:	Chest pain

## 2019-02-17 NOTE — ED PROVIDER NOTE - OBJECTIVE STATEMENT
38 F PMH lupus on Plaquenil, stage V nephritis, HTN, hypothyroidism coming in with chest pain and RUE and RLL pain. patient states she has had waxing and waning chest pain for 5 days, states it had resolved 2 days ago and has not since returned. The pain was left sided, sharp in nature and did not seem to have any identifiable aggravating or alleviating factors. It was neither exertional or pleuritic in nature. Patient also endorsing LUE and LLL pain, this has been constant for 3 weeks and not resolving with tylenol, so she stopped taking it 3 days ago. + associated neck pain and paresthesias in the extremities.

## 2019-02-17 NOTE — ED ADULT NURSE NOTE - CHPI ED NUR SYMPTOMS NEG
no syncope/no congestion/no nausea/no dizziness/no vomiting/no shortness of breath/no diaphoresis/no fever/no back pain/no chills

## 2019-05-21 NOTE — ED PROVIDER NOTE - NSTIMEPROVIDERCAREINITIATE_GEN_ER
Notified
Please call Foreign Gimenez and inform him he is to take the plavix. It was aricept he was to stop taking.
Please contact Dr. Paul Flower office at [phone] and see if she wanted [facility] Gimenez to stop plavix. He got that impression at his last appointment with her.
Spoke with Dr Humera Mijares and she state she has never prescribed pt Plavix and never told him to stop it. They only thing she discussed is that he does not have alzheimer  and discussed stopping the Aricept because it is not doing anything for him.
Spoke with MA and she will call back with update
05-Oct-2018 14:03

## 2019-07-17 ENCOUNTER — EMERGENCY (EMERGENCY)
Facility: HOSPITAL | Age: 39
LOS: 1 days | Discharge: HOME | End: 2019-07-17
Attending: STUDENT IN AN ORGANIZED HEALTH CARE EDUCATION/TRAINING PROGRAM
Payer: MEDICAID

## 2019-07-17 VITALS
HEART RATE: 96 BPM | RESPIRATION RATE: 16 BRPM | SYSTOLIC BLOOD PRESSURE: 133 MMHG | OXYGEN SATURATION: 99 % | TEMPERATURE: 98 F | DIASTOLIC BLOOD PRESSURE: 78 MMHG | HEIGHT: 60 IN | WEIGHT: 125 LBS

## 2019-07-17 VITALS — SYSTOLIC BLOOD PRESSURE: 128 MMHG | DIASTOLIC BLOOD PRESSURE: 66 MMHG | HEART RATE: 88 BPM

## 2019-07-17 LAB
ALBUMIN SERPL ELPH-MCNC: 3.6 G/DL — SIGNIFICANT CHANGE UP (ref 3.5–5)
ALP SERPL-CCNC: 66 U/L — SIGNIFICANT CHANGE UP (ref 40–120)
ALT FLD-CCNC: 21 U/L DA — SIGNIFICANT CHANGE UP (ref 10–60)
ANION GAP SERPL CALC-SCNC: 7 MMOL/L — SIGNIFICANT CHANGE UP (ref 5–17)
AST SERPL-CCNC: 11 U/L — SIGNIFICANT CHANGE UP (ref 10–40)
BILIRUB SERPL-MCNC: 0.2 MG/DL — SIGNIFICANT CHANGE UP (ref 0.2–1.2)
BUN SERPL-MCNC: 6 MG/DL — LOW (ref 7–18)
CALCIUM SERPL-MCNC: 8.7 MG/DL — SIGNIFICANT CHANGE UP (ref 8.4–10.5)
CHLORIDE SERPL-SCNC: 108 MMOL/L — SIGNIFICANT CHANGE UP (ref 96–108)
CO2 SERPL-SCNC: 26 MMOL/L — SIGNIFICANT CHANGE UP (ref 22–31)
CREAT SERPL-MCNC: 0.7 MG/DL — SIGNIFICANT CHANGE UP (ref 0.5–1.3)
GLUCOSE SERPL-MCNC: 101 MG/DL — HIGH (ref 70–99)
HCG UR QL: NEGATIVE — SIGNIFICANT CHANGE UP
HCT VFR BLD CALC: 36.1 % — SIGNIFICANT CHANGE UP (ref 34.5–45)
HGB BLD-MCNC: 12.1 G/DL — SIGNIFICANT CHANGE UP (ref 11.5–15.5)
MCHC RBC-ENTMCNC: 28.7 PG — SIGNIFICANT CHANGE UP (ref 27–34)
MCHC RBC-ENTMCNC: 33.5 GM/DL — SIGNIFICANT CHANGE UP (ref 32–36)
MCV RBC AUTO: 85.5 FL — SIGNIFICANT CHANGE UP (ref 80–100)
NRBC # BLD: 0 /100 WBCS — SIGNIFICANT CHANGE UP (ref 0–0)
PLATELET # BLD AUTO: 266 K/UL — SIGNIFICANT CHANGE UP (ref 150–400)
POTASSIUM SERPL-MCNC: 4.1 MMOL/L — SIGNIFICANT CHANGE UP (ref 3.5–5.3)
POTASSIUM SERPL-SCNC: 4.1 MMOL/L — SIGNIFICANT CHANGE UP (ref 3.5–5.3)
PROT SERPL-MCNC: 8.1 G/DL — SIGNIFICANT CHANGE UP (ref 6–8.3)
RBC # BLD: 4.22 M/UL — SIGNIFICANT CHANGE UP (ref 3.8–5.2)
RBC # FLD: 13.3 % — SIGNIFICANT CHANGE UP (ref 10.3–14.5)
SODIUM SERPL-SCNC: 141 MMOL/L — SIGNIFICANT CHANGE UP (ref 135–145)
TROPONIN I SERPL-MCNC: <0.015 NG/ML — SIGNIFICANT CHANGE UP (ref 0–0.04)
WBC # BLD: 9.68 K/UL — SIGNIFICANT CHANGE UP (ref 3.8–10.5)
WBC # FLD AUTO: 9.68 K/UL — SIGNIFICANT CHANGE UP (ref 3.8–10.5)

## 2019-07-17 PROCEDURE — 99284 EMERGENCY DEPT VISIT MOD MDM: CPT

## 2019-07-17 PROCEDURE — 71046 X-RAY EXAM CHEST 2 VIEWS: CPT | Mod: 26

## 2019-07-17 RX ORDER — ACETAMINOPHEN 500 MG
650 TABLET ORAL ONCE
Refills: 0 | Status: COMPLETED | OUTPATIENT
Start: 2019-07-17 | End: 2019-07-17

## 2019-07-17 RX ORDER — SODIUM CHLORIDE 9 MG/ML
1000 INJECTION, SOLUTION INTRAVENOUS ONCE
Refills: 0 | Status: COMPLETED | OUTPATIENT
Start: 2019-07-17 | End: 2019-07-17

## 2019-07-17 RX ORDER — LIDOCAINE 4 G/100G
1 CREAM TOPICAL
Qty: 5 | Refills: 0
Start: 2019-07-17

## 2019-07-17 RX ADMIN — Medication 650 MILLIGRAM(S): at 16:36

## 2019-07-17 RX ADMIN — SODIUM CHLORIDE 1000 MILLILITER(S): 9 INJECTION, SOLUTION INTRAVENOUS at 16:40

## 2019-07-17 NOTE — ED ADULT NURSE NOTE - NSIMPLEMENTINTERV_GEN_ALL_ED
Implemented All Universal Safety Interventions:  [place] to call system. Call bell, personal items and telephone within reach. Instruct patient to call for assistance. Room bathroom lighting operational. Non-slip footwear when patient is off stretcher. Physically safe environment: no spills, clutter or unnecessary equipment. Stretcher in lowest position, wheels locked, appropriate side rails in place.

## 2019-07-17 NOTE — ED PROVIDER NOTE - PROGRESS NOTE DETAILS
MD Soledad:  Pt received in signout to f/u CXR.  CXR wet reading shows no acute process.  I assessed Pt and she has only minimal pain to left shoulder and localized area of left upper chest which is reproducible with palpation and L shoulder movement.  She confirms a negative stress test about 1 year ago.  Advised strict return precautions and PMD, her own cardiologist and own regular rheumatology f/u.

## 2019-07-17 NOTE — ED PROVIDER NOTE - OBJECTIVE STATEMENT
38 y/o F patient with a significant PMHx of lupus and no significant PSHx presents to the ED for chest and arm pain x2 days. Patient reports she has an ulcer in her mouth as well.  Patient states she had diarrhea yesterday and some constipation. Patient denies taking any pain medications, sick contact, recent travel, heavy lifting, rash, fever, nausea, chills, and any other complaints. Allergies: penicillin.

## 2019-07-17 NOTE — ED ADULT NURSE NOTE - CHPI ED NUR SYMPTOMS NEG
no back pain/no chills/no congestion/no diaphoresis/no dizziness/no fever/no nausea/no shortness of breath/no syncope/no vomiting

## 2019-09-12 ENCOUNTER — EMERGENCY (EMERGENCY)
Facility: HOSPITAL | Age: 39
LOS: 1 days | Discharge: HOME | End: 2019-09-12
Attending: EMERGENCY MEDICINE
Payer: MEDICAID

## 2019-09-12 VITALS
TEMPERATURE: 98 F | SYSTOLIC BLOOD PRESSURE: 124 MMHG | RESPIRATION RATE: 19 BRPM | OXYGEN SATURATION: 100 % | DIASTOLIC BLOOD PRESSURE: 74 MMHG | HEART RATE: 76 BPM

## 2019-09-12 VITALS
TEMPERATURE: 98 F | HEART RATE: 82 BPM | OXYGEN SATURATION: 99 % | SYSTOLIC BLOOD PRESSURE: 119 MMHG | DIASTOLIC BLOOD PRESSURE: 79 MMHG | WEIGHT: 128.09 LBS | RESPIRATION RATE: 16 BRPM

## 2019-09-12 LAB
ALBUMIN SERPL ELPH-MCNC: 3.8 G/DL — SIGNIFICANT CHANGE UP (ref 3.5–5)
ALP SERPL-CCNC: 70 U/L — SIGNIFICANT CHANGE UP (ref 40–120)
ALT FLD-CCNC: 21 U/L DA — SIGNIFICANT CHANGE UP (ref 10–60)
ANION GAP SERPL CALC-SCNC: 6 MMOL/L — SIGNIFICANT CHANGE UP (ref 5–17)
AST SERPL-CCNC: 12 U/L — SIGNIFICANT CHANGE UP (ref 10–40)
BILIRUB SERPL-MCNC: 0.1 MG/DL — LOW (ref 0.2–1.2)
BUN SERPL-MCNC: 10 MG/DL — SIGNIFICANT CHANGE UP (ref 7–18)
CALCIUM SERPL-MCNC: 9 MG/DL — SIGNIFICANT CHANGE UP (ref 8.4–10.5)
CHLORIDE SERPL-SCNC: 109 MMOL/L — HIGH (ref 96–108)
CO2 SERPL-SCNC: 24 MMOL/L — SIGNIFICANT CHANGE UP (ref 22–31)
CREAT SERPL-MCNC: 0.72 MG/DL — SIGNIFICANT CHANGE UP (ref 0.5–1.3)
GLUCOSE SERPL-MCNC: 106 MG/DL — HIGH (ref 70–99)
HCG UR QL: NEGATIVE — SIGNIFICANT CHANGE UP
HCT VFR BLD CALC: 36.8 % — SIGNIFICANT CHANGE UP (ref 34.5–45)
HGB BLD-MCNC: 12.1 G/DL — SIGNIFICANT CHANGE UP (ref 11.5–15.5)
MCHC RBC-ENTMCNC: 29.3 PG — SIGNIFICANT CHANGE UP (ref 27–34)
MCHC RBC-ENTMCNC: 32.9 GM/DL — SIGNIFICANT CHANGE UP (ref 32–36)
MCV RBC AUTO: 89.1 FL — SIGNIFICANT CHANGE UP (ref 80–100)
NRBC # BLD: 0 /100 WBCS — SIGNIFICANT CHANGE UP (ref 0–0)
PLATELET # BLD AUTO: 297 K/UL — SIGNIFICANT CHANGE UP (ref 150–400)
POTASSIUM SERPL-MCNC: 3.4 MMOL/L — LOW (ref 3.5–5.3)
POTASSIUM SERPL-SCNC: 3.4 MMOL/L — LOW (ref 3.5–5.3)
PROT SERPL-MCNC: 8.3 G/DL — SIGNIFICANT CHANGE UP (ref 6–8.3)
RBC # BLD: 4.13 M/UL — SIGNIFICANT CHANGE UP (ref 3.8–5.2)
RBC # FLD: 12.3 % — SIGNIFICANT CHANGE UP (ref 10.3–14.5)
SODIUM SERPL-SCNC: 139 MMOL/L — SIGNIFICANT CHANGE UP (ref 135–145)
TROPONIN I SERPL-MCNC: <0.015 NG/ML — SIGNIFICANT CHANGE UP (ref 0–0.04)
WBC # BLD: 11.24 K/UL — HIGH (ref 3.8–10.5)
WBC # FLD AUTO: 11.24 K/UL — HIGH (ref 3.8–10.5)

## 2019-09-12 PROCEDURE — 99284 EMERGENCY DEPT VISIT MOD MDM: CPT

## 2019-09-12 PROCEDURE — 71046 X-RAY EXAM CHEST 2 VIEWS: CPT | Mod: 26

## 2019-09-12 NOTE — ED PROVIDER NOTE - PATIENT PORTAL LINK FT
You can access the FollowMyHealth Patient Portal offered by [facility] by registering at the following website: [email or web address]. By joining [facility] ([facility])’s FollowMyHealth portal, you will also be able to view your health information using other applications (apps) compatible with our system.

## 2019-09-12 NOTE — ED PROVIDER NOTE - OBJECTIVE STATEMENT
39 year old female with PMHx of Lupus, HTN, and Hypothyroidism and no significant PSHx presents to ED with pain to bilateral upper back areas brought on with inspiration since last night. Pt was seen by primary NP in outpatient office and was referred to the ED for evaluation. Pt denies fever, cough, vomiting, diarrhea, chest pain, shortness of breath, or any other acute complaints. Pt also denies any history of DVT, PE, recent travel, or any other heart or lung problems. No ETOH use, no drug use, and non-smoker. Allergic to penicillin (Hives, rash).

## 2019-09-12 NOTE — ED ADULT TRIAGE NOTE - CHIEF COMPLAINT QUOTE
right shoulder and right arm pain x last night., patient complains of chest pain radiating to the back

## 2019-09-12 NOTE — ED PROVIDER NOTE - MUSCULOSKELETAL, MLM
No tenderness to back. No tenderness to CVA. Spine appears normal, range of motion is not limited, no muscle or joint tenderness

## 2019-09-12 NOTE — ED PROVIDER NOTE - CLINICAL SUMMARY MEDICAL DECISION MAKING FREE TEXT BOX
Patient comes in with upper back pain but denies shortness of breath or chest pain. Labs, EKG, and chest x-ray are unremarkable. Return precautions given. Will discharge with primary medical doctor follow up.

## 2019-09-12 NOTE — ED ADULT NURSE NOTE - PMH
Acquired hypothyroidism    Hypertension, unspecified type    Hypothyroidism    Lupus    Lupus (systemic lupus erythematosus)

## 2019-09-29 NOTE — ED ADULT NURSE NOTE - OBJECTIVE STATEMENT
[facility] left chest pain, radiating to the arm and left abdomen for the past 2 days. States feeling weak with episodes of headache. Denies N/V, dizziness.

## 2019-10-01 ENCOUNTER — OUTPATIENT (OUTPATIENT)
Dept: OUTPATIENT SERVICES | Facility: HOSPITAL | Age: 39
LOS: 1 days | End: 2019-10-01
Payer: MEDICAID

## 2019-10-01 PROCEDURE — G9001: CPT

## 2019-10-15 DIAGNOSIS — Z71.89 OTHER SPECIFIED COUNSELING: ICD-10-CM

## 2019-12-18 ENCOUNTER — RESULT REVIEW (OUTPATIENT)
Age: 39
End: 2019-12-18

## 2019-12-30 ENCOUNTER — EMERGENCY (EMERGENCY)
Facility: HOSPITAL | Age: 39
LOS: 1 days | Discharge: HOME | End: 2019-12-30
Attending: EMERGENCY MEDICINE
Payer: MEDICAID

## 2019-12-30 VITALS
OXYGEN SATURATION: 99 % | DIASTOLIC BLOOD PRESSURE: 84 MMHG | HEART RATE: 81 BPM | RESPIRATION RATE: 18 BRPM | SYSTOLIC BLOOD PRESSURE: 132 MMHG | TEMPERATURE: 98 F

## 2019-12-30 VITALS
HEART RATE: 93 BPM | WEIGHT: 123.9 LBS | DIASTOLIC BLOOD PRESSURE: 84 MMHG | RESPIRATION RATE: 18 BRPM | OXYGEN SATURATION: 99 % | SYSTOLIC BLOOD PRESSURE: 144 MMHG | HEIGHT: 68 IN | TEMPERATURE: 99 F

## 2019-12-30 LAB
ALBUMIN SERPL ELPH-MCNC: 4.3 G/DL — SIGNIFICANT CHANGE UP (ref 3.3–5)
ALP SERPL-CCNC: 60 U/L — SIGNIFICANT CHANGE UP (ref 40–120)
ALT FLD-CCNC: 20 U/L — SIGNIFICANT CHANGE UP (ref 10–45)
ANION GAP SERPL CALC-SCNC: 14 MMOL/L — SIGNIFICANT CHANGE UP (ref 5–17)
AST SERPL-CCNC: 21 U/L — SIGNIFICANT CHANGE UP (ref 10–40)
BASOPHILS # BLD AUTO: 0.03 K/UL — SIGNIFICANT CHANGE UP (ref 0–0.2)
BASOPHILS NFR BLD AUTO: 0.4 % — SIGNIFICANT CHANGE UP (ref 0–2)
BILIRUB SERPL-MCNC: 0.3 MG/DL — SIGNIFICANT CHANGE UP (ref 0.2–1.2)
BUN SERPL-MCNC: 6 MG/DL — LOW (ref 7–23)
CALCIUM SERPL-MCNC: 9.1 MG/DL — SIGNIFICANT CHANGE UP (ref 8.4–10.5)
CHLORIDE SERPL-SCNC: 104 MMOL/L — SIGNIFICANT CHANGE UP (ref 96–108)
CO2 SERPL-SCNC: 20 MMOL/L — LOW (ref 22–31)
CREAT SERPL-MCNC: 0.69 MG/DL — SIGNIFICANT CHANGE UP (ref 0.5–1.3)
EOSINOPHIL # BLD AUTO: 0.52 K/UL — HIGH (ref 0–0.5)
EOSINOPHIL NFR BLD AUTO: 6.7 % — HIGH (ref 0–6)
GLUCOSE SERPL-MCNC: 102 MG/DL — HIGH (ref 70–99)
HCT VFR BLD CALC: 36.2 % — SIGNIFICANT CHANGE UP (ref 34.5–45)
HGB BLD-MCNC: 11.9 G/DL — SIGNIFICANT CHANGE UP (ref 11.5–15.5)
IMM GRANULOCYTES NFR BLD AUTO: 0.3 % — SIGNIFICANT CHANGE UP (ref 0–1.5)
LIDOCAIN IGE QN: 20 U/L — SIGNIFICANT CHANGE UP (ref 7–60)
LYMPHOCYTES # BLD AUTO: 2.27 K/UL — SIGNIFICANT CHANGE UP (ref 1–3.3)
LYMPHOCYTES # BLD AUTO: 29.1 % — SIGNIFICANT CHANGE UP (ref 13–44)
MAGNESIUM SERPL-MCNC: 2.2 MG/DL — SIGNIFICANT CHANGE UP (ref 1.6–2.6)
MCHC RBC-ENTMCNC: 27.8 PG — SIGNIFICANT CHANGE UP (ref 27–34)
MCHC RBC-ENTMCNC: 32.9 GM/DL — SIGNIFICANT CHANGE UP (ref 32–36)
MCV RBC AUTO: 84.6 FL — SIGNIFICANT CHANGE UP (ref 80–100)
MONOCYTES # BLD AUTO: 0.43 K/UL — SIGNIFICANT CHANGE UP (ref 0–0.9)
MONOCYTES NFR BLD AUTO: 5.5 % — SIGNIFICANT CHANGE UP (ref 2–14)
NEUTROPHILS # BLD AUTO: 4.53 K/UL — SIGNIFICANT CHANGE UP (ref 1.8–7.4)
NEUTROPHILS NFR BLD AUTO: 58 % — SIGNIFICANT CHANGE UP (ref 43–77)
NRBC # BLD: 0 /100 WBCS — SIGNIFICANT CHANGE UP (ref 0–0)
PHOSPHATE SERPL-MCNC: 3.2 MG/DL — SIGNIFICANT CHANGE UP (ref 2.5–4.5)
PLATELET # BLD AUTO: 218 K/UL — SIGNIFICANT CHANGE UP (ref 150–400)
POTASSIUM SERPL-MCNC: 3.7 MMOL/L — SIGNIFICANT CHANGE UP (ref 3.5–5.3)
POTASSIUM SERPL-SCNC: 3.7 MMOL/L — SIGNIFICANT CHANGE UP (ref 3.5–5.3)
PROT SERPL-MCNC: 7.9 G/DL — SIGNIFICANT CHANGE UP (ref 6–8.3)
RBC # BLD: 4.28 M/UL — SIGNIFICANT CHANGE UP (ref 3.8–5.2)
RBC # FLD: 12.3 % — SIGNIFICANT CHANGE UP (ref 10.3–14.5)
SODIUM SERPL-SCNC: 138 MMOL/L — SIGNIFICANT CHANGE UP (ref 135–145)
WBC # BLD: 7.8 K/UL — SIGNIFICANT CHANGE UP (ref 3.8–10.5)
WBC # FLD AUTO: 7.8 K/UL — SIGNIFICANT CHANGE UP (ref 3.8–10.5)

## 2019-12-30 PROCEDURE — 93005 ELECTROCARDIOGRAM TRACING: CPT

## 2019-12-30 PROCEDURE — 99284 EMERGENCY DEPT VISIT MOD MDM: CPT | Mod: 25

## 2019-12-30 PROCEDURE — 83690 ASSAY OF LIPASE: CPT

## 2019-12-30 PROCEDURE — 96375 TX/PRO/DX INJ NEW DRUG ADDON: CPT

## 2019-12-30 PROCEDURE — 76705 ECHO EXAM OF ABDOMEN: CPT | Mod: 26

## 2019-12-30 PROCEDURE — 85027 COMPLETE CBC AUTOMATED: CPT

## 2019-12-30 PROCEDURE — 84100 ASSAY OF PHOSPHORUS: CPT

## 2019-12-30 PROCEDURE — 80053 COMPREHEN METABOLIC PANEL: CPT

## 2019-12-30 PROCEDURE — 74177 CT ABD & PELVIS W/CONTRAST: CPT | Mod: 26

## 2019-12-30 PROCEDURE — 83735 ASSAY OF MAGNESIUM: CPT

## 2019-12-30 PROCEDURE — 99284 EMERGENCY DEPT VISIT MOD MDM: CPT

## 2019-12-30 PROCEDURE — 96374 THER/PROPH/DIAG INJ IV PUSH: CPT | Mod: XU

## 2019-12-30 PROCEDURE — 76705 ECHO EXAM OF ABDOMEN: CPT

## 2019-12-30 PROCEDURE — 74177 CT ABD & PELVIS W/CONTRAST: CPT

## 2019-12-30 RX ORDER — FAMOTIDINE 10 MG/ML
1 INJECTION INTRAVENOUS
Qty: 21 | Refills: 0
Start: 2019-12-30 | End: 2020-01-19

## 2019-12-30 RX ORDER — FAMOTIDINE 10 MG/ML
20 INJECTION INTRAVENOUS ONCE
Refills: 0 | Status: COMPLETED | OUTPATIENT
Start: 2019-12-30 | End: 2019-12-30

## 2019-12-30 RX ORDER — ONDANSETRON 8 MG/1
4 TABLET, FILM COATED ORAL ONCE
Refills: 0 | Status: COMPLETED | OUTPATIENT
Start: 2019-12-30 | End: 2019-12-30

## 2019-12-30 RX ADMIN — FAMOTIDINE 20 MILLIGRAM(S): 10 INJECTION INTRAVENOUS at 06:16

## 2019-12-30 RX ADMIN — ONDANSETRON 4 MILLIGRAM(S): 8 TABLET, FILM COATED ORAL at 06:16

## 2019-12-30 NOTE — ED PROVIDER NOTE - OBJECTIVE STATEMENT
Attending Gina Campbell: 38 y/o female h/o lupus presenting with abdominal pain. symptoms started december 3rd. went to see her primary care physician who reportedly prescribed gas medication without improvement. pt states has been having pain daily, worse with eating. pain located in epigastric and RUQ pain. does intermittent go into her shoulder. no recent motrin use. no fevers. did report vomiting. had an endoscopy and a colonoscopy performed one year ago. no h/o ETOH use. no h/o ovarian cyst. no pain with inspiration. also reports shoulder pain

## 2019-12-30 NOTE — ED PROVIDER NOTE - NSFOLLOWUPINSTRUCTIONS_ED_ALL_ED_FT
Abdominal pain is a very common reason for people to visit the emergency room. Frequently the exact cause of these painful symptoms is not diagnosed in the ED. Very often, your emergency provider will focus on ruling out a dangerous diagnosis and trying to help you feel better. Luckily most abdominal pain gets better with time and rest. In rare circumstances, abdominal pain can be a sign of a much more severe medical condition.     HOME CARE INSTRUCTIONS   - rest  - drink plenty of non-caffeinated non-alcoholic fluids. Keep in mind salt restrictions if you are eating store bought soups  - avoid excessive motrin / Advil / ibuprofen (NSAID's) as these can make abdominal pain worse. Tylenol is a good choice for pain (make sure that you follow dosage guidelines)  - avoid fatty or spicy foods  - make an appointment to see your doctor  - If you have vomiting or diarrhea - DO NOT go to work while you have these contagious symptoms     RETURN TO THE EMERGENCY ROOM RIGHT AWAY  - IF YOUR PAIN CHANGES OR GETS WORSE  - IF YOU HAVE NAUSEA AND VOMITING THAT PERSISTS AND YOU CANNOT KEEP DOWN SOLIDS OR LIQUIDS  - YOUR PAIN RADIATES TO YOUR BACK  - YOU HAVE CHEST PAIN, SHORTNESS OF BREATH OR DIZZINESS  - IF YOU FEEL DIZZY OR WEAK  - IF YOU HAVE CHEST PAIN  - YOU HAVE BLEEDING FROM YOUR RECTUM OR DARK OR TARRY/STICKY STOOLS  - YOU HAVE BLEEDING FROM YOUR PENIS OR VAGINA OR BLOOD IN YOUR URINE  - YOUR PAIN IS NOT IMPROVING IN 24 HOURS  - YOU FEEL SICK OR HAVE CONCERNS

## 2019-12-30 NOTE — ED PROVIDER NOTE - PROGRESS NOTE DETAILS
Dr. Anil Ambrose, PGY-2: bedside sono w/ unremarkable GB. Awaiting CT scan to be performed Sasha Alatorre MD pt signed out to me pending results of ct scan, no significant abnormality to account for patients abdominal pain, will have outpt follow up with GI

## 2019-12-30 NOTE — ED PROVIDER NOTE - CLINICAL SUMMARY MEDICAL DECISION MAKING FREE TEXT BOX
Attending Gina Campbell: 38 y/o female presenting with abdominal pain for last month. upon arrival pt with ttp epigastric area. pt had prior endoscopy and colonoscopy that were negative. pocus performed showing no evidence of biliary disease. ct performed which was negative for acute surgical pathology. labs unremarkable. consider possible GI source. no pleuritic pain or risk factors for PE. less likely acs. likely d/c with GI follow up

## 2019-12-30 NOTE — ED PROVIDER NOTE - PATIENT PORTAL LINK FT
You can access the FollowMyHealth Patient Portal offered by [facility] by registering at the following website: [email or web address]. By joining [employer]’s FollowMyHealth portal, you will also be able to view your health information using other applications (apps) compatible with our system.

## 2019-12-30 NOTE — ED PROVIDER NOTE - ATTENDING CONTRIBUTION TO CARE
Attending MD Gina Campbell:  I personally have seen and examined this patient.  Resident note reviewed and agree on plan of care and except where noted.  See HPI, PE, and MDM for details.

## 2019-12-30 NOTE — ED ADULT NURSE NOTE - OBJECTIVE STATEMENT
Patient is a 39 year old female complaining of abdominal pain since 12/3/19. Arrived by walk-in. Patient has history of lupus. Patient is A&O x 4 and appears well, ambulatory independently. Pt reports she was following issue with PMD who prescribed her gas medication with no improvement. Endorsing complaints of RUQ and epigastric pain that radiates to shoulders. Also has had few episodes of emesis. Denies complaints of chest pain, sob, fevers, chills, diarrhea, headache, syncope, burning urination, blood in urine, blood in stool. Abd is soft, non tender, non distended. Skin is warm and dry. Color is consistent with ethnicity. VSS/ NAD. Safety and comfort maintained.  at the bedside. Will continue to monitor.

## 2019-12-30 NOTE — ED PROVIDER NOTE - PHYSICAL EXAMINATION
Attending Gina Campbell: Gen: NAD, heent: atrauamtic, eomi, perrla, mmm, op pink, uvula midline, neck; nttp, no nuchal rigidity, chest: nttp, no crepitus, cv: rrr, no murmurs, lungs: ctab, abd: soft, ttp RUQ and epigastric area, no peritoneal signs, +BS, no guarding, ext: wwp, neg homans, skin: no rash, neuro: awake and alert, following commands, speech clear, sensation and strength intact, no focal deficits

## 2019-12-30 NOTE — ED PROVIDER NOTE - NSFOLLOWUPCLINICS_GEN_ALL_ED_FT
[facility]  Gastroenterology  [address] [place] [id number]  Phone: [phone]  Fax:   Follow Up Time: 4-6 Days

## 2020-07-20 ENCOUNTER — APPOINTMENT (OUTPATIENT)
Dept: ORTHOPEDIC SURGERY | Facility: CLINIC | Age: 40
End: 2020-07-20

## 2020-07-21 ENCOUNTER — APPOINTMENT (OUTPATIENT)
Dept: ORTHOPEDIC SURGERY | Facility: CLINIC | Age: 40
End: 2020-07-21
Payer: COMMERCIAL

## 2020-07-21 VITALS
HEIGHT: 60 IN | DIASTOLIC BLOOD PRESSURE: 78 MMHG | BODY MASS INDEX: 23.56 KG/M2 | WEIGHT: 120 LBS | SYSTOLIC BLOOD PRESSURE: 123 MMHG

## 2020-07-21 DIAGNOSIS — M75.02 ADHESIVE CAPSULITIS OF LEFT SHOULDER: ICD-10-CM

## 2020-07-21 PROCEDURE — 99204 OFFICE O/P NEW MOD 45 MIN: CPT | Mod: 25

## 2020-07-21 PROCEDURE — 20610 DRAIN/INJ JOINT/BURSA W/O US: CPT | Mod: LT

## 2020-07-21 RX ORDER — FAMOTIDINE 20 MG/1
20 TABLET, FILM COATED ORAL
Qty: 180 | Refills: 0 | Status: ACTIVE | COMMUNITY
Start: 2019-12-10

## 2020-07-21 RX ORDER — IBUPROFEN 600 MG/1
600 TABLET, FILM COATED ORAL
Qty: 30 | Refills: 0 | Status: ACTIVE | COMMUNITY
Start: 2020-06-08

## 2020-07-21 RX ORDER — MEDROXYPROGESTERONE ACETATE 10 MG/1
10 TABLET ORAL
Qty: 10 | Refills: 0 | Status: ACTIVE | COMMUNITY
Start: 2020-04-28

## 2020-07-21 RX ORDER — FLUTICASONE PROPIONATE 0.05 MG/G
0.01 OINTMENT TOPICAL
Qty: 60 | Refills: 0 | Status: ACTIVE | COMMUNITY
Start: 2020-02-03

## 2020-07-21 RX ORDER — CIPROFLOXACIN HYDROCHLORIDE 250 MG/1
250 TABLET, FILM COATED ORAL
Qty: 6 | Refills: 0 | Status: ACTIVE | COMMUNITY
Start: 2020-05-26

## 2020-07-21 RX ORDER — POLYETHYLENE GLYCOL 3350 17 G/17G
17 POWDER, FOR SOLUTION ORAL
Qty: 90 | Refills: 0 | Status: ACTIVE | COMMUNITY
Start: 2020-01-27

## 2020-07-21 RX ORDER — SENNOSIDES 8.6 MG TABLETS 8.6 MG/1
8.6 TABLET ORAL
Refills: 0 | Status: ACTIVE | COMMUNITY

## 2020-07-21 RX ORDER — OMEPRAZOLE 40 MG/1
40 CAPSULE, DELAYED RELEASE ORAL
Qty: 90 | Refills: 0 | Status: ACTIVE | COMMUNITY
Start: 2020-03-09

## 2020-07-21 RX ORDER — DICLOFENAC SODIUM 20 MG/G
2 SOLUTION TOPICAL
Qty: 1 | Refills: 0 | Status: ACTIVE | COMMUNITY
Start: 2020-07-21 | End: 1900-01-01

## 2020-07-21 RX ORDER — ENALAPRIL MALEATE 2.5 MG/1
2.5 TABLET ORAL
Qty: 90 | Refills: 0 | Status: ACTIVE | COMMUNITY
Start: 2020-04-01

## 2020-07-21 RX ORDER — DESOXIMETASONE 2.5 MG/G
0.25 CREAM TOPICAL
Qty: 100 | Refills: 0 | Status: ACTIVE | COMMUNITY
Start: 2020-01-13

## 2020-07-21 RX ORDER — METHYLPRED ACET/NACL,ISO-OS/PF 40 MG/ML
40 VIAL (ML) INJECTION
Qty: 1 | Refills: 0 | Status: ACTIVE | COMMUNITY
Start: 2020-06-15

## 2020-07-21 RX ORDER — METHYLPREDNISOLONE 4 MG/1
4 TABLET ORAL
Qty: 21 | Refills: 0 | Status: ACTIVE | COMMUNITY
Start: 2020-05-19

## 2020-07-21 NOTE — PHYSICAL EXAM
[de-identified] : Physical Examination\par General: well nourished, in no acute distress, alert and oriented to person, place and time\par Psychiatric: normal mood and affect, no abnormal movements or speech patterns\par Eyes: vision intact - glasses\par Throat: no thyromegaly\par Lymph: no enlarged nodes, no lymphedema in extremity\par Respiratory: no wheezing, no shortness of breath with ambulation\par Cardiac: no cardiac leg swelling, 2+ peripheral pulses\par Neurology: normal gross sensation in extremities to light touch\par Abdomen: soft, non-tender, tympanic, no masses\par \par Musculoskeletal Examination\par Cervical spine	Full painless range of motion and negative Spurling's test\par \par Shoulder			Right			Left\par Appearance\par      Skin/Swelling/Deformity	normal			normal\par      Scapular Winging		-			-\par Range of Motion\par      Forward Flexion		170 / 170		100 / 100\par      Abduction			170 / 170		70 / 70\par      External Rotation		60			15\par      Internal Rotation		T10			post hip\par      SAbd Ext Rotation		90			90&\par      SAbd Int Rotation		80			80&\par      Painful Arc			-			+\par      Crepitus			-			-\par Palpation\par      Clavicle			-			-\par      AC Joint			-			-\par      Posterior Acromion		-			+\par      Levator Scapula		-			-\par      Lateral Bursa			-			+\par      Impingement Area		-			+\par      Biceps Tendon		-			+\par      Anterior Capsule		-			+\par Strength Examination\par      Supraspinatous 		5+ / 0			5+ / +\par      Infraspinatous			5+ / 0			5+ / +\par      Subscapularis			5+ / 0			5+ / +\par      Belly Press			5+ / 0			5+ / +\par      Lift Off			-			-&\par      Drop-Arm			-			-\par Special Examination\par      Biceps Marin's		-			-&\par      Impingement Neer		-			-&\par      Impingement Hawking		-			-&\par \par \par Sensation\par      Axillary			normal			normal\par      LatAntCubBrach 		normal			normal\par      Median 			normal			normal\par      Ulnar 			normal			normal\par      Radial 			normal			normal\par Motor\par      AIN 				normal			normal\par      Ulnar 			normal			normal\par      Radial 			normal			normal\par      PIN 				normal			normal\par Pulses\par      Radial			2+			2+ [de-identified] : 4 views of the affected Left shoulder (AP, Glenoid, Y-View, Axillary)\par were ordered, obtained and evaluated by myself today and\par demonstrate:\par normal bony calcification without dislocation and no fracture\par 	Arch	2B\par 	AC Joint	no Arthrosis\par 	GH Joint	no Arthrosis\par 	Calcifications	none, undersurface calcification of the CC ligament

## 2020-07-21 NOTE — HISTORY OF PRESENT ILLNESS
[de-identified] : CC Left shoulder\par \par HPI 39 yo female right HD presents with the onset of 6 months of activity related and constant pain in the entire Left shoulder [without injury]. The pain is worse, and rated a 10 out of 10, described as dull sharp ache, [without radiation]. Absolutely nothing makes the pain better and everything makes the pain worse. The patient reports associated symptoms of loss of motion. The patient + pain at night affecting sleep, - neck pain, and - similar pain previously.\par \par The patient has tried the following treatments:\par Activity modification	+\par Ice			+\par Nsaids    		+\par Physical Therapy  	+ improve motion\par Cortisone Injection	+ no improvement even short term for a few days\par Arthroscopy/Surgery	-\par \par Review of Systems is positive for the above musculoskeletal symptoms and is otherwise non-contributory for general, constitutional, psychiatric, neurologic, HEENT, cardiac, respiratory, gastrointestinal, reproductive, lymphatic, and dermatologic complaints.\par \par Consult by Dr. Checo Wiseman

## 2020-07-21 NOTE — DISCUSSION/SUMMARY
[de-identified] : Left shoulder adhesive capsulitis\par \par I discussed the etiology, pathology and expected natural course of the frozen shoulder with sudden with minimal trauma onset of worsening pain in the shoulder and progressive loss of range of motion. This is a result of a sudden inflammatory response within the capsule of the joint which stimulates inflammation and severe pain within the shoulder as experienced by the patient and causes thickening of the joint capsule. This inflammatory thickening of the joint capsule will progress to worsening loss of range of motion of the shoulder with continued worsening of the inflammatory pain. Eventually the inflammatory process burns itself out and inflammation resolves and the pain improves.  However the shoulder remains "frozen" with decreased range of motion due to the scarred and thickened joint capsule. With with time and physical therapy and motion of the shoulder the thickened and scarred joint capsule will begin to loosen and the patient will feel continued improvement of the symptoms in the shoulder with improved motion over time.\par \par The entire course of this disease can take 1-2 years to fully resolve in patients without diabetes. In patients with diabetes is problem can take 2-3 years to resolve with more severe pain and loss of motion in the interim and less complete resolution of pain and loss of motion at the end of the disease process.\par \par The patient was prescribed Diclofenac topical liquid/creme non-steroidal anti-inflammatory medication. 1-2 pumps twice daily and apply to area with pain. There is low systemic absorption of the medication but risks while reduced remain were discussed and include but not limited to renal damage and GI ulceration and bleeding. They were warned to stop the medication if worsening buring skin or gastric pain or dizziness or other side effects. Also to immediately stop the medication and seek appropriate medical attention if any severe stomach ache, gastritis, black/red vomit, black/red stools or any other medical concern.\par \par \par The treatment consists largely of non-operative management including, patient education, activity modification, maintaining ROM of the shoulder w exercises and physical therapy, addressing the inflammation with anti-inflammatory medications consisting of oral non-steroidal anti-inflammatory medications and an intra-articular/subacromial bursal sorticosteroid injection.\par \par Physical therapy prescription was provided\par \par Procedure Note:\par \par Risks and benefits of an arthrocentesis and intraarticular corticosteroid injection of the LEFT shoulder glenohumeral joint were discussed with the patient. Potential adverse effects were discussed including but not limited to bleeding, skin/ joint infection, local skin reactions including bleaching, bruising, stiffness, soreness, vasovagal episodes, transient hyperglycemia, avascular necrosis, pseudo-septic type reactions, post injection joint pain, allergic reaction to product or anesthetic and other rare but potential adverse effects along with benefits including decreased pain and improved stability prior to obtaining verbal informed consent. It was also discussed that for some patients the treatment is ineffective and there are no guarantees that the patient will experience improvement as the result of the injection. In rare occasions the injection can cause worsening of pain.\par \par After verbal consent, the nevaiser injection site was identified after palpating the soft spot based on the junction of the bony landmarks of the posterior clavicle, medial acromion and the AC joint. The injection site was marked and prepped with a ChloraPrep swab and anesthetized with ethylchloride skin anesthesia. Under sterile technique a 22g 1 1/2 in needle with 3 cc total of 1cc 1% lidocaine without epinephrine, 1cc 0.25% Marcaine without epinephrine and 1cc of Kenalog 40mg/cc was passed through the injection site towards the glenohumeral joint. The medication was injected without resistance. The injection site was sterilely dressed, there was minimal blood loss. The patient tolerated this procedure without any complications done by myself.\par \par The patient has been advised that if they notice any worsening of symptoms or any problems to contact me and seek care from a qualified medical professional. The patient was instructed to ice the shoulder and take NSAID medication on an as needed basis if the patient feels discomfort.\par \par \par The patient verifies their understanding the the visit, diagnosis and plan. They agree with the treatment plan and will contact the office with any questions or problems.\par \par Followup p.r.n.

## 2020-10-15 ENCOUNTER — RESULT REVIEW (OUTPATIENT)
Age: 40
End: 2020-10-15

## 2021-02-14 NOTE — PROVIDER CONTACT NOTE (OTHER) - DATE AND TIME:
03-Dec-2018 00:32 lying b/p 117/79  pulse 98   sitting b/p 135/83 pulse 94    standing b/p 148/85 pulse 97

## 2021-05-06 NOTE — DISCHARGE NOTE ADULT - CARE PROVIDER_API CALL
[facility]  Speech Language Pathology  Pediatric Daily Note    Asa Player  : 2020     Date: 2021      Visit Information:  SLP Insurance Information: [employer]  Total # of Visits Approved: (BMN)  Total # of Visits to Date: 16  No Show: 0  Canceled Appointment: 2    Next Progress Note Due: May 2021       Interventions used this date:   Caregiver education and Early Language      Subjective: Patient seen by covering SLP this date. Patient running a few minutes late this date, patient did not attend physical therapy this date. Mother present throughout duration of session. Mother reports Duc Yañez has been wearing his hearing aid for longer stretches of time. Hearing aid not present this date due to running late. Behavior:  Alert, Pleasant and Distractible    Objective/Assessment:   Patient progressing towards goals:    1. Within three months, Duc Yañez will imitate actions during play in 4/5 opportunities given faded model and min cues in order to develop social language skills to prevent social isolation skills and develop imitation skills necessary for verbal speech production.   Popping bubbles 2/5 given faded model     2. Within three months, Duc Yañez will request \"more\" via ASL in 4/5 opportunities given model and tactile cues in order to communicate his wants and needs with familiar and unfamiliar listeners. \"more\" in 9/10 opportunities given model during bubble play  \"more\" independently x4 this date when switching toys this date. 3. Within three months, Duc Yañez will request \"all done\" via ASL in 3/4 opportunities given model and tactile cues in order to communicate his wants and needs with familiar and unfamiliar listeners. \"all done\" in 3/4 opportunities given hand over hand assist     4.  Within three months, Duc Yañez will imitate actions during song play in 2/3 opportunities given model and mod cues in order to develop social language skills to prevent social isolation skills and develop imitation skills necessary for verbal speech production.   During music play, patient observed to activate switch and then rock back and forth to the music x3    5. Within three months, Justin Officer will imitate waving in 2/2 opportunities given model and tactile cues in order to develop social language skills to prevent social isolation skills and develop imitation skills necessary for verbal speech production.   Waving in 1/2 opportunities given hand over hand assist      6. Within three months, Justin Officer will point to a preferred item to make a request in 3/5 opportunities given tactile cues in order to communicate his wants and needs with familiar and unfamiliar listeners. Not addressed this date    Pain Assessment:  Initial Assessment: Patient did not appear in pain          [x]         []         []           []          []          []    Re-Assessment: Patient did not appear in pain          [x]         []         []           []          []          []      Plan:  Continue with current goals    Patient/Caregiver Education:  Home Programming: greetings/farewells  Patient/Caregiver educated on session. Patient/Caregiver stated verbal understanding of directions. Time in: 10:07 AM   Time out: 10:27 AM   Minutes seen: 21 minutes   Patient seen upon arrival  Session ended 5 minutes early secondary to COVID 19 cleaning procedures.        Signature: Electronically signed by JAYY Lacy on 5/6/2021 at 10:35 AM Rodri Olmedo), Internal Medicine; Rheumatology  [address] [place] [address]  Phone: [phone]  Fax: [phone]

## 2021-08-04 ENCOUNTER — OUTPATIENT (OUTPATIENT)
Dept: OUTPATIENT SERVICES | Facility: HOSPITAL | Age: 41
LOS: 1 days | End: 2021-08-04
Payer: COMMERCIAL

## 2021-08-04 VITALS
OXYGEN SATURATION: 99 % | HEIGHT: 60 IN | DIASTOLIC BLOOD PRESSURE: 84 MMHG | HEART RATE: 83 BPM | TEMPERATURE: 99 F | SYSTOLIC BLOOD PRESSURE: 126 MMHG | WEIGHT: 132.06 LBS | RESPIRATION RATE: 17 BRPM

## 2021-08-04 DIAGNOSIS — Z78.9 OTHER SPECIFIED HEALTH STATUS: Chronic | ICD-10-CM

## 2021-08-04 DIAGNOSIS — I10 ESSENTIAL (PRIMARY) HYPERTENSION: ICD-10-CM

## 2021-08-04 DIAGNOSIS — M32.9 SYSTEMIC LUPUS ERYTHEMATOSUS, UNSPECIFIED: ICD-10-CM

## 2021-08-04 DIAGNOSIS — Z78.9 OTHER SPECIFIED HEALTH STATUS: ICD-10-CM

## 2021-08-04 DIAGNOSIS — Z29.9 ENCOUNTER FOR PROPHYLACTIC MEASURES, UNSPECIFIED: ICD-10-CM

## 2021-08-04 DIAGNOSIS — Z01.818 ENCOUNTER FOR OTHER PREPROCEDURAL EXAMINATION: ICD-10-CM

## 2021-08-04 DIAGNOSIS — N93.9 ABNORMAL UTERINE AND VAGINAL BLEEDING, UNSPECIFIED: ICD-10-CM

## 2021-08-04 DIAGNOSIS — E11.9 TYPE 2 DIABETES MELLITUS WITHOUT COMPLICATIONS: Chronic | ICD-10-CM

## 2021-08-04 DIAGNOSIS — E03.9 HYPOTHYROIDISM, UNSPECIFIED: ICD-10-CM

## 2021-08-04 LAB
A1C WITH ESTIMATED AVERAGE GLUCOSE RESULT: 5.5 % — SIGNIFICANT CHANGE UP (ref 4–5.6)
BLD GP AB SCN SERPL QL: SIGNIFICANT CHANGE UP
ESTIMATED AVERAGE GLUCOSE: 111 MG/DL — SIGNIFICANT CHANGE UP (ref 68–114)
HCG SERPL-ACNC: 1 MIU/ML — SIGNIFICANT CHANGE UP
TSH SERPL-MCNC: 1.51 UU/ML — SIGNIFICANT CHANGE UP (ref 0.34–4.82)

## 2021-08-04 PROCEDURE — 84443 ASSAY THYROID STIM HORMONE: CPT

## 2021-08-04 PROCEDURE — 36415 COLL VENOUS BLD VENIPUNCTURE: CPT

## 2021-08-04 PROCEDURE — 84702 CHORIONIC GONADOTROPIN TEST: CPT

## 2021-08-04 PROCEDURE — 86900 BLOOD TYPING SEROLOGIC ABO: CPT

## 2021-08-04 PROCEDURE — G0463: CPT

## 2021-08-04 PROCEDURE — 86901 BLOOD TYPING SEROLOGIC RH(D): CPT

## 2021-08-04 PROCEDURE — 83036 HEMOGLOBIN GLYCOSYLATED A1C: CPT

## 2021-08-04 PROCEDURE — 86850 RBC ANTIBODY SCREEN: CPT

## 2021-08-04 RX ORDER — SODIUM CHLORIDE 9 MG/ML
3 INJECTION INTRAMUSCULAR; INTRAVENOUS; SUBCUTANEOUS EVERY 8 HOURS
Refills: 0 | Status: DISCONTINUED | OUTPATIENT
Start: 2021-08-18 | End: 2021-08-18

## 2021-08-04 RX ORDER — CHLORHEXIDINE GLUCONATE 213 G/1000ML
1 SOLUTION TOPICAL ONCE
Refills: 0 | Status: COMPLETED | OUTPATIENT
Start: 2021-08-18 | End: 2021-08-18

## 2021-08-04 NOTE — H&P PST ADULT - NSICDXPASTMEDICALHX_GEN_ALL_CORE_FT
PAST MEDICAL HISTORY:  Abnormal uterine and vaginal bleeding, unspecified     Discoid lupus     HTN (hypertension)     Hypothyroidism

## 2021-08-04 NOTE — H&P PST ADULT - NSICDXFAMILYHX_GEN_ALL_CORE_FT
FAMILY HISTORY:  Father  Still living? Yes, Estimated age: 69  Family history of diabetes mellitus (DM), Age at diagnosis: Age Unknown  FH: HTN (hypertension), Age at diagnosis: Age Unknown    Mother  Still living? Yes, Estimated age: 61  Family history of diabetes mellitus (DM), Age at diagnosis: Age Unknown

## 2021-08-04 NOTE — H&P PST ADULT - NSICDXPROBLEM_GEN_ALL_CORE_FT
PROBLEM DIAGNOSES  Problem: Need for prophylactic measure  Assessment and Plan: instructed to wash with chlorhexidine day of surgery    Problem: Hypothyroid  Assessment and Plan: pt instructed to take med am of surgery    Problem: HTN (hypertension)  Assessment and Plan: pt to take med am of surgery    Problem: Language barrier  Assessment and Plan: provide  for pt    Problem: Systemic lupus  Assessment and Plan: pt to take med am of surgery

## 2021-08-04 NOTE — H&P PST ADULT - HISTORY OF PRESENT ILLNESS
41 yr old [place] woman accompanied by her  (Nahum) provided history. Pt has lupus, Continue Synthroid and take with sips of water on day of surgery. Follow-up with provider postop for management and HTN. Pt had normal menstrual cycles 28 days up until about 1 year ago. Under the care of gyn been monitor, pt had office procedure no results. Pt's hormones were a bit elevated was tried on birth control with little improvement. Pt now stated her cycle started on JUNE 19 and lasted almost 40 days. No transfusion or iron was needed. Pt schedule for total laparoscopic hysterectomy bilateral salpingectomy on 8/18/2021. Pt also instructed on the use of chlorhexidine wash day of surgery written instructions were provided for .

## 2021-08-04 NOTE — H&P PST ADULT - ASSESSMENT
41 yr old female with history of HTN, Continue Synthroid and take with sips of water on day of surgery. Follow-up with provider postop for management., Lupus presents with abnormal uterine and vaginal bleeding, unspecified Pt schedule for total laparoscopic hysterectomy bilateral salpingectomy on 8/18/2021.

## 2021-08-17 ENCOUNTER — TRANSCRIPTION ENCOUNTER (OUTPATIENT)
Age: 41
End: 2021-08-17

## 2021-08-18 ENCOUNTER — RESULT REVIEW (OUTPATIENT)
Age: 41
End: 2021-08-18

## 2021-08-18 ENCOUNTER — INPATIENT (INPATIENT)
Facility: HOSPITAL | Age: 41
LOS: 0 days | Discharge: HOME | DRG: 743 | End: 2021-08-19
Attending: OBSTETRICS & GYNECOLOGY | Admitting: OBSTETRICS & GYNECOLOGY
Payer: COMMERCIAL

## 2021-08-18 VITALS
RESPIRATION RATE: 16 BRPM | DIASTOLIC BLOOD PRESSURE: 63 MMHG | WEIGHT: 132.06 LBS | HEIGHT: 60 IN | TEMPERATURE: 100 F | OXYGEN SATURATION: 100 % | HEART RATE: 85 BPM | SYSTOLIC BLOOD PRESSURE: 110 MMHG

## 2021-08-18 DIAGNOSIS — Z78.9 OTHER SPECIFIED HEALTH STATUS: Chronic | ICD-10-CM

## 2021-08-18 DIAGNOSIS — N93.9 ABNORMAL UTERINE AND VAGINAL BLEEDING, UNSPECIFIED: ICD-10-CM

## 2021-08-18 LAB
APPEARANCE UR: CLEAR — SIGNIFICANT CHANGE UP
BACTERIA # UR AUTO: ABNORMAL /HPF
BILIRUB UR-MCNC: NEGATIVE — SIGNIFICANT CHANGE UP
BLD GP AB SCN SERPL QL: SIGNIFICANT CHANGE UP
COLOR SPEC: YELLOW — SIGNIFICANT CHANGE UP
DIFF PNL FLD: ABNORMAL
EPI CELLS # UR: SIGNIFICANT CHANGE UP /HPF
GLUCOSE BLDC GLUCOMTR-MCNC: 121 MG/DL — HIGH (ref 70–99)
GLUCOSE BLDC GLUCOMTR-MCNC: 152 MG/DL — HIGH (ref 70–99)
GLUCOSE UR QL: NEGATIVE — SIGNIFICANT CHANGE UP
HCG UR QL: NEGATIVE — SIGNIFICANT CHANGE UP
KETONES UR-MCNC: NEGATIVE — SIGNIFICANT CHANGE UP
LEUKOCYTE ESTERASE UR-ACNC: NEGATIVE — SIGNIFICANT CHANGE UP
NITRITE UR-MCNC: NEGATIVE — SIGNIFICANT CHANGE UP
PH UR: 7 — SIGNIFICANT CHANGE UP (ref 5–8)
PROT UR-MCNC: NEGATIVE — SIGNIFICANT CHANGE UP
RBC CASTS # UR COMP ASSIST: ABNORMAL /HPF (ref 0–2)
SP GR SPEC: 1.01 — SIGNIFICANT CHANGE UP (ref 1.01–1.02)
UROBILINOGEN FLD QL: NEGATIVE — SIGNIFICANT CHANGE UP
WBC UR QL: SIGNIFICANT CHANGE UP /HPF (ref 0–5)

## 2021-08-18 PROCEDURE — 88307 TISSUE EXAM BY PATHOLOGIST: CPT | Mod: 26

## 2021-08-18 RX ORDER — HYDROMORPHONE HYDROCHLORIDE 2 MG/ML
0.5 INJECTION INTRAMUSCULAR; INTRAVENOUS; SUBCUTANEOUS
Refills: 0 | Status: DISCONTINUED | OUTPATIENT
Start: 2021-08-18 | End: 2021-08-18

## 2021-08-18 RX ORDER — ZOLPIDEM TARTRATE 10 MG/1
5 TABLET ORAL AT BEDTIME
Refills: 0 | Status: DISCONTINUED | OUTPATIENT
Start: 2021-08-18 | End: 2021-08-19

## 2021-08-18 RX ORDER — BENZOCAINE AND MENTHOL 5; 1 G/100ML; G/100ML
1 LIQUID ORAL
Refills: 0 | Status: DISCONTINUED | OUTPATIENT
Start: 2021-08-18 | End: 2021-08-19

## 2021-08-18 RX ORDER — KETOROLAC TROMETHAMINE 30 MG/ML
30 SYRINGE (ML) INJECTION EVERY 8 HOURS
Refills: 0 | Status: DISCONTINUED | OUTPATIENT
Start: 2021-08-18 | End: 2021-08-19

## 2021-08-18 RX ORDER — MORPHINE SULFATE 50 MG/1
4 CAPSULE, EXTENDED RELEASE ORAL EVERY 4 HOURS
Refills: 0 | Status: DISCONTINUED | OUTPATIENT
Start: 2021-08-18 | End: 2021-08-19

## 2021-08-18 RX ORDER — HYDROXYCHLOROQUINE SULFATE 200 MG
200 TABLET ORAL DAILY
Refills: 0 | Status: DISCONTINUED | OUTPATIENT
Start: 2021-08-19 | End: 2021-08-19

## 2021-08-18 RX ORDER — FAMOTIDINE 10 MG/ML
20 INJECTION INTRAVENOUS
Refills: 0 | Status: DISCONTINUED | OUTPATIENT
Start: 2021-08-18 | End: 2021-08-19

## 2021-08-18 RX ORDER — SODIUM CHLORIDE 9 MG/ML
1000 INJECTION, SOLUTION INTRAVENOUS
Refills: 0 | Status: DISCONTINUED | OUTPATIENT
Start: 2021-08-18 | End: 2021-08-18

## 2021-08-18 RX ORDER — ONDANSETRON 8 MG/1
4 TABLET, FILM COATED ORAL ONCE
Refills: 0 | Status: DISCONTINUED | OUTPATIENT
Start: 2021-08-18 | End: 2021-08-18

## 2021-08-18 RX ORDER — ONDANSETRON 8 MG/1
8 TABLET, FILM COATED ORAL EVERY 8 HOURS
Refills: 0 | Status: DISCONTINUED | OUTPATIENT
Start: 2021-08-18 | End: 2021-08-19

## 2021-08-18 RX ORDER — ACETAMINOPHEN 500 MG
1000 TABLET ORAL ONCE
Refills: 0 | Status: COMPLETED | OUTPATIENT
Start: 2021-08-18 | End: 2021-08-18

## 2021-08-18 RX ORDER — LEVOTHYROXINE SODIUM 125 MCG
50 TABLET ORAL DAILY
Refills: 0 | Status: DISCONTINUED | OUTPATIENT
Start: 2021-08-18 | End: 2021-08-19

## 2021-08-18 RX ORDER — SIMETHICONE 80 MG/1
80 TABLET, CHEWABLE ORAL EVERY 6 HOURS
Refills: 0 | Status: DISCONTINUED | OUTPATIENT
Start: 2021-08-18 | End: 2021-08-19

## 2021-08-18 RX ORDER — HEPARIN SODIUM 5000 [USP'U]/ML
5000 INJECTION INTRAVENOUS; SUBCUTANEOUS EVERY 12 HOURS
Refills: 0 | Status: DISCONTINUED | OUTPATIENT
Start: 2021-08-18 | End: 2021-08-19

## 2021-08-18 RX ADMIN — Medication 30 MILLIGRAM(S): at 13:41

## 2021-08-18 RX ADMIN — HEPARIN SODIUM 5000 UNIT(S): 5000 INJECTION INTRAVENOUS; SUBCUTANEOUS at 21:30

## 2021-08-18 RX ADMIN — Medication 30 MILLIGRAM(S): at 22:00

## 2021-08-18 RX ADMIN — Medication 400 MILLIGRAM(S): at 17:48

## 2021-08-18 RX ADMIN — Medication 30 MILLIGRAM(S): at 21:30

## 2021-08-18 RX ADMIN — SODIUM CHLORIDE 3 MILLILITER(S): 9 INJECTION INTRAMUSCULAR; INTRAVENOUS; SUBCUTANEOUS at 06:56

## 2021-08-18 RX ADMIN — HYDROMORPHONE HYDROCHLORIDE 0.5 MILLIGRAM(S): 2 INJECTION INTRAMUSCULAR; INTRAVENOUS; SUBCUTANEOUS at 10:52

## 2021-08-18 RX ADMIN — CHLORHEXIDINE GLUCONATE 1 APPLICATION(S): 213 SOLUTION TOPICAL at 06:57

## 2021-08-18 RX ADMIN — FAMOTIDINE 20 MILLIGRAM(S): 10 INJECTION INTRAVENOUS at 17:50

## 2021-08-18 RX ADMIN — HYDROMORPHONE HYDROCHLORIDE 0.5 MILLIGRAM(S): 2 INJECTION INTRAMUSCULAR; INTRAVENOUS; SUBCUTANEOUS at 11:22

## 2021-08-18 RX ADMIN — BENZOCAINE AND MENTHOL 1 LOZENGE: 5; 1 LIQUID ORAL at 22:49

## 2021-08-18 RX ADMIN — HYDROMORPHONE HYDROCHLORIDE 0.5 MILLIGRAM(S): 2 INJECTION INTRAMUSCULAR; INTRAVENOUS; SUBCUTANEOUS at 11:07

## 2021-08-18 RX ADMIN — Medication 30 MILLIGRAM(S): at 13:11

## 2021-08-18 RX ADMIN — Medication 1000 MILLIGRAM(S): at 18:18

## 2021-08-18 RX ADMIN — ONDANSETRON 8 MILLIGRAM(S): 8 TABLET, FILM COATED ORAL at 13:13

## 2021-08-18 NOTE — BRIEF OPERATIVE NOTE - NSICDXBRIEFPROCEDURE_GEN_ALL_CORE_FT
PROCEDURES:  Laparoscopic hysterectomy, total, with salpingectomy, uterus 250 g or less 18-Aug-2021 10:24:28  Josh Mcleod

## 2021-08-18 NOTE — CHART NOTE - NSCHARTNOTEFT_GEN_A_CORE
OB PA POST OP NOTE    Pt seen at bedside offers no complaints. Denies n/v, CP; SOB; palpitations; or dizziness. offers no acute at this time,     T(C): 36.4 (08-18-21 @ 12:21), Max: 37.6 (08-18-21 @ 06:57)  HR: 82 (08-18-21 @ 12:21) (82 - 94)  BP: 114/78 (08-18-21 @ 12:21) (100/61 - 116/64)  RR: 18 (08-18-21 @ 12:21) (16 - 22)  SpO2: 100% (08-18-21 @ 12:21) (100% - 100%)    abd: soft/nt/nd, +BS, dressing in place, clean and dry  pelvic: no vaginal bleeding   ext: venodynes in place; no calf pain    A/P: POD #0 s/p scheduled TLH, BS  cont close monitor   cont post op care  lovenox/venodyne boots for DVT ppx  pain management  cbc/bmp in am   d/w Dr. Mcleod

## 2021-08-19 ENCOUNTER — TRANSCRIPTION ENCOUNTER (OUTPATIENT)
Age: 41
End: 2021-08-19

## 2021-08-19 VITALS
OXYGEN SATURATION: 100 % | SYSTOLIC BLOOD PRESSURE: 112 MMHG | RESPIRATION RATE: 18 BRPM | DIASTOLIC BLOOD PRESSURE: 66 MMHG

## 2021-08-19 LAB
ANION GAP SERPL CALC-SCNC: 8 MMOL/L — SIGNIFICANT CHANGE UP (ref 5–17)
BUN SERPL-MCNC: 6 MG/DL — LOW (ref 7–18)
CALCIUM SERPL-MCNC: 9.1 MG/DL — SIGNIFICANT CHANGE UP (ref 8.4–10.5)
CHLORIDE SERPL-SCNC: 107 MMOL/L — SIGNIFICANT CHANGE UP (ref 96–108)
CO2 SERPL-SCNC: 25 MMOL/L — SIGNIFICANT CHANGE UP (ref 22–31)
COVID-19 SPIKE DOMAIN AB INTERP: NEGATIVE — SIGNIFICANT CHANGE UP
COVID-19 SPIKE DOMAIN ANTIBODY RESULT: 0.4 U/ML — SIGNIFICANT CHANGE UP
CREAT SERPL-MCNC: 0.89 MG/DL — SIGNIFICANT CHANGE UP (ref 0.5–1.3)
GLUCOSE SERPL-MCNC: 107 MG/DL — HIGH (ref 70–99)
HCT VFR BLD CALC: 32.6 % — LOW (ref 34.5–45)
HGB BLD-MCNC: 10.3 G/DL — LOW (ref 11.5–15.5)
MCHC RBC-ENTMCNC: 25.2 PG — LOW (ref 27–34)
MCHC RBC-ENTMCNC: 31.6 GM/DL — LOW (ref 32–36)
MCV RBC AUTO: 79.7 FL — LOW (ref 80–100)
NRBC # BLD: 0 /100 WBCS — SIGNIFICANT CHANGE UP (ref 0–0)
PLATELET # BLD AUTO: 314 K/UL — SIGNIFICANT CHANGE UP (ref 150–400)
POTASSIUM SERPL-MCNC: 3.4 MMOL/L — LOW (ref 3.5–5.3)
POTASSIUM SERPL-SCNC: 3.4 MMOL/L — LOW (ref 3.5–5.3)
RBC # BLD: 4.09 M/UL — SIGNIFICANT CHANGE UP (ref 3.8–5.2)
RBC # FLD: 14.8 % — HIGH (ref 10.3–14.5)
SARS-COV-2 IGG+IGM SERPL QL IA: 0.4 U/ML — SIGNIFICANT CHANGE UP
SARS-COV-2 IGG+IGM SERPL QL IA: NEGATIVE — SIGNIFICANT CHANGE UP
SODIUM SERPL-SCNC: 140 MMOL/L — SIGNIFICANT CHANGE UP (ref 135–145)
WBC # BLD: 16.29 K/UL — HIGH (ref 3.8–10.5)
WBC # FLD AUTO: 16.29 K/UL — HIGH (ref 3.8–10.5)

## 2021-08-19 PROCEDURE — 82962 GLUCOSE BLOOD TEST: CPT

## 2021-08-19 PROCEDURE — 81025 URINE PREGNANCY TEST: CPT

## 2021-08-19 PROCEDURE — 80048 BASIC METABOLIC PNL TOTAL CA: CPT

## 2021-08-19 PROCEDURE — 36415 COLL VENOUS BLD VENIPUNCTURE: CPT

## 2021-08-19 PROCEDURE — 88307 TISSUE EXAM BY PATHOLOGIST: CPT

## 2021-08-19 PROCEDURE — 86769 SARS-COV-2 COVID-19 ANTIBODY: CPT

## 2021-08-19 PROCEDURE — C1889: CPT

## 2021-08-19 PROCEDURE — 86850 RBC ANTIBODY SCREEN: CPT

## 2021-08-19 PROCEDURE — 85027 COMPLETE CBC AUTOMATED: CPT

## 2021-08-19 PROCEDURE — 81001 URINALYSIS AUTO W/SCOPE: CPT

## 2021-08-19 PROCEDURE — 86900 BLOOD TYPING SEROLOGIC ABO: CPT

## 2021-08-19 PROCEDURE — 86901 BLOOD TYPING SEROLOGIC RH(D): CPT

## 2021-08-19 PROCEDURE — 86923 COMPATIBILITY TEST ELECTRIC: CPT

## 2021-08-19 RX ORDER — SIMETHICONE 80 MG/1
1 TABLET, CHEWABLE ORAL
Qty: 30 | Refills: 0
Start: 2021-08-19

## 2021-08-19 RX ORDER — IBUPROFEN 200 MG
1 TABLET ORAL
Qty: 30 | Refills: 0
Start: 2021-08-19

## 2021-08-19 RX ORDER — IBUPROFEN 200 MG
600 TABLET ORAL EVERY 6 HOURS
Refills: 0 | Status: DISCONTINUED | OUTPATIENT
Start: 2021-08-19 | End: 2021-08-19

## 2021-08-19 RX ORDER — ACETAMINOPHEN 500 MG
2 TABLET ORAL
Qty: 0 | Refills: 0 | DISCHARGE
Start: 2021-08-19

## 2021-08-19 RX ORDER — OXYCODONE HYDROCHLORIDE 5 MG/1
5 TABLET ORAL EVERY 6 HOURS
Refills: 0 | Status: DISCONTINUED | OUTPATIENT
Start: 2021-08-19 | End: 2021-08-19

## 2021-08-19 RX ORDER — DOCUSATE SODIUM 100 MG
1 CAPSULE ORAL
Qty: 30 | Refills: 0
Start: 2021-08-19

## 2021-08-19 RX ORDER — ACETAMINOPHEN 500 MG
650 TABLET ORAL EVERY 6 HOURS
Refills: 0 | Status: DISCONTINUED | OUTPATIENT
Start: 2021-08-19 | End: 2021-08-19

## 2021-08-19 RX ADMIN — Medication 30 MILLIGRAM(S): at 06:10

## 2021-08-19 RX ADMIN — Medication 50 MICROGRAM(S): at 05:41

## 2021-08-19 RX ADMIN — HEPARIN SODIUM 5000 UNIT(S): 5000 INJECTION INTRAVENOUS; SUBCUTANEOUS at 11:36

## 2021-08-19 RX ADMIN — MORPHINE SULFATE 4 MILLIGRAM(S): 50 CAPSULE, EXTENDED RELEASE ORAL at 04:00

## 2021-08-19 RX ADMIN — MORPHINE SULFATE 4 MILLIGRAM(S): 50 CAPSULE, EXTENDED RELEASE ORAL at 10:32

## 2021-08-19 RX ADMIN — SIMETHICONE 80 MILLIGRAM(S): 80 TABLET, CHEWABLE ORAL at 03:41

## 2021-08-19 RX ADMIN — Medication 30 MILLIGRAM(S): at 05:41

## 2021-08-19 RX ADMIN — BENZOCAINE AND MENTHOL 1 LOZENGE: 5; 1 LIQUID ORAL at 12:47

## 2021-08-19 RX ADMIN — Medication 600 MILLIGRAM(S): at 17:00

## 2021-08-19 RX ADMIN — FAMOTIDINE 20 MILLIGRAM(S): 10 INJECTION INTRAVENOUS at 05:45

## 2021-08-19 RX ADMIN — Medication 200 MILLIGRAM(S): at 11:33

## 2021-08-19 RX ADMIN — MORPHINE SULFATE 4 MILLIGRAM(S): 50 CAPSULE, EXTENDED RELEASE ORAL at 03:41

## 2021-08-19 RX ADMIN — BENZOCAINE AND MENTHOL 1 LOZENGE: 5; 1 LIQUID ORAL at 05:43

## 2021-08-19 RX ADMIN — ZOLPIDEM TARTRATE 5 MILLIGRAM(S): 10 TABLET ORAL at 00:08

## 2021-08-19 NOTE — DISCHARGE NOTE PROVIDER - CARE PROVIDER_API CALL
Josh Mcleod)  Obstetrics and Gynecology  [address] [place] [address]  Phone: [phone]  Fax: [phone]  Follow Up Time:

## 2021-08-19 NOTE — DISCHARGE NOTE PROVIDER - NSDCMRMEDTOKEN_GEN_ALL_CORE_FT
acetaminophen 325 mg oral tablet: 2 tab(s) orally every 6 hours, As needed, Temp greater or equal to 38C (100.4F), Mild Pain (1 - 3)  Colace 100 mg oral capsule: 1 cap(s) orally 2 times a day   ibuprofen 600 mg oral tablet: 1 tab(s) orally every 6 hours, As needed, Moderate Pain (4 - 6)  simethicone 80 mg oral tablet, chewable: 1 tab(s) orally every 6 hours, As needed, Gas

## 2021-08-19 NOTE — PROGRESS NOTE ADULT - ATTENDING COMMENTS
S:  Patient seen earlier this morning on rounds, encountered sitting in bed eating breakfast.  She reports adequate pain control thus far with morphine, tylenol.  No nausea, vomiting.  She has ambulated to bathroom without dizziness and is voiding without difficulty.  She is concerned with constipation.      O: Vitals, labs, reviewed.  Pre=op hemoglobin (8/2) was 11.5  Exam - agree with findings as documented by TAMIKO Roth.  Abdomen is benign, no vaginal bleeding.      Assessment: Hemodynamically stable following TLH BS  Plan : Encourage ambulation, advance diet as tolerated, focus on non-narcotic pain relief.  Consider discharge home later today or tomorrow as patient tolerates.

## 2021-08-19 NOTE — DISCHARGE NOTE PROVIDER - NSDCCPTREATMENT_GEN_ALL_CORE_FT
PRINCIPAL PROCEDURE  Procedure: Laparoscopic hysterectomy, vaginal, uterus greater than 250 g  Findings and Treatment:

## 2021-08-19 NOTE — DISCHARGE NOTE PROVIDER - NSDCCPCAREPLAN_GEN_ALL_CORE_FT
PRINCIPAL DISCHARGE DIAGNOSIS  Diagnosis: S/P laparoscopic hysterectomy  Assessment and Plan of Treatment:

## 2021-08-19 NOTE — DISCHARGE NOTE NURSING/CASE MANAGEMENT/SOCIAL WORK - PATIENT PORTAL LINK FT
You can access the FollowMyHealth Patient Portal offered by [facility] by registering at the following website: [email or web address]. By joining [employer]’s FollowMyHealth portal, you will also be able to view your health information using other applications (apps) compatible with our system.

## 2021-08-19 NOTE — PROGRESS NOTE ADULT - SUBJECTIVE AND OBJECTIVE BOX
Patient seen resting comfortably.  Reports abdominal pain at incision sites.  Denies HA, CP, SOB, dizziness, palpitations, worsening abdominal pain, worsening vaginal bleeding or any other concerns.  + Ambulation, + void without difficulty, + flatus and tolerating regular diet.     Vital Signs Last 24 Hrs  T(C): 36.9 (19 Aug 2021 05:56), Max: 37.4 (18 Aug 2021 21:23)  T(F): 98.4 (19 Aug 2021 05:56), Max: 99.4 (18 Aug 2021 21:23)  HR: 94 (19 Aug 2021 05:56) (82 - 98)  BP: 101/52 (19 Aug 2021 05:56) (100/61 - 121/81)  BP(mean): 71 (18 Aug 2021 11:22) (71 - 71)  RR: 16 (19 Aug 2021 05:56) (16 - 18)  SpO2: 100% (19 Aug 2021 05:56) (95% - 100%)    Gen: A&O x3, NAD  Chest: CTABL  Cardiac: S1+S2+ RRR  Abdomen: Soft, nontender, +BS, nondistended, dressings clean/dry/intact  Gyn: No active bleeding  Extremities: Nontender, no worsening edema, DTRS 2+, venodynes intact bilaterally                          10.3   16.29 )-----------( 314      ( 19 Aug 2021 08:22 )             32.6    08-19    140  |  107  |  6<L>  ----------------------------<  107<H>  3.4<L>   |  25  |  0.89    Ca    9.1      19 Aug 2021 08:22        A/P: 41 year old female s/p laparoscopic hysterectomy and bilateral salpingectomy.  POD#1.     - Pain management  - Advance as per protocol  - OOB and ambulate  - Discharge planning    Discussed with Dr Mcleod

## 2021-08-19 NOTE — DISCHARGE NOTE NURSING/CASE MANAGEMENT/SOCIAL WORK - NSDCPEFALRISK_GEN_ALL_CORE
For information on Fall & injury Prevention, visit [email or web address]

## 2021-08-23 LAB — SURGICAL PATHOLOGY STUDY: SIGNIFICANT CHANGE UP

## 2021-08-24 RX ORDER — HYDROXYCHLOROQUINE SULFATE 200 MG
1 TABLET ORAL
Qty: 0 | Refills: 0 | DISCHARGE

## 2021-08-24 RX ORDER — SENNA PLUS 8.6 MG/1
1 TABLET ORAL
Qty: 0 | Refills: 0 | DISCHARGE

## 2021-08-24 RX ORDER — FAMOTIDINE 10 MG/ML
1 INJECTION INTRAVENOUS
Qty: 0 | Refills: 0 | DISCHARGE

## 2021-08-24 RX ORDER — LEVOTHYROXINE SODIUM 125 MCG
1 TABLET ORAL
Qty: 0 | Refills: 0 | DISCHARGE

## 2022-01-05 NOTE — PATIENT PROFILE ADULT. - AS SC BRADEN SENSORY
Discussed blood pressure and heart rate a little elevated with Dr Edwards, tachy 115-120 and pressures 150s/90s, patient agitated and vitals stabilize when he calms down, he will feel more comfortable going back to his room, okay to transfer   R knee replacement  L shoulder replacement  stents x2 (4) no impairment

## 2022-03-23 NOTE — H&P ADULT - NSHPSOCIALHISTORY_GEN_ALL_CORE
Patient to intake, search completed  at 1545. Two staff members present at time of search Cristina and Jakob present. Belongings placed in cabinet. Patient encouraged to wear at mask at all times while in intake. Safety precautions initiated.    Never smoker  No alcohol use  Lives at home with  and kids

## 2022-08-02 NOTE — H&P PST ADULT - NS PRO ABUSE SCREEN AFRAID ANYONE YN
Patient presents for visit accompanied by parent on phone and GM     CC: cough, sinus concerns    HPI:Patient has had cold or sinus symptoms and fever.  Reports congestion nose and cough  thats not getting better.  Has cough: not often, wet, off and on, sounds productive and gags, not getting better, x days    Reports  Fever since Thursday up to 104 last pm.    Denies ear pain, diarrhea     G tube out and no periactin!!!    ALLERGY:reviewed  MEDICATIONS: reviewed  IMMUNIZATIONS:reviewed    PMHx reviewed  Family not sick right now.  Social lives with family.      ROS:   CONSTITUTIONAL:alert, interactive   EYES:no eye discharge   ENT:see HPI   RESP:nl breathing, no wheezing or shortness of breath   GI:no vomiting, diarrhea    SKIN:no rash    PHYS. EXAM:vital signs have been reviewed   GEN:well nourished, well developed. Pain 0/10   SKIN:normal skin turgor, no lesions    EYES:PERRLA, nl conjuctiva   EARS:nl pinnae, TM's intact, red dull no landmarks bulge and pus filled on the right   NASAL:mucosa pink; nasal congestion and discharge present, oropharynx-mucus membranes moist, no pharyngeal erythema   NECK:supple, no masses   RESP:nl resp. effort, clear to auscultation   HEART:RRR no murmur   ABD: positive BS, soft NT/ND   MS:nl tone and motor movement of extremities   LYMPH:no cervical nodes   PSYCH:in no acute distress, appropriate and interactive    IMP: bilateral otitis media  acute sinusitis   cough    PLAN:Medications:see orders amoxicillin 250 mg/5ml 2 tsp or 10 ml po bid x 10 days  cool mist prn  education saline suctioning prn  Education why antibiotics this time and not for every illness  Education, diagnoses, and treatment. Supportive care eduction  Call with concerns. Return if symptoms persist, worsen, or if new signs and symptoms develop. Follow up at well check and prn.  Recheck ears at well check.    
no

## 2022-09-11 NOTE — ED PROVIDER NOTE - NO SIGNIFICANT PAST SURGICAL HISTORY
Briefly 54 yo AAM with ALS. At OSH with prolonged course complicated by bacteremia, PNA, upper extremity DVTs, facial swelling and trismus. Received Abx, PICC was changed there prior to transfer, and received BOTOX injections to help with his Trismus. Wheelchair bound since 2020     -ENT consult for facial mass on CT & trismus. Low suspicion for abscess. Recommended that anesthesia be consulted for V3 nerve block. Spoke to pain anesthesia and was advised to repeat botox injection at a higher dosage than previously injected (50cc). Reached out to Dr. Terry and he was able to perform botox injection with no complications (9/9).  -ID following: Continue vancomycin with end date of 9/13.   -Echo unremarkable  -tube feeds  -9/7: overnight patient's blood pressure was low so started levo  -9/8: Increased midodrine from 5mg q8h to 15mg q8h for better blood pressure control. Dc levo  -9/9: Increased midodrine to 20mg nightly. He was restarted on levo overnight due to low blood pressures and MAP. Started pseudoephedrine.   9/10: Minimal asymptomatic lower pressures in sleep, remove A-line as positional and causing pain.  Now that procedure complete, plan for return closer to home ([place])  9/11: Blood pressure more consistently stable. Atropine ordered for increased secretions. Will continue discharge planning.    <<----- Click to add NO significant Past Surgical History

## 2022-09-20 NOTE — ED PROVIDER NOTE - NSTOBACCONEVERSMOKERY/N_GEN_A
Reviewed with mother message from PCP. Mom states that she has some medication left over will continue to use and call office back if appt needed. No

## 2023-02-16 NOTE — ED PROVIDER NOTE - RECTAL
Addended by: CASTILLO VIEIRA on: 2/16/2023 10:27 AM     Modules accepted: Orders    
+ severe ttp superior to rectum WITHOUT erythema/rash/warmth/fluctuance noted, no hemorrhoids/fissures noted (rectal exam performed with Dr. Oliver as chaperone)

## 2024-02-26 ENCOUNTER — EMERGENCY (EMERGENCY)
Facility: HOSPITAL | Age: 44
LOS: 1 days | Discharge: HOME | End: 2024-02-26
Attending: EMERGENCY MEDICINE | Admitting: EMERGENCY MEDICINE
Payer: MEDICAID

## 2024-02-26 VITALS
SYSTOLIC BLOOD PRESSURE: 162 MMHG | RESPIRATION RATE: 17 BRPM | HEART RATE: 93 BPM | TEMPERATURE: 98 F | DIASTOLIC BLOOD PRESSURE: 99 MMHG | OXYGEN SATURATION: 98 %

## 2024-02-26 DIAGNOSIS — Z78.9 OTHER SPECIFIED HEALTH STATUS: Chronic | ICD-10-CM

## 2024-02-26 LAB
ALBUMIN SERPL ELPH-MCNC: 4.3 G/DL — SIGNIFICANT CHANGE UP (ref 3.3–5)
ALP SERPL-CCNC: 105 U/L — SIGNIFICANT CHANGE UP (ref 40–120)
ALT FLD-CCNC: 20 U/L — SIGNIFICANT CHANGE UP (ref 4–33)
ANION GAP SERPL CALC-SCNC: 10 MMOL/L — SIGNIFICANT CHANGE UP (ref 7–14)
APPEARANCE UR: CLEAR — SIGNIFICANT CHANGE UP
APTT BLD: 29.9 SEC — SIGNIFICANT CHANGE UP (ref 24.5–35.6)
AST SERPL-CCNC: 20 U/L — SIGNIFICANT CHANGE UP (ref 4–32)
BASOPHILS # BLD AUTO: 0.02 K/UL — SIGNIFICANT CHANGE UP (ref 0–0.2)
BASOPHILS NFR BLD AUTO: 0.2 % — SIGNIFICANT CHANGE UP (ref 0–2)
BILIRUB SERPL-MCNC: <0.2 MG/DL — SIGNIFICANT CHANGE UP (ref 0.2–1.2)
BILIRUB UR-MCNC: NEGATIVE — SIGNIFICANT CHANGE UP
BUN SERPL-MCNC: 11 MG/DL — SIGNIFICANT CHANGE UP (ref 7–23)
CALCIUM SERPL-MCNC: 9.3 MG/DL — SIGNIFICANT CHANGE UP (ref 8.4–10.5)
CHLORIDE SERPL-SCNC: 106 MMOL/L — SIGNIFICANT CHANGE UP (ref 98–107)
CO2 SERPL-SCNC: 24 MMOL/L — SIGNIFICANT CHANGE UP (ref 22–31)
COLOR SPEC: YELLOW — SIGNIFICANT CHANGE UP
CREAT SERPL-MCNC: 0.58 MG/DL — SIGNIFICANT CHANGE UP (ref 0.5–1.3)
DIFF PNL FLD: NEGATIVE — SIGNIFICANT CHANGE UP
EGFR: 113 ML/MIN/1.73M2 — SIGNIFICANT CHANGE UP
EOSINOPHIL # BLD AUTO: 0.06 K/UL — SIGNIFICANT CHANGE UP (ref 0–0.5)
EOSINOPHIL NFR BLD AUTO: 0.7 % — SIGNIFICANT CHANGE UP (ref 0–6)
GLUCOSE SERPL-MCNC: 208 MG/DL — HIGH (ref 70–99)
GLUCOSE UR QL: NEGATIVE MG/DL — SIGNIFICANT CHANGE UP
HCG SERPL-ACNC: 2.5 MIU/ML — SIGNIFICANT CHANGE UP
HCT VFR BLD CALC: 36.2 % — SIGNIFICANT CHANGE UP (ref 34.5–45)
HGB BLD-MCNC: 12.3 G/DL — SIGNIFICANT CHANGE UP (ref 11.5–15.5)
IANC: 6.78 K/UL — SIGNIFICANT CHANGE UP (ref 1.8–7.4)
IMM GRANULOCYTES NFR BLD AUTO: 0.6 % — SIGNIFICANT CHANGE UP (ref 0–0.9)
INR BLD: 0.94 RATIO — SIGNIFICANT CHANGE UP (ref 0.85–1.18)
KETONES UR-MCNC: NEGATIVE MG/DL — SIGNIFICANT CHANGE UP
LEUKOCYTE ESTERASE UR-ACNC: NEGATIVE — SIGNIFICANT CHANGE UP
LYMPHOCYTES # BLD AUTO: 1.48 K/UL — SIGNIFICANT CHANGE UP (ref 1–3.3)
LYMPHOCYTES # BLD AUTO: 17.4 % — SIGNIFICANT CHANGE UP (ref 13–44)
MCHC RBC-ENTMCNC: 28.1 PG — SIGNIFICANT CHANGE UP (ref 27–34)
MCHC RBC-ENTMCNC: 34 GM/DL — SIGNIFICANT CHANGE UP (ref 32–36)
MCV RBC AUTO: 82.6 FL — SIGNIFICANT CHANGE UP (ref 80–100)
MONOCYTES # BLD AUTO: 0.11 K/UL — SIGNIFICANT CHANGE UP (ref 0–0.9)
MONOCYTES NFR BLD AUTO: 1.3 % — LOW (ref 2–14)
NEUTROPHILS # BLD AUTO: 6.78 K/UL — SIGNIFICANT CHANGE UP (ref 1.8–7.4)
NEUTROPHILS NFR BLD AUTO: 79.8 % — HIGH (ref 43–77)
NITRITE UR-MCNC: NEGATIVE — SIGNIFICANT CHANGE UP
NRBC # BLD: 0 /100 WBCS — SIGNIFICANT CHANGE UP (ref 0–0)
NRBC # FLD: 0 K/UL — SIGNIFICANT CHANGE UP (ref 0–0)
NT-PROBNP SERPL-SCNC: <36 PG/ML — SIGNIFICANT CHANGE UP
PH UR: 8 — SIGNIFICANT CHANGE UP (ref 5–8)
PLATELET # BLD AUTO: 329 K/UL — SIGNIFICANT CHANGE UP (ref 150–400)
POTASSIUM SERPL-MCNC: 4.1 MMOL/L — SIGNIFICANT CHANGE UP (ref 3.5–5.3)
POTASSIUM SERPL-SCNC: 4.1 MMOL/L — SIGNIFICANT CHANGE UP (ref 3.5–5.3)
PROT SERPL-MCNC: 7.6 G/DL — SIGNIFICANT CHANGE UP (ref 6–8.3)
PROT UR-MCNC: NEGATIVE MG/DL — SIGNIFICANT CHANGE UP
PROTHROM AB SERPL-ACNC: 10.6 SEC — SIGNIFICANT CHANGE UP (ref 9.5–13)
RBC # BLD: 4.38 M/UL — SIGNIFICANT CHANGE UP (ref 3.8–5.2)
RBC # FLD: 12.4 % — SIGNIFICANT CHANGE UP (ref 10.3–14.5)
SODIUM SERPL-SCNC: 140 MMOL/L — SIGNIFICANT CHANGE UP (ref 135–145)
SP GR SPEC: 1.02 — SIGNIFICANT CHANGE UP (ref 1–1.03)
TROPONIN T, HIGH SENSITIVITY RESULT: <6 NG/L — SIGNIFICANT CHANGE UP
UROBILINOGEN FLD QL: 0.2 MG/DL — SIGNIFICANT CHANGE UP (ref 0.2–1)
WBC # BLD: 8.5 K/UL — SIGNIFICANT CHANGE UP (ref 3.8–10.5)
WBC # FLD AUTO: 8.5 K/UL — SIGNIFICANT CHANGE UP (ref 3.8–10.5)

## 2024-02-26 PROCEDURE — 93010 ELECTROCARDIOGRAM REPORT: CPT

## 2024-02-26 PROCEDURE — 99285 EMERGENCY DEPT VISIT HI MDM: CPT

## 2024-02-26 PROCEDURE — 93970 EXTREMITY STUDY: CPT | Mod: 26

## 2024-02-26 RX ORDER — ACETAMINOPHEN 500 MG
1000 TABLET ORAL ONCE
Refills: 0 | Status: COMPLETED | OUTPATIENT
Start: 2024-02-26 | End: 2024-02-26

## 2024-02-26 RX ADMIN — Medication 400 MILLIGRAM(S): at 22:55

## 2024-02-26 NOTE — ED PROVIDER NOTE - NSICDXFAMILYHX_GEN_ALL_CORE_FT
FAMILY HISTORY:  No pertinent family history in first degree relatives    Father  Still living? Yes, Estimated age: 69  Family history of diabetes mellitus (DM), Age at diagnosis: Age Unknown  FH: HTN (hypertension), Age at diagnosis: Age Unknown    Mother  Still living? Yes, Estimated age: 61  Family history of diabetes mellitus (DM), Age at diagnosis: Age Unknown

## 2024-02-26 NOTE — ED PROVIDER NOTE - CLINICAL SUMMARY MEDICAL DECISION MAKING FREE TEXT BOX
Jayson GIBBS: 44-year-old female with a history of lupus on hydroxychloroquine, hypertension, hypothyroidism here for evaluation of 2 weeks of intermittent left-sided chest pain that radiates to her neck and down her arm.  Patient states pain comes and goes.  She states pain does come with exertion.  Pain is also elicited with movement of her left arm.  She states she saw her PCP 2 weeks ago and was referred to cardiology.  She was advised to come to the emergency department if her pain becomes severe.  Patient is very anxious because her younger sister  at the age of 38.  She states her sister had pneumonia, a blood clot in her heart stop.  She states that her brother also recently had stents placed.  Patient did travel to [place] in late 2023.  She denies any fevers, chills, nausea, vomiting.  Patient states she has an appointment with cardiology around . On exam, patient has pain with movement of her left arm and palpation of her left chest. She reports pain in her entire left leg. No edema or calf tenderness. Differential includes ACS, PE, MSK pain. Patient denies recent cardiac work up. EKG reviewed and is NSR without st elevations or depressions. Plan for labs, CTA Chest, b/l LE doppler. If no acute findings, plan for CDU for stress/ echo given strong family history of cardiac disease and c/o pain radiating to neck and left arm. Jayson GIBBS: 44-year-old female with a history of lupus on hydroxychloroquine, hypertension, hypothyroidism here for evaluation of 2 weeks of intermittent left-sided chest pain that radiates to her neck and down her arm.  Patient states pain comes and goes.  She states pain does come with exertion.  Pain is also elicited with movement of her left arm.  She states she saw her PCP 2 weeks ago and was referred to cardiology.  She was advised to come to the emergency department if her pain becomes severe.  Patient is very anxious because her younger sister  at the age of 38.  She states her sister had pneumonia, a blood clot in her heart stop.  She states that her brother also recently had stents placed.  Patient did travel to [place] in late 2023.  She denies any fevers, chills, nausea, vomiting.  Patient states she has an appointment with cardiology around . On exam, patient has pain with movement of her left arm and palpation of her left chest. She reports pain in her entire left leg. No edema or calf tenderness. Differential includes ACS, PE, MSK pain. Patient denies recent cardiac work up. EKG reviewed and is NSR without st elevations or depressions. Plan for labs, CTA Chest, b/l LE doppler. If no acute findings, consider CDU for stress/ echo given strong family history of cardiac disease and c/o pain radiating to neck and left arm.

## 2024-02-26 NOTE — ED PROVIDER NOTE - OBJECTIVE STATEMENT
Jayson GIBBS: Patient is a 44-year-old female with a history of lupus on hydroxychloroquine, hypertension, hypothyroidism here for evaluation of 2 weeks of intermittent left-sided chest pain that radiates to her neck and down her arm.  Patient states pain comes and goes.  She states pain does come with exertion.  Pain is also elicited with movement of her left arm.  She states she saw her PCP 2 weeks ago and was referred to cardiology.  She was advised to come to the emergency department if her pain becomes severe.  Patient is very anxious because her younger sister  at the age of 38.  She states her sister had pneumonia, a blood clot in her heart stop.  She states that her brother also recently had stents placed.  Patient did travel to [place] in late 2023.  She denies any fevers, chills, nausea, vomiting.  Patient states she has an appointment with cardiology around .    PCP: Marcus mckeon, Dr Checo Magaña  Rheumatology: Sherry Moore Jayson GIBBS: Patient is a 44-year-old female with a history of lupus on hydroxychloroquine, hypertension, hypothyroidism here for evaluation of 2 weeks of intermittent left-sided chest pain that radiates to her neck and down her arm.  Patient states pain comes and goes.  She states pain does come with exertion.  Pain is also elicited with movement of her left arm.  She states she saw her PCP 2 weeks ago and was referred to cardiology.  She was advised to come to the emergency department if her pain becomes severe.  Patient is very anxious because her younger sister  at the age of 38.  She states her sister had pneumonia, a blood clot in her lungs and she  of cardiac arrest.  She states that her brother also recently had stents placed.  Patient did travel to [place] in late 2023.  She denies any fevers, chills, nausea, vomiting.  Patient states she has an appointment with cardiology around .    PCP: Dr Checo Moore  Rheumatology: Sherry Moore

## 2024-02-26 NOTE — ED PROVIDER NOTE - PATIENT PORTAL LINK FT
You can access the FollowMyHealth Patient Portal offered by [facility] by registering at the following website: [email or web address]. By joining [employer]’s FollowMyHealth portal, you will also be able to view your health information using other applications (apps) compatible with our system.

## 2024-02-26 NOTE — ED PROVIDER NOTE - PROGRESS NOTE DETAILS
COURT: Patient was signed out to me pending CT imaging and ultrasounds.  Patient's CT shows no clinical significant PE per radiology read and there is no other acute findings.  Ultrasound of her bilateral lower extremity shows no DVTs in either lower extremities.  Chest x-ray was read as clear lungs by radiology.  Urine was negative and her labs are negative including troponin and proBNP.  Patient is feeling improvement of her symptoms will discharge at this time to follow-up outpatient with PMD and cardiology.  Return precautions explained understood.

## 2024-02-26 NOTE — ED PROVIDER NOTE - NSFOLLOWUPINSTRUCTIONS_ED_ALL_ED_FT
You were seen in the emergency department for chest pain tonight.  We obtained labs and imaging and did not find any cause of your pain.      Please take these results to your primary care doctor for further follow-up to see if they would recommend you seeing anybody else including a cardiologist.    If your symptoms worsen or return please come back to the emergency department for further treatment evaluation.

## 2024-02-26 NOTE — ED ADULT TRIAGE NOTE - CHIEF COMPLAINT QUOTE
pt aox4, ambulatory with steady gait comes in for 2 weeks of intermittent chest pain that radiates up the neck and left arm. Saw PMD and referred to see cardiologist but does not have appt until March. advised to come to ED if she has another episode of chest pain. Hx: Lupus, hypothyroid, HTN.

## 2024-02-26 NOTE — ED PROVIDER NOTE - NSICDXPASTMEDICALHX_GEN_ALL_CORE_FT
PAST MEDICAL HISTORY:  Abnormal uterine and vaginal bleeding, unspecified     Acquired hypothyroidism     Discoid lupus     HTN (hypertension)     Hypertension, unspecified type     Hypothyroidism     Hypothyroidism     Lupus     Lupus (systemic lupus erythematosus)

## 2024-02-26 NOTE — ED ADULT NURSE NOTE - OBJECTIVE STATEMENT
received to intake room 08 a&ox4 ambulatory past medical Hx of HTN, lupus, hypothyroidism, c/o intermittent left sided chest pain x 2 weeks radiating to the left arm and neck. appointment scheduled with cardiology 03/2024, but told to come to ED if it persists. capillary refill <2. respirations even and unlabored with no accessory muscle use. denies SOB, nausea, vomiting, dizziness, headache, fevers/chills, sick contacts. 20g placed to the right wrist. labs collected and sent. MD Tyler at bedside for eval. normal sinus on monitor. pending US and CT at this time.

## 2024-02-27 VITALS
OXYGEN SATURATION: 99 % | TEMPERATURE: 98 F | RESPIRATION RATE: 17 BRPM | SYSTOLIC BLOOD PRESSURE: 117 MMHG | HEART RATE: 74 BPM | DIASTOLIC BLOOD PRESSURE: 76 MMHG

## 2024-02-27 PROBLEM — N93.9 ABNORMAL UTERINE AND VAGINAL BLEEDING, UNSPECIFIED: Chronic | Status: ACTIVE | Noted: 2021-08-04

## 2024-02-27 PROBLEM — I10 ESSENTIAL (PRIMARY) HYPERTENSION: Chronic | Status: ACTIVE | Noted: 2019-07-17

## 2024-02-27 PROBLEM — L93.0 DISCOID LUPUS ERYTHEMATOSUS: Chronic | Status: ACTIVE | Noted: 2021-08-04

## 2024-02-27 PROBLEM — E03.9 HYPOTHYROIDISM, UNSPECIFIED: Chronic | Status: ACTIVE | Noted: 2021-08-04

## 2024-02-27 PROBLEM — I10 ESSENTIAL (PRIMARY) HYPERTENSION: Chronic | Status: ACTIVE | Noted: 2021-08-04

## 2024-02-27 PROBLEM — M32.9 SYSTEMIC LUPUS ERYTHEMATOSUS, UNSPECIFIED: Chronic | Status: ACTIVE | Noted: 2019-07-17

## 2024-02-27 PROBLEM — E03.9 HYPOTHYROIDISM, UNSPECIFIED: Chronic | Status: ACTIVE | Noted: 2019-07-17

## 2024-02-27 PROCEDURE — 71046 X-RAY EXAM CHEST 2 VIEWS: CPT | Mod: 26

## 2024-02-27 PROCEDURE — 71275 CT ANGIOGRAPHY CHEST: CPT | Mod: 26,MC

## 2024-07-10 NOTE — PATIENT PROFILE ADULT. - AS SC BRADEN MOISTURE
..  Time: 2114    Old EKG Present:    [] Yes   [] No    Type:    [x] Standard Left   [] Right Sided   [] Posterior Sided   [] Rhythm Strip    Shown to: Dr Torie Morton     (4) rarely moist

## 2024-09-03 NOTE — PROGRESS NOTE ADULT - PROBLEM SELECTOR PLAN 4
Please read the information given to you on menopause and [place].  If you desire a prescription to be called in, please let us know.  Please get your mammogram and bone density x-ray done as scheduled.  We will let you know that result as well as today's Pap.  Plan on returning to the office in 1 year or as needed.  Welcome to our practice and have a wonderful year!   - Hb ~10, with recent blood work from 2 weeks ago w Hb 12.2  - no rectal bleeding, no hematemesis; likely menstrual etiology  - likely not Fe deficiency Component of iron deficiency anemia given low ferritin as well as anemia of chronic disease given underlying rheumatologic condition  - Hb stable in 10s  - no overt bleeding

## 2024-12-19 NOTE — ED PROVIDER NOTE - NS ED MD DISPO ISOLATION TYPES
----- Message from Aubrey Bro sent at 12/18/2024 12:34 PM EST -----  Slightly elevated microalbumin/creatinine ratio.  Recommend continuing lisinopril-hydrochlorothiazide at current dosage to help protect kidney function.  
Gave Pt results and recommendations  No questions or concerns at this time          
None

## 2025-07-07 ENCOUNTER — EMERGENCY (EMERGENCY)
Facility: HOSPITAL | Age: 45
LOS: 1 days | End: 2025-07-07
Attending: STUDENT IN AN ORGANIZED HEALTH CARE EDUCATION/TRAINING PROGRAM
Payer: COMMERCIAL

## 2025-07-07 VITALS
RESPIRATION RATE: 16 BRPM | SYSTOLIC BLOOD PRESSURE: 136 MMHG | HEART RATE: 67 BPM | DIASTOLIC BLOOD PRESSURE: 67 MMHG | OXYGEN SATURATION: 98 % | TEMPERATURE: 98 F

## 2025-07-07 VITALS
SYSTOLIC BLOOD PRESSURE: 154 MMHG | OXYGEN SATURATION: 97 % | RESPIRATION RATE: 20 BRPM | HEIGHT: 59 IN | TEMPERATURE: 98 F | HEART RATE: 84 BPM | DIASTOLIC BLOOD PRESSURE: 94 MMHG | WEIGHT: 139.99 LBS

## 2025-07-07 DIAGNOSIS — Z78.9 OTHER SPECIFIED HEALTH STATUS: Chronic | ICD-10-CM

## 2025-07-07 LAB
ALBUMIN SERPL ELPH-MCNC: 4.1 G/DL — SIGNIFICANT CHANGE UP (ref 3.3–5)
ALP SERPL-CCNC: 112 U/L — SIGNIFICANT CHANGE UP (ref 40–120)
ALT FLD-CCNC: 23 U/L — SIGNIFICANT CHANGE UP (ref 10–45)
ANION GAP SERPL CALC-SCNC: 15 MMOL/L — SIGNIFICANT CHANGE UP (ref 5–17)
APPEARANCE UR: CLEAR — SIGNIFICANT CHANGE UP
APTT BLD: 30.3 SEC — SIGNIFICANT CHANGE UP (ref 26.1–36.8)
AST SERPL-CCNC: 20 U/L — SIGNIFICANT CHANGE UP (ref 10–40)
BACTERIA # UR AUTO: NEGATIVE /HPF — SIGNIFICANT CHANGE UP
BASOPHILS # BLD AUTO: 0.05 K/UL — SIGNIFICANT CHANGE UP (ref 0–0.2)
BASOPHILS NFR BLD AUTO: 0.5 % — SIGNIFICANT CHANGE UP (ref 0–2)
BILIRUB SERPL-MCNC: 0.3 MG/DL — SIGNIFICANT CHANGE UP (ref 0.2–1.2)
BILIRUB UR-MCNC: NEGATIVE — SIGNIFICANT CHANGE UP
BUN SERPL-MCNC: 10 MG/DL — SIGNIFICANT CHANGE UP (ref 7–23)
CALCIUM SERPL-MCNC: 9.6 MG/DL — SIGNIFICANT CHANGE UP (ref 8.4–10.5)
CAST: 0 /LPF — SIGNIFICANT CHANGE UP (ref 0–4)
CHLORIDE SERPL-SCNC: 102 MMOL/L — SIGNIFICANT CHANGE UP (ref 96–108)
CO2 SERPL-SCNC: 22 MMOL/L — SIGNIFICANT CHANGE UP (ref 22–31)
COLOR SPEC: YELLOW — SIGNIFICANT CHANGE UP
CREAT SERPL-MCNC: 0.66 MG/DL — SIGNIFICANT CHANGE UP (ref 0.5–1.3)
DIFF PNL FLD: NEGATIVE — SIGNIFICANT CHANGE UP
EGFR: 109 ML/MIN/1.73M2 — SIGNIFICANT CHANGE UP
EGFR: 109 ML/MIN/1.73M2 — SIGNIFICANT CHANGE UP
EOSINOPHIL # BLD AUTO: 1 K/UL — HIGH (ref 0–0.5)
EOSINOPHIL NFR BLD AUTO: 10.3 % — HIGH (ref 0–6)
GLUCOSE SERPL-MCNC: 98 MG/DL — SIGNIFICANT CHANGE UP (ref 70–99)
GLUCOSE UR QL: NEGATIVE MG/DL — SIGNIFICANT CHANGE UP
HCG SERPL-ACNC: 3.6 MIU/ML — SIGNIFICANT CHANGE UP
HCT VFR BLD CALC: 40.1 % — SIGNIFICANT CHANGE UP (ref 34.5–45)
HGB BLD-MCNC: 13 G/DL — SIGNIFICANT CHANGE UP (ref 11.5–15.5)
IMM GRANULOCYTES # BLD AUTO: 0.03 K/UL — SIGNIFICANT CHANGE UP (ref 0–0.07)
IMM GRANULOCYTES NFR BLD AUTO: 0.3 % — SIGNIFICANT CHANGE UP (ref 0–0.9)
INR BLD: 1.09 RATIO — SIGNIFICANT CHANGE UP (ref 0.85–1.16)
KETONES UR QL: NEGATIVE MG/DL — SIGNIFICANT CHANGE UP
LEUKOCYTE ESTERASE UR-ACNC: NEGATIVE — SIGNIFICANT CHANGE UP
LIDOCAIN IGE QN: 19 U/L — SIGNIFICANT CHANGE UP (ref 7–60)
LYMPHOCYTES # BLD AUTO: 2.92 K/UL — SIGNIFICANT CHANGE UP (ref 1–3.3)
LYMPHOCYTES NFR BLD AUTO: 29.9 % — SIGNIFICANT CHANGE UP (ref 13–44)
MAGNESIUM SERPL-MCNC: 2.2 MG/DL — SIGNIFICANT CHANGE UP (ref 1.6–2.6)
MCHC RBC-ENTMCNC: 29 PG — SIGNIFICANT CHANGE UP (ref 27–34)
MCHC RBC-ENTMCNC: 32.4 G/DL — SIGNIFICANT CHANGE UP (ref 32–36)
MCV RBC AUTO: 89.3 FL — SIGNIFICANT CHANGE UP (ref 80–100)
MONOCYTES # BLD AUTO: 0.64 K/UL — SIGNIFICANT CHANGE UP (ref 0–0.9)
MONOCYTES NFR BLD AUTO: 6.6 % — SIGNIFICANT CHANGE UP (ref 2–14)
NEUTROPHILS # BLD AUTO: 5.11 K/UL — SIGNIFICANT CHANGE UP (ref 1.8–7.4)
NEUTROPHILS NFR BLD AUTO: 52.4 % — SIGNIFICANT CHANGE UP (ref 43–77)
NITRITE UR-MCNC: NEGATIVE — SIGNIFICANT CHANGE UP
NRBC # BLD AUTO: 0 K/UL — SIGNIFICANT CHANGE UP (ref 0–0)
NRBC # FLD: 0 K/UL — SIGNIFICANT CHANGE UP (ref 0–0)
NRBC BLD AUTO-RTO: 0 /100 WBCS — SIGNIFICANT CHANGE UP (ref 0–0)
NT-PROBNP SERPL-SCNC: <36 PG/ML — SIGNIFICANT CHANGE UP (ref 0–300)
PH UR: 8.5 (ref 5–8)
PLATELET # BLD AUTO: 309 K/UL — SIGNIFICANT CHANGE UP (ref 150–400)
PMV BLD: 10.2 FL — SIGNIFICANT CHANGE UP (ref 7–13)
POTASSIUM SERPL-MCNC: 3.8 MMOL/L — SIGNIFICANT CHANGE UP (ref 3.5–5.3)
POTASSIUM SERPL-SCNC: 3.8 MMOL/L — SIGNIFICANT CHANGE UP (ref 3.5–5.3)
PROT SERPL-MCNC: 7.9 G/DL — SIGNIFICANT CHANGE UP (ref 6–8.3)
PROT UR-MCNC: NEGATIVE MG/DL — SIGNIFICANT CHANGE UP
PROTHROM AB SERPL-ACNC: 12.5 SEC — SIGNIFICANT CHANGE UP (ref 9.9–13.4)
RBC # BLD: 4.49 M/UL — SIGNIFICANT CHANGE UP (ref 3.8–5.2)
RBC # FLD: 11.9 % — SIGNIFICANT CHANGE UP (ref 10.3–14.5)
RBC CASTS # UR COMP ASSIST: 2 /HPF — SIGNIFICANT CHANGE UP (ref 0–4)
SODIUM SERPL-SCNC: 139 MMOL/L — SIGNIFICANT CHANGE UP (ref 135–145)
SP GR SPEC: 1.01 — SIGNIFICANT CHANGE UP (ref 1–1.03)
SQUAMOUS # UR AUTO: 1 /HPF — SIGNIFICANT CHANGE UP (ref 0–5)
TROPONIN T, HIGH SENSITIVITY RESULT: <6 NG/L — SIGNIFICANT CHANGE UP (ref 0–51)
UROBILINOGEN FLD QL: 0.2 MG/DL — SIGNIFICANT CHANGE UP (ref 0.2–1)
WBC # BLD: 9.75 K/UL — SIGNIFICANT CHANGE UP (ref 3.8–10.5)
WBC # FLD AUTO: 9.75 K/UL — SIGNIFICANT CHANGE UP (ref 3.8–10.5)
WBC UR QL: 0 /HPF — SIGNIFICANT CHANGE UP (ref 0–5)

## 2025-07-07 PROCEDURE — 84443 ASSAY THYROID STIM HORMONE: CPT

## 2025-07-07 PROCEDURE — 93010 ELECTROCARDIOGRAM REPORT: CPT

## 2025-07-07 PROCEDURE — 96374 THER/PROPH/DIAG INJ IV PUSH: CPT | Mod: XU

## 2025-07-07 PROCEDURE — 83735 ASSAY OF MAGNESIUM: CPT

## 2025-07-07 PROCEDURE — 71045 X-RAY EXAM CHEST 1 VIEW: CPT | Mod: 26

## 2025-07-07 PROCEDURE — 84702 CHORIONIC GONADOTROPIN TEST: CPT

## 2025-07-07 PROCEDURE — 85610 PROTHROMBIN TIME: CPT

## 2025-07-07 PROCEDURE — 85730 THROMBOPLASTIN TIME PARTIAL: CPT

## 2025-07-07 PROCEDURE — 80053 COMPREHEN METABOLIC PANEL: CPT

## 2025-07-07 PROCEDURE — 93005 ELECTROCARDIOGRAM TRACING: CPT

## 2025-07-07 PROCEDURE — 74177 CT ABD & PELVIS W/CONTRAST: CPT

## 2025-07-07 PROCEDURE — 71275 CT ANGIOGRAPHY CHEST: CPT

## 2025-07-07 PROCEDURE — 81001 URINALYSIS AUTO W/SCOPE: CPT

## 2025-07-07 PROCEDURE — 71045 X-RAY EXAM CHEST 1 VIEW: CPT

## 2025-07-07 PROCEDURE — 85025 COMPLETE CBC W/AUTO DIFF WBC: CPT

## 2025-07-07 PROCEDURE — 96375 TX/PRO/DX INJ NEW DRUG ADDON: CPT | Mod: XU

## 2025-07-07 PROCEDURE — 99285 EMERGENCY DEPT VISIT HI MDM: CPT | Mod: 25

## 2025-07-07 PROCEDURE — 83880 ASSAY OF NATRIURETIC PEPTIDE: CPT

## 2025-07-07 PROCEDURE — 87086 URINE CULTURE/COLONY COUNT: CPT

## 2025-07-07 PROCEDURE — 84484 ASSAY OF TROPONIN QUANT: CPT

## 2025-07-07 PROCEDURE — 99285 EMERGENCY DEPT VISIT HI MDM: CPT

## 2025-07-07 PROCEDURE — 71275 CT ANGIOGRAPHY CHEST: CPT | Mod: 26

## 2025-07-07 PROCEDURE — 74177 CT ABD & PELVIS W/CONTRAST: CPT | Mod: 26

## 2025-07-07 PROCEDURE — 83690 ASSAY OF LIPASE: CPT

## 2025-07-07 RX ORDER — ACETAMINOPHEN 500 MG/5ML
1000 LIQUID (ML) ORAL ONCE
Refills: 0 | Status: COMPLETED | OUTPATIENT
Start: 2025-07-07 | End: 2025-07-07

## 2025-07-07 RX ORDER — KETOROLAC TROMETHAMINE 30 MG/ML
15 INJECTION, SOLUTION INTRAMUSCULAR; INTRAVENOUS ONCE
Refills: 0 | Status: DISCONTINUED | OUTPATIENT
Start: 2025-07-07 | End: 2025-07-07

## 2025-07-07 RX ORDER — LIDOCAINE HYDROCHLORIDE 20 MG/ML
1 JELLY TOPICAL ONCE
Refills: 0 | Status: COMPLETED | OUTPATIENT
Start: 2025-07-07 | End: 2025-07-07

## 2025-07-07 RX ADMIN — LIDOCAINE HYDROCHLORIDE 1 PATCH: 20 JELLY TOPICAL at 17:20

## 2025-07-07 RX ADMIN — Medication 400 MILLIGRAM(S): at 17:20

## 2025-07-07 RX ADMIN — KETOROLAC TROMETHAMINE 15 MILLIGRAM(S): 30 INJECTION, SOLUTION INTRAMUSCULAR; INTRAVENOUS at 17:20

## 2025-07-07 NOTE — ED PROVIDER NOTE - PATIENT PORTAL LINK FT
You can access the FollowMyHealth Patient Portal offered by [facility] by registering at the following website: [email or web address]. By joining [employer]’s FollowMyHealth portal, you will also be able to view your health information using other applications (apps) compatible with our system.

## 2025-07-07 NOTE — ED PROVIDER NOTE - NSFOLLOWUPINSTRUCTIONS_ED_ALL_ED_FT
You will need further medical care and evaluation.     Please follow-up with your Rheumatologist and primary care doctor.    Please follow-up with Cardiology. Our discharge clinic will call in 1-2 business days to help set-up this appointment     Follow up with your medical doctor in 2-3 days or call our clinic at [phone] and state you were seen in the Emergency Department and would like to be seen in clinic. You may also call [phone] to speak with a representative to assist follow up care with medicine, surgery, or specialists.    Please return to the Emergency Department if you develop: fever, pain with urination, chest pain, shortness of breath, nausea, vomiting    Return for any persistent, worsening symptoms, or ANY concerns at all.

## 2025-07-07 NOTE — ED ADULT TRIAGE NOTE - CHIEF COMPLAINT QUOTE
Chest and back pain (chest pain not radiating to back), nausea, HA x 7/4. Has been on abx for kidney infection however no improvement.   family hx of heart issues, sister passed at 39

## 2025-07-07 NOTE — ED PROVIDER NOTE - PROGRESS NOTE DETAILS
(Kayce Hardy MD-PGY2): Spoke with pt after attF with a history of lupus, hypothyroidism, and HTN who presents to the ER for evaluation of chest pain and R-sided lower back/flank pain. Interviewed by resident in [facility]. Confirmed story per attg attestation. Pt having chest pain for weeks and previously had neg chest workup including CT coronary 2 years ago. Here cp workup okay and pt given option for CDU for further testing (supposed to have yearly evals with cards, but missed due to not having cardiologist anymore). Pt declined CDU in favor of expedited outpt cards and/or return to ER if further concerning symptoms. Regarding lower back pain on R, pt with recently treated UTI and still having non-reproducible back pain, so will add on CTAP. Pt understands plan without further questions or needs. (Kayce Hardy MD-PGY2): Pt signed out to night team pending CT reads for chest and abdomen. Would also require expedited cards follow up--plz give to patient once discharging.

## 2025-07-07 NOTE — ED PROVIDER NOTE - RAPID ASSESSMENT
45-year-old female with history of lupus, hypothyroidism, hypertension presents the emergency department for evaluation of chest pain as well as lower back pain.  Patient states that about 2 weeks ago had some dysuria, at that time was not evaluated and felt symptoms improved.  She reports that she began to have pain in her lower back last week which was making it difficult for her to sleep.  She was evaluated on at urgent care on 7/5 and diagnosed with "kidney infection" and started on Cipro which he has been taking.  She reports that she has had persistent back pain despite antibiotics.  She also notes that she has been having intermittent pain in the left upper chest since 7/4 as well.  Reports that pain is sharp, waxing and waning.  She does endorse some pain in the left arm as well.  She notes positive family history of CAD and blood clots.  She has had cardiac workups in the past but was lost to follow-up few years ago.  Denies fevers, shortness of breath, upper back pain, abdominal pain nausea or vomiting.    *Patient was rapidly assessed in triage by me and limited history was obtained. The patient will be seen, further examined and evaluated in the main ED where their care will be completed by the covering ED team. Receiving team will follow up on labs, medications, any clinical imaging, and perform reassessment and disposition of the patient as clinically indicated. All decisions regarding the progression of care will be made at their discretion. -Rebecca Mendez PA-C*

## 2025-07-07 NOTE — ED ADULT NURSE NOTE - OBJECTIVE STATEMENT
patient is a 46 y/o F who presents to the ED via triage c/o cp and lower back pain.  patient states that about 2 weeks ago had some dysuria, at that time was not evaluated and felt symptoms improved.  She reports that she began to have pain in her lower back last week which was making it difficult for her to sleep.  She was evaluated on at urgent care on 7/5 and diagnosed with "kidney infection" and started on Cipro which he has been taking.  She reports that she has had persistent back pain despite antibiotics.  She also notes that she has been having intermittent pain in the left upper chest since 7/4 as well.  Reports that pain is sharp, waxing and waning.  She does endorse some pain in the left arm as well. IV in place from qRN. NSR on CM. comfort and safety maintained

## 2025-07-07 NOTE — ED PROVIDER NOTE - ATTENDING CONTRIBUTION TO CARE
I have personally performed a face to face medical and diagnostic evaluation of the patient. I have discussed with and reviewed the Resident's and/or ACP's and/or Medical/PA/NP student's note and agree with the History, ROS, Physical Exam and MDM unless otherwise indicated. A brief summary of my personal evaluation and impression can be found below.     45-year-old female past medical history of lupus, hypothyroidism, hypertension presents emergency department for bilateral lower back pain (nonradiating) for the last few days, was diagnosed with UTI at urgent care few days ago started on Cipro (now on day 2 of Cipro) abdominal pain, reports nausea but no vomiting, patient tolerating p.o.  No abnormal bowel movements.  Patient also endorses a few weeks of intermittent left-sided nonradiating chest pain worse with pressure and when breathing, nonexertional.  Patient follows with cardiology (last visit 2 years ago with normal echo and CT coronary).  Patient was told to follow-up with cardiology every year due to significant family history.  no new calf pain or swelling.  No recent illness, fever or chills.  No cough or shortness of breath.      Physical exam shows abdomen soft nontender, no CVA tenderness.  No midline spinal tenderness.  Neuro intact, no focal deficits, distal pulses 2+ bilaterally.  Regular rate and rhythm, lungs clear to auscultation.  Patient well-appearing, nontoxic, hemodynamically stable.   Chest wall mildly tender to palpation at the left, no rash of exposed skin.    Given hx and physical, ddx includes but is not limited to   Pyelonephritis, kidney stone, UTI, MSK pain, lumbar muscle spasm, less likely ACS (however patient with risk factor of lupus and family history so further evaluate with EKG and troponin however pain is atypical, reproducible nonexertional).  Less likely PE but given pleuritic nature and history of lupus will further evaluate.  Plan for EKG, labs, x-ray, ct, UA, reassess.      EKG showing normal sinus rhythm, heart rate 84 [place] interval 118 QRS 78 QTc 441 T wave inversion noted in lead V3.

## 2025-07-08 LAB
CULTURE RESULTS: SIGNIFICANT CHANGE UP
SPECIMEN SOURCE: SIGNIFICANT CHANGE UP
TSH SERPL-MCNC: 2.85 UIU/ML — SIGNIFICANT CHANGE UP (ref 0.27–4.2)

## 2025-07-13 NOTE — PROGRESS NOTE ADULT - PROBLEM SELECTOR PLAN 5
- c/w home synthroid 50 mcg qd
None